# Patient Record
Sex: MALE | Race: WHITE | Employment: FULL TIME | URBAN - METROPOLITAN AREA
[De-identification: names, ages, dates, MRNs, and addresses within clinical notes are randomized per-mention and may not be internally consistent; named-entity substitution may affect disease eponyms.]

---

## 2017-01-10 DIAGNOSIS — N40.0 BPH (BENIGN PROSTATIC HYPERPLASIA): ICD-10-CM

## 2017-01-11 DIAGNOSIS — N52.01 ERECTILE DYSFUNCTION DUE TO ARTERIAL INSUFFICIENCY: ICD-10-CM

## 2017-01-11 DIAGNOSIS — N40.0 BPH (BENIGN PROSTATIC HYPERPLASIA): ICD-10-CM

## 2017-01-11 RX ORDER — TADALAFIL 5 MG/1
5 TABLET ORAL AS NEEDED
Qty: 30 TAB | Refills: 5 | Status: SHIPPED | OUTPATIENT
Start: 2017-01-11 | End: 2018-02-08 | Stop reason: SDUPTHER

## 2017-01-11 RX ORDER — TAMSULOSIN HYDROCHLORIDE 0.4 MG/1
0.4 CAPSULE ORAL DAILY
Qty: 30 CAP | Refills: 5 | Status: SHIPPED | OUTPATIENT
Start: 2017-01-11 | End: 2018-02-13 | Stop reason: SDUPTHER

## 2017-01-11 RX ORDER — TAMSULOSIN HYDROCHLORIDE 0.4 MG/1
CAPSULE ORAL
Qty: 30 CAP | Refills: 2 | Status: SHIPPED | OUTPATIENT
Start: 2017-01-11 | End: 2017-01-11 | Stop reason: SDUPTHER

## 2017-01-11 NOTE — TELEPHONE ENCOUNTER
From: Yifan Calero III  To: Thuy Anne DO  Sent: 1/11/2017 2:20 PM EST  Subject: Medication Renewal Request    Original authorizing provider: DO Yifan Carranza III would like a refill of the following medications:  tadalafil (CIALIS) 5 mg tablet [Venkatesh Khan DO]  tamsulosin (FLOMAX) 0.4 mg capsule Thuy Anne DO]    Preferred pharmacy: [place] [id number], [address]:

## 2017-01-17 ENCOUNTER — DOCUMENTATION ONLY (OUTPATIENT)
Dept: FAMILY MEDICINE CLINIC | Age: 62
End: 2017-01-17

## 2017-01-17 NOTE — PROGRESS NOTES
Pt is needing a pa for his rx cialis please. He brought in his new insurance card and it is in now. Any questions call him at [phone]. Thanks.

## 2017-01-18 NOTE — PROGRESS NOTES
[facility] form for Cialis completed and placed on Dr. Nikunj Vásquez desk for signature then fax.

## 2017-01-20 ENCOUNTER — TELEPHONE (OUTPATIENT)
Dept: FAMILY MEDICINE CLINIC | Age: 62
End: 2017-01-20

## 2017-02-08 ENCOUNTER — DOCUMENTATION ONLY (OUTPATIENT)
Dept: FAMILY MEDICINE CLINIC | Age: 62
End: 2017-02-08

## 2017-02-08 NOTE — PROGRESS NOTES
PA for Cialis approved from 1/20/17 thru 1/20/18, copy faxed to pharmacy & placed in scan folder to be scanned to chart.

## 2017-07-28 ENCOUNTER — OFFICE VISIT (OUTPATIENT)
Dept: FAMILY MEDICINE CLINIC | Age: 62
End: 2017-07-28

## 2017-07-28 VITALS
BODY MASS INDEX: 28.92 KG/M2 | WEIGHT: 202 LBS | OXYGEN SATURATION: 98 % | SYSTOLIC BLOOD PRESSURE: 137 MMHG | RESPIRATION RATE: 18 BRPM | TEMPERATURE: 98.2 F | HEIGHT: 70 IN | HEART RATE: 84 BPM | DIASTOLIC BLOOD PRESSURE: 85 MMHG

## 2017-07-28 DIAGNOSIS — H66.91 RIGHT ACUTE OTITIS MEDIA: Primary | ICD-10-CM

## 2017-07-28 DIAGNOSIS — H81.10 BPV (BENIGN POSITIONAL VERTIGO), UNSPECIFIED LATERALITY: ICD-10-CM

## 2017-07-28 DIAGNOSIS — F41.8 DEPRESSION WITH ANXIETY: ICD-10-CM

## 2017-07-28 RX ORDER — CEFDINIR 300 MG/1
300 CAPSULE ORAL 2 TIMES DAILY
Qty: 20 CAP | Refills: 0 | Status: SHIPPED | OUTPATIENT
Start: 2017-07-28 | End: 2017-08-07

## 2017-07-28 RX ORDER — ALPRAZOLAM 0.5 MG/1
TABLET ORAL
Qty: 30 TAB | Refills: 1 | Status: SHIPPED | OUTPATIENT
Start: 2017-07-28 | End: 2017-07-28 | Stop reason: SDUPTHER

## 2017-07-28 RX ORDER — MECLIZINE HYDROCHLORIDE 25 MG/1
25 TABLET ORAL
Qty: 30 TAB | Refills: 0 | Status: SHIPPED | OUTPATIENT
Start: 2017-07-28 | End: 2017-08-07

## 2017-07-28 RX ORDER — ALPRAZOLAM 0.5 MG/1
TABLET ORAL
Qty: 30 TAB | Refills: 2 | OUTPATIENT
Start: 2017-07-28 | End: 2018-02-08

## 2017-07-28 NOTE — PATIENT INSTRUCTIONS
Cawthorne Exercises for Vertigo: Care Instructions  Your Care Instructions  Simple exercises can help you regain your balance when you have vertigo. If you have Ménière's disease, benign paroxysmal positional vertigo (BPPV), or another inner ear problem, you may have vertigo off and on. Do these exercises first thing in the morning and before you go to bed. You might get dizzy when you first start them. If this happens, try to do them for at least 5 minutes. Do a group of exercises at a time, starting at the top of the list. It may take several weeks before you can do all the exercises without feeling dizzy. Follow-up care is a key part of your treatment and safety. Be sure to make and go to all appointments, and call your doctor if you are having problems. It's also a good idea to know your test results and keep a list of the medicines you take. How can you care for yourself at home? Exercise 1  While sitting on the side of the bed and holding your head still:  · Look up as far as you can. · Look down as far as you can. · Look from side to side as far as you can. · Stretch your arm straight out in front of you. Focus on your index finger. Continue to focus on your finger while you bring it to your nose. Exercise 2  While sitting on the side of the bed:  · Bring your head as far back as you can. · Bring your head forward to touch your chin to your chest.  · Turn your head from side to side. · Do these exercises first with your eyes open. Then try with your eyes closed. Exercise 3  While sitting on the side of the bed:  · Shrug your shoulders straight upward, then relax them. · Bend over and try to touch the ground with your fingers. Then go back to a sitting position. · Toss a small ball from one hand to the other. Throw the ball higher than your eyes so you have to look up.   Exercise 4  While standing (with someone close by if you feel uncomfortable):  · Repeat Exercise 1.  · Repeat Exercise 2.  · Pass a ball between your legs and above your head. · Sit down and then stand up. Repeat. Turn around in a [place] a different way each time you stand. · With someone close by to help you, try the above exercises with your eyes closed. Exercise 5  In a room that is cleared of obstacles:  · Walk to a corner of the room, turn to your right, and walk back to the starting point. Now, repeat and turn left. · Walk up and down a slope. Now try stairs. · While holding on to someone's arm, try these exercises with your eyes closed. When should you call for help? Watch closely for changes in your health, and be sure to contact your doctor if:  · You do not get better as expected. Where can you learn more? Go to http://james-dia.info/. Enter C351 in the search box to learn more about \"Cawthorne Exercises for Vertigo: Care Instructions. \"  Current as of: October 19, 2016  Content Version: 11.3  © 6834-9451 [employer]. Care instructions adapted under license by [employer] (which disclaims liability or warranty for this information). If you have questions about a medical condition or this instruction, always ask your healthcare professional. [place] [address] any warranty or liability for your use of this information.

## 2017-07-28 NOTE — PROGRESS NOTES
Chief Complaint   Patient presents with    Dizziness     Patient states dizziness began this morning; states dizziness is noted with any movement that is performed. Dizziness subsides while sitting still. 1. Have you been to the ER, urgent care clinic since your last visit? Hospitalized since your last visit? No    2. Have you seen or consulted any other health care providers outside of the [address] [place] [address] since your last visit? Include any pap smears or colon screening. No    Has previously had a history of this when he had a problem with his ear. Woke up and shortly after pt developed the sx. Had some congestion this morning and rhinorrhea last night after having a few small beers in a flight. Denies any heart palpitations. Denies any weakness on one side or slurred speech. Dizziness is worse with position changes. Feels like the room is spinning. Denies any other concerns at this time. Chief Complaint   Patient presents with    Dizziness     he is a 59y.o. year old male who presents for evalution. Reviewed PmHx, RxHx, FmHx, SocHx, AllgHx and updated and dated in the chart.     Review of Systems - negative except as listed above in the HPI    Objective:     Vitals:    07/28/17 1447   BP: 137/85   Pulse: 84   Resp: 18   Temp: 98.2 °F (36.8 °C)   TempSrc: Oral   SpO2: 98%   Weight: 202 lb (91.6 kg)   Height: 5' 10\" (1.778 m)     Physical Examination: General appearance - alert, well appearing, and in no distress  Mental status - normal mood, behavior, speech, dress, motor activity, and thought processes  Eyes - pupils equal and reactive, extraocular eye movements intact  Ears - left ear normal, right TM red, dull, bulging  Nose - mucosal congestion, mucosal pallor, clear rhinorrhea and sinuses normal and nontender  Mouth - mucous membranes moist, pharynx normal without lesions  Neck - supple, no significant adenopathy, carotids upstroke normal bilaterally, no bruits  Chest - clear to auscultation, no wheezes, rales or rhonchi, symmetric air entry  Heart - normal rate, regular rhythm, normal S1, S2, no murmurs    Assessment/ Plan:   Diagnoses and all orders for this visit:    1. Right acute otitis media  -     cefdinir (OMNICEF) 300 mg capsule; Take 1 Cap by mouth two (2) times a day for 10 days. Start and complete full course of omnicef. Dwp ADRs/SEs of medication. Push fluids. Rest. Saline nasal spray for nasal congestion. OTC motrin/apap for fevers. RTC if sx persist or worsen. 2. BPV (benign positional vertigo), unspecified laterality  -     meclizine (ANTIVERT) 25 mg tablet; Take 1 Tab by mouth three (3) times daily as needed for up to 10 days. Take as needed for dizziness. Reviewed SEs/ADRs of medication. Recommended trial of Cawthorne exercises and other supportive measures. Reviewed acute / worsening s/sx that warrant more immediate medical attention and pt verbalized understanding of this. Follow up if sx persist or worsen. 3. Depression with anxiety  -     ALPRAZolam (XANAX) 0.5 mg tablet; take 1 tablet by mouth up to twice a day as needed for anxiety. Must last 30 days. Refilled rx. Continue using sparingly as needed for acute anxiety sx only. Follow-up Disposition:  Return if symptoms worsen or fail to improve. I have discussed the diagnosis with the patient and the intended plan as seen in the above orders. The patient has received an after-visit summary and questions were answered concerning future plans.      Medication Side Effects and Warnings were discussed with patient: yes  Patient Labs were reviewed and or requested: yes  Patient Past Records were reviewed and or requested  yes  Patient / Caregiver Understanding of treatment plan was verbalized during office visit VALERIA Diaz    Patient Instructions        Cawthorne Exercises for Vertigo: Care Instructions  Your Care Instructions  Simple exercises can help you regain your balance when you have vertigo. If you have Ménière's disease, benign paroxysmal positional vertigo (BPPV), or another inner ear problem, you may have vertigo off and on. Do these exercises first thing in the morning and before you go to bed. You might get dizzy when you first start them. If this happens, try to do them for at least 5 minutes. Do a group of exercises at a time, starting at the top of the list. It may take several weeks before you can do all the exercises without feeling dizzy. Follow-up care is a key part of your treatment and safety. Be sure to make and go to all appointments, and call your doctor if you are having problems. It's also a good idea to know your test results and keep a list of the medicines you take. How can you care for yourself at home? Exercise 1  While sitting on the side of the bed and holding your head still:  · Look up as far as you can. · Look down as far as you can. · Look from side to side as far as you can. · Stretch your arm straight out in front of you. Focus on your index finger. Continue to focus on your finger while you bring it to your nose. Exercise 2  While sitting on the side of the bed:  · Bring your head as far back as you can. · Bring your head forward to touch your chin to your chest.  · Turn your head from side to side. · Do these exercises first with your eyes open. Then try with your eyes closed. Exercise 3  While sitting on the side of the bed:  · Shrug your shoulders straight upward, then relax them. · Bend over and try to touch the ground with your fingers. Then go back to a sitting position. · Toss a small ball from one hand to the other. Throw the ball higher than your eyes so you have to look up. Exercise 4  While standing (with someone close by if you feel uncomfortable):  · Repeat Exercise 1.  · Repeat Exercise 2.  · Pass a ball between your legs and above your head. · Sit down and then stand up. Repeat.  Turn around in a [place] a different way each time you stand. · With someone close by to help you, try the above exercises with your eyes closed. Exercise 5  In a room that is cleared of obstacles:  · Walk to a corner of the room, turn to your right, and walk back to the starting point. Now, repeat and turn left. · Walk up and down a slope. Now try stairs. · While holding on to someone's arm, try these exercises with your eyes closed. When should you call for help? Watch closely for changes in your health, and be sure to contact your doctor if:  · You do not get better as expected. Where can you learn more? Go to http://james-dia.info/. Enter C516 in the search box to learn more about \"Cawthorne Exercises for Vertigo: Care Instructions. \"  Current as of: October 19, 2016  Content Version: 11.3  © 8423-8783 [employer]. Care instructions adapted under license by [employer] (which disclaims liability or warranty for this information). If you have questions about a medical condition or this instruction, always ask your healthcare professional. [address] any warranty or liability for your use of this information.

## 2017-07-28 NOTE — PROGRESS NOTES
Chief Complaint   Patient presents with    Dizziness     Patient states dizziness began this morning; states dizziness is noted with any movement that is performed. Dizziness subside while sitting still. 1. Have you been to the ER, urgent care clinic since your last visit? Hospitalized since your last visit? No    2. Have you seen or consulted any other health care providers outside of the [address] [place] [address] since your last visit? Include any pap smears or colon screening.  No

## 2017-07-28 NOTE — MR AVS SNAPSHOT
Visit Information Date & Time Provider Department Dept. Phone Encounter #  
 7/28/2017  2:30 PM Yoko King  [facility] [phone] [id number] Follow-up Instructions Return if symptoms worsen or fail to improve. Upcoming Health Maintenance Date Due Hepatitis C Screening 1955 DTaP/Tdap/Td series (1 - Tdap) 11/10/1974 ZOSTER VACCINE AGE 60> 9/10/2013 INFLUENZA AGE 9 TO ADULT 8/1/2017 COLONOSCOPY 2/24/2018 Allergies as of 7/28/2017  Review Complete On: 7/28/2017 By: Yoko King NP No Known Allergies Current Immunizations  Never Reviewed No immunizations on file. Not reviewed this visit You Were Diagnosed With   
  
 Codes Comments Right acute otitis media    -  Primary ICD-10-CM: H66.91 
ICD-9-CM: 382.9 BPV (benign positional vertigo), unspecified laterality     ICD-10-CM: H81.10 ICD-9-CM: 386.11 Depression with anxiety     ICD-10-CM: F41.8 ICD-9-CM: 300.4 Vitals BP Pulse Temp Resp Height(growth percentile) Weight(growth percentile) 137/85 (BP 1 Location: Right arm, BP Patient Position: Sitting) 84 98.2 °F (36.8 °C) (Oral) 18 5' 10\" (1.778 m) 202 lb (91.6 kg) SpO2 BMI Smoking Status 98% 28.98 kg/m2 Former Smoker Vitals History BMI and BSA Data Body Mass Index Body Surface Area  
 28.98 kg/m 2 2.13 m 2 Preferred Pharmacy Pharmacy Name Phone [facility] [address] [phone] Your Updated Medication List  
  
   
This list is accurate as of: 7/28/17  3:04 PM.  Always use your most recent med list.  
  
  
  
  
 ALPRAZolam 0.5 mg tablet Commonly known as:  XANAX  
take 1 tablet by mouth up to twice a day as needed for anxiety. Must last 30 days. cefdinir 300 mg capsule Commonly known as:  OMNICEF Take 1 Cap by mouth two (2) times a day for 10 days. fluticasone 50 mcg/actuation nasal spray Commonly known as:  [place] 2 Sprays by Both Nostrils route daily. meclizine 25 mg tablet Commonly known as:  ANTIVERT Take 1 Tab by mouth three (3) times daily as needed for up to 10 days. tadalafil 5 mg tablet Commonly known as:  CIALIS Take 1 Tab by mouth as needed.  * tamsulosin 0.4 mg capsule Commonly known as:  FLOMAX  
take 1 capsule by mouth once daily * tamsulosin 0.4 mg capsule Commonly known as:  FLOMAX Take 1 Cap by mouth daily. * Notice: This list has 2 medication(s) that are the same as other medications prescribed for you. Read the directions carefully, and ask your doctor or other care provider to review them with you. Prescriptions Printed Refills ALPRAZolam (XANAX) 0.5 mg tablet 1 Sig: take 1 tablet by mouth up to twice a day as needed for anxiety. Must last 30 days. Class: Print Prescriptions Sent to Pharmacy Refills  
 cefdinir (OMNICEF) 300 mg capsule 0 Sig: Take 1 Cap by mouth two (2) times a day for 10 days. Class: Normal  
 Pharmacy: [facility] [place], [address] [facility] Ph #: [phone] Route: Oral  
 meclizine (ANTIVERT) 25 mg tablet 0 Sig: Take 1 Tab by mouth three (3) times daily as needed for up to 10 days. Class: Normal  
 Pharmacy: [facility] [place], [address] Ph #: [phone] Route: Oral  
  
Follow-up Instructions Return if symptoms worsen or fail to improve. Patient Instructions Cawthorne Exercises for Vertigo: Care Instructions Your Care Instructions Simple exercises can help you regain your balance when you have vertigo. If you have Ménière's disease, benign paroxysmal positional vertigo (BPPV), or another inner ear problem, you may have vertigo off and on. Do these exercises first thing in the morning and before you go to bed. You might get dizzy when you first start them. If this happens, try to do them for at least 5 minutes. Do a group of exercises at a time, starting at the top of the list. It may take several weeks before you can do all the exercises without feeling dizzy. Follow-up care is a key part of your treatment and safety. Be sure to make and go to all appointments, and call your doctor if you are having problems. It's also a good idea to know your test results and keep a list of the medicines you take. How can you care for yourself at home? Exercise 1 While sitting on the side of the bed and holding your head still: 
· Look up as far as you can. · Look down as far as you can. · Look from side to side as far as you can. · Stretch your arm straight out in front of you. Focus on your index finger. Continue to focus on your finger while you bring it to your nose. Exercise 2 While sitting on the side of the bed: · Bring your head as far back as you can. · Bring your head forward to touch your chin to your chest. 
· Turn your head from side to side. · Do these exercises first with your eyes open. Then try with your eyes closed. Exercise 3 While sitting on the side of the bed: · Shrug your shoulders straight upward, then relax them. · Bend over and try to touch the ground with your fingers. Then go back to a sitting position. · Toss a small ball from one hand to the other. Throw the ball higher than your eyes so you have to look up. Exercise 4 While standing (with someone close by if you feel uncomfortable): 
· Repeat Exercise 1. 
· Repeat Exercise 2. 
· Pass a ball between your legs and above your head. · Sit down and then stand up. Repeat. Turn around in a [place] a different way each time you stand. · With someone close by to help you, try the above exercises with your eyes closed. Exercise 5 In a room that is cleared of obstacles: 
· Walk to a corner of the room, turn to your right, and walk back to the starting point. Now, repeat and turn left. · Walk up and down a slope. Now try stairs. · While holding on to someone's arm, try these exercises with your eyes closed. When should you call for help? Watch closely for changes in your health, and be sure to contact your doctor if: 
· You do not get better as expected. Where can you learn more? Go to http://james-dia.info/. Enter C781 in the search box to learn more about \"Cawthorne Exercises for Vertigo: Care Instructions. \" Current as of: October 19, 2016 Content Version: 11.3 © 8050-7526 [employer]. Care instructions adapted under license by [employer] (which disclaims liability or warranty for this information). If you have questions about a medical condition or this instruction, always ask your healthcare professional. [address] any warranty or liability for your use of this information. Introducing [facility]! Dear Carline Rosen: 
Thank you for requesting a NearWoo account. Our records indicate that you already have an active NearWoo account. You can access your account anytime at [email or web address] Did you know that you can access your hospital and ER discharge instructions at any time in NearWoo? You can also review all of your test results from your hospital stay or ER visit. Additional Information If you have questions, please visit the Frequently Asked Questions section of the NearWoo website at [email or web address] Remember, NearWoo is NOT to be used for urgent needs. For medical emergencies, dial 911. Now available from your iPhone and Android! Please provide this summary of care documentation to your next provider. Your primary care clinician is listed as BRYNN MADSEN. If you have any questions after today's visit, please call [phone].

## 2017-08-09 ENCOUNTER — OFFICE VISIT (OUTPATIENT)
Dept: FAMILY MEDICINE CLINIC | Age: 62
End: 2017-08-09

## 2017-08-09 VITALS
DIASTOLIC BLOOD PRESSURE: 76 MMHG | TEMPERATURE: 98 F | RESPIRATION RATE: 18 BRPM | WEIGHT: 205 LBS | SYSTOLIC BLOOD PRESSURE: 125 MMHG | BODY MASS INDEX: 29.35 KG/M2 | OXYGEN SATURATION: 96 % | HEIGHT: 70 IN | HEART RATE: 92 BPM

## 2017-08-09 DIAGNOSIS — R42 VERTIGO: Primary | ICD-10-CM

## 2017-08-09 RX ORDER — CETIRIZINE HCL 10 MG
10 TABLET ORAL DAILY
Qty: 30 TAB | Refills: 5 | Status: SHIPPED | OUTPATIENT
Start: 2017-08-09 | End: 2018-11-25

## 2017-08-09 NOTE — PROGRESS NOTES
Chief Complaint   Patient presents with    Dizziness     vertigo     Pt presents to the office for dizziness - vertigo    1. Have you been to the ER, urgent care clinic since your last visit? Hospitalized since your last visit? No    2. Have you seen or consulted any other health care providers outside of the [address] [place] [address] since your last visit? Include any pap smears or colon screening. No      Pos changes make him dizzy for 2 weeks    Took antibx and takes meclizine prn due to making too sleepy        Chief Complaint   Patient presents with    Dizziness     vertigo     he is a 59y.o. year old male who presents for evalution. Reviewed PmHx, RxHx, FmHx, SocHx, AllgHx and updated and dated in the chart. Patient Active Problem List    Diagnosis    Memory loss    Lumbar radiculitis     Pt sees advanced ortho and receives epidural steroid injections.  Hypogonadism male    Hyperlipemia    Pre-diabetes    Sleep apnea    RAMIREZ (obstructive sleep apnea)     cpap      ED (erectile dysfunction)    Hypogonadism    Seasonal allergic reaction    Depression with anxiety       Review of Systems - negative except as listed above in the HPI    Objective:     Vitals:    08/09/17 0749   BP: 125/76   Pulse: 92   Resp: 18   Temp: 98 °F (36.7 °C)   TempSrc: Oral   SpO2: 96%   Weight: 205 lb (93 kg)   Height: 5' 10\" (1.778 m)     Physical Examination: General appearance - alert, well appearing, and in no distress  Ears - bilat tms with fluid, old scarring  Chest - clear to auscultation, no wheezes, rales or rhonchi, symmetric air entry  Heart - normal rate, regular rhythm, normal S1, S2, no murmurs, rubs, clicks or gallops    Assessment/ Plan:   Diagnoses and all orders for this visit:    1. Vertigo  -     cetirizine (ZYRTEC) 10 mg tablet; Take 1 Tab by mouth daily.  -take rx daily       Follow-up Disposition:  Return if symptoms worsen or fail to improve.     I have discussed the diagnosis with the patient and the intended plan as seen in the above orders. The patient understands and agrees with the plan. The patient has received an after-visit summary and questions were answered concerning future plans. Medication Side Effects and Warnings were discussed with patient  Patient Labs were reviewed and or requested:  Patient Past Records were reviewed and or requested    Xiang De La Cruz M.D. There are no Patient Instructions on file for this visit.

## 2017-08-09 NOTE — MR AVS SNAPSHOT
Visit Information Date & Time Provider Department Dept. Phone Encounter #  
 8/9/2017  7:45 AM Mary Beth Goddard MD [address] [phone] [id number] Follow-up Instructions Return if symptoms worsen or fail to improve. Upcoming Health Maintenance Date Due Hepatitis C Screening 1955 DTaP/Tdap/Td series (1 - Tdap) 11/10/1974 ZOSTER VACCINE AGE 60> 9/10/2013 INFLUENZA AGE 9 TO ADULT 8/1/2017 COLONOSCOPY 2/24/2018 Allergies as of 8/9/2017  Review Complete On: 8/9/2017 By: Mary Beth Goddard MD  
 No Known Allergies Current Immunizations  Never Reviewed No immunizations on file. Not reviewed this visit You Were Diagnosed With   
  
 Codes Comments Vertigo    -  Primary ICD-10-CM: P31 ICD-9-CM: 780.4 Vitals BP Pulse Temp Resp Height(growth percentile) Weight(growth percentile) 125/76 92 98 °F (36.7 °C) (Oral) 18 5' 10\" (1.778 m) 205 lb (93 kg) SpO2 BMI Smoking Status 96% 29.41 kg/m2 Former Smoker BMI and BSA Data Body Mass Index Body Surface Area  
 29.41 kg/m 2 2.14 m 2 Preferred Pharmacy Pharmacy Name Phone [id number] [address] [phone] Your Updated Medication List  
  
   
This list is accurate as of: 8/9/17  7:59 AM.  Always use your most recent med list.  
  
  
  
  
 ALPRAZolam 0.5 mg tablet Commonly known as:  XANAX  
take 1 tablet by mouth up to twice a day as needed for anxiety. Must last 30 days. cetirizine 10 mg tablet Commonly known as:  ZYRTEC Take 1 Tab by mouth daily. fluticasone 50 mcg/actuation nasal spray Commonly known as:  Aaliyah Sale 2 Sprays by Both Nostrils route daily. tadalafil 5 mg tablet Commonly known as:  CIALIS Take 1 Tab by mouth as needed.  * tamsulosin 0.4 mg capsule Commonly known as:  FLOMAX  
take 1 capsule by mouth once daily * tamsulosin 0.4 mg capsule Commonly known as:  FLOMAX Take 1 Cap by mouth daily. * Notice: This list has 2 medication(s) that are the same as other medications prescribed for you. Read the directions carefully, and ask your doctor or other care provider to review them with you. Prescriptions Sent to Pharmacy Refills  
 cetirizine (ZYRTEC) 10 mg tablet 5 Sig: Take 1 Tab by mouth daily. Class: Normal  
 Pharmacy: [address], [address] [facility] Ph #: [phone] Route: Oral  
  
Follow-up Instructions Return if symptoms worsen or fail to improve. Introducing [facility]! Dear Aimee Viera: 
Thank you for requesting a [employer] account. Our records indicate that you already have an active [employer] account. You can access your account anytime at [email or web address] [email or web address]/[employer] Did you know that you can access your hospital and ER discharge instructions at any time in [employer]? You can also review all of your test results from your hospital stay or ER visit. Additional Information If you have questions, please visit the Frequently Asked Questions section of the [employer] website at [email or web address] Remember, [employer] is NOT to be used for urgent needs. For medical emergencies, dial 911. Now available from your iPhone and Android! Please provide this summary of care documentation to your next provider. Your primary care clinician is listed as BRYNN MADSEN. If you have any questions after today's visit, please call [phone].

## 2017-08-18 ENCOUNTER — OFFICE VISIT (OUTPATIENT)
Dept: FAMILY MEDICINE CLINIC | Age: 62
End: 2017-08-18

## 2017-08-18 VITALS
DIASTOLIC BLOOD PRESSURE: 82 MMHG | WEIGHT: 203.8 LBS | TEMPERATURE: 98.3 F | HEART RATE: 77 BPM | OXYGEN SATURATION: 98 % | SYSTOLIC BLOOD PRESSURE: 112 MMHG | RESPIRATION RATE: 18 BRPM | BODY MASS INDEX: 29.18 KG/M2 | HEIGHT: 70 IN

## 2017-08-18 DIAGNOSIS — H81.10 BPPV (BENIGN PAROXYSMAL POSITIONAL VERTIGO), UNSPECIFIED LATERALITY: Primary | ICD-10-CM

## 2017-08-18 RX ORDER — MECLIZINE HCL 12.5 MG 12.5 MG/1
12.5 TABLET ORAL
Qty: 30 TAB | Refills: 2 | Status: SHIPPED | OUTPATIENT
Start: 2017-08-18 | End: 2018-11-25

## 2017-08-18 NOTE — MR AVS SNAPSHOT
Visit Information Date & Time Provider Department Dept. Phone Encounter #  
 8/18/2017 10:15 AM [facility], [address] [phone] [id number] Follow-up Instructions Return if symptoms worsen or fail to improve. Upcoming Health Maintenance Date Due Hepatitis C Screening 1955 DTaP/Tdap/Td series (1 - Tdap) 11/10/1974 ZOSTER VACCINE AGE 60> 9/10/2013 INFLUENZA AGE 9 TO ADULT 8/1/2017 COLONOSCOPY 2/24/2018 Allergies as of 8/18/2017  Review Complete On: 8/18/2017 By: [id number] [facility], DO No Known Allergies Current Immunizations  Never Reviewed No immunizations on file. Not reviewed this visit You Were Diagnosed With   
  
 Codes Comments BPPV (benign paroxysmal positional vertigo), unspecified laterality    -  Primary ICD-10-CM: H81.10 ICD-9-CM: 386.11 Vitals BP Pulse Temp Resp Height(growth percentile) Weight(growth percentile) 112/82 77 98.3 °F (36.8 °C) (Oral) 18 5' 10\" (1.778 m) 203 lb 12.8 oz (92.4 kg) SpO2 BMI Smoking Status 98% 29.24 kg/m2 Former Smoker Vitals History BMI and BSA Data Body Mass Index Body Surface Area  
 29.24 kg/m 2 2.14 m 2 Preferred Pharmacy Pharmacy Name Phone [facility] [address], [address] [phone] Your Updated Medication List  
  
   
This list is accurate as of: 8/18/17 11:04 AM.  Always use your most recent med list.  
  
  
  
  
 ALPRAZolam 0.5 mg tablet Commonly known as:  XANAX  
take 1 tablet by mouth up to twice a day as needed for anxiety. Must last 30 days. cetirizine 10 mg tablet Commonly known as:  ZYRTEC Take 1 Tab by mouth daily. fluticasone 50 mcg/actuation nasal spray Commonly known as:  Jaskaran Calk 2 Sprays by Both Nostrils route daily. meclizine 12.5 mg tablet Commonly known as:  ANTIVERT  
 Take 1 Tab by mouth three (3) times daily as needed. For vertigo  
  
 tadalafil 5 mg tablet Commonly known as:  CIALIS Take 1 Tab by mouth as needed.  * tamsulosin 0.4 mg capsule Commonly known as:  FLOMAX  
take 1 capsule by mouth once daily * tamsulosin 0.4 mg capsule Commonly known as:  FLOMAX Take 1 Cap by mouth daily. * Notice: This list has 2 medication(s) that are the same as other medications prescribed for you. Read the directions carefully, and ask your doctor or other care provider to review them with you. Prescriptions Sent to Pharmacy Refills  
 meclizine (ANTIVERT) 12.5 mg tablet 2 Sig: Take 1 Tab by mouth three (3) times daily as needed. For vertigo Class: Normal  
 [facility] [address] [phone] Route: Oral  
  
Follow-up Instructions Return if symptoms worsen or fail to improve. Patient Instructions Benign Paroxysmal Positional Vertigo (BPPV): Care Instructions Your Care Instructions Benign paroxysmal positional vertigo, also called BPPV, is an inner ear problem. It causes a spinning or whirling sensation when you move your head. This sensation is called vertigo. The vertigo usually lasts for less than a minute. People often have vertigo spells for a few days or weeks. Then the vertigo goes away. But it may come back again. The vertigo may be mild, or it may be bad enough to cause unsteadiness, nausea, and vomiting. When you move, your inner ear sends messages to the brain. This helps you keep your balance. Vertigo can happen when debris builds up in the inner ear. The buildup can cause the inner ear to send the wrong message to the brain. Your doctor may move you in different positions to help your vertigo get better faster. This is called the Epley maneuver. Your doctor may also prescribe medicines or exercises to help with your symptoms. Follow-up care is a key part of your treatment and safety. Be sure to make and go to all appointments, and call your doctor if you are having problems. It's also a good idea to know your test results and keep a list of the medicines you take. How can you care for yourself at home? · If your doctor suggests that you do Morgan-Daroff exercises: ¨ Sit on the edge of a bed or sofa. Quickly lie down on the side that causes the worst vertigo. Lie on your side with your ear down. ¨ Stay in this position for at least 30 seconds or until the vertigo goes away. ¨ Sit up. If this causes vertigo, wait for it to stop. ¨ Repeat the procedure on the other side. ¨ Repeat this 10 times. Do these exercises 2 times a day until the vertigo is gone. When should you call for help? Call 911 anytime you think you may need emergency care. For example, call if: 
· You have symptoms of a stroke. These may include: 
¨ Sudden numbness, tingling, weakness, or loss of movement in your face, arm, or leg, especially on only one side of your body. ¨ Sudden vision changes. ¨ Sudden trouble speaking. ¨ Sudden confusion or trouble understanding simple statements. ¨ Sudden problems with walking or balance. ¨ A sudden, severe headache that is different from past headaches. Call your doctor now or seek immediate medical care if: 
· You have new or worse nausea and vomiting. · You have new symptoms such as hearing loss or roaring in your ears. Watch closely for changes in your health, and be sure to contact your doctor if: 
· You are not getting better as expected. · Your vertigo gets worse. Where can you learn more? Go to http://james-dia.info/. Enter  in the search box to learn more about \"Benign Paroxysmal Positional Vertigo (BPPV): Care Instructions. \" Current as of: October 19, 2016 Content Version: 11.3 © 9994-2608 [employer].  Care instructions adapted under license by [address] (which disclaims liability or warranty for this information). If you have questions about a medical condition or this instruction, always ask your healthcare professional. [address] any warranty or liability for your use of this information. Epley Maneuver for Vertigo: Exercises Your Care Instructions The Epley Maneuver is a series of movements your doctor may use to treat your vertigo. Here are the steps for the exercises. Your doctor or physical therapist will guide you through the movements. A single 10- to 15-minute session often is all that's needed. Crystal debris (canaliths) cause the vertigo. When your head is moved into different positions, the debris moves freely. This may cause your symptoms to stop. How to do the exercises Step 1 
 
· You will sit on the doctor's exam table. Your legs will be out in front of you. The doctor or physical therapist will turn your head so that it is long term between looking straight ahead and looking to the side that causes the worst vertigo. · Without changing your head position, he or she will guide you back quickly. Your shoulders will be on the table. Your head will hang over the edge of the table. At this point, the side of your head that is causing the worst vertigo will face the floor. You'll stay in this position for 30 seconds or until your symptoms stop. Step 2 · Then, the doctor or physical therapist will turn your head to the other side. You don't need to lift your head. The other side of your head will face the floor. You will stay in this position for 30 seconds or until your symptoms stop. Step 3 · The doctor or physical therapist will help you roll your body in the same direction that your head is facing. You will lie on your side. (For example, if you are looking to your right, you will roll onto your right side.) The side that causes the worst symptoms should be facing up.  Yesika Singh stay in this position for another 30 seconds or until your symptoms stop. Step 4 · The doctor or physical therapist will then help you to sit back up. Your legs will hang off the table on the same side that you were facing. Follow-up care is a key part of your treatment and safety. Be sure to make and go to all appointments, and call your doctor if you are having problems. It's also a good idea to know your test results and keep a list of the medicines you take. Where can you learn more? Go to http://james-dia.info/. Enter G053 in the search box to learn more about \"Epley Maneuver for Vertigo: Exercises. \" Current as of: July 29, 2016 Content Version: 11.3 © 5970-8582 [employer]minutes. Care instructions adapted under license by [employer] (which disclaims liability or warranty for this information). If you have questions about a medical condition or this instruction, always ask your healthcare professional. [place] [address] any warranty or liability for your use of this information. Introducing [facility]! Dear Jason Arm: 
Thank you for requesting a [employer] account. Our records indicate that you already have an active [employer] account. You can access your account anytime at [email or web address] Did you know that you can access your hospital and ER discharge instructions at any time in [employer]? You can also review all of your test results from your hospital stay or ER visit. Additional Information If you have questions, please visit the Frequently Asked Questions section of the [employer] website at [email or web address] Remember, [employer] is NOT to be used for urgent needs. For medical emergencies, dial 911. Now available from your iPhone and Android! Please provide this summary of care documentation to your next provider. Your primary care clinician is listed as BRYNN MADSEN. If you have any questions after today's visit, please call [phone].

## 2017-08-18 NOTE — PROGRESS NOTES
Fili Zee is a 59 y.o. male   Chief Complaint   Patient presents with    Dizziness    pt here for vertigo and states that he feels like the room is spinning around him. Pt has been seen 2x for this and told ear infection which was treated and told fluid on ears but states this is chronic and is taking an antihistamine. Pt states the vertigo is provoked by changes in position. he is a 59y.o. year old male who presents for evalution. Reviewed PmHx, RxHx, FmHx, SocHx, AllgHx and updated and dated in the chart. Review of Systems - negative except as listed above in the HPI    Objective:     Vitals:    08/18/17 1029   BP: 112/82   Pulse: 77   Resp: 18   Temp: 98.3 °F (36.8 °C)   TempSrc: Oral   SpO2: 98%   Weight: 203 lb 12.8 oz (92.4 kg)   Height: 5' 10\" (1.778 m)       Current Outpatient Prescriptions   Medication Sig    meclizine (ANTIVERT) 12.5 mg tablet Take 1 Tab by mouth three (3) times daily as needed. For vertigo    cetirizine (ZYRTEC) 10 mg tablet Take 1 Tab by mouth daily.  tamsulosin (FLOMAX) 0.4 mg capsule take 1 capsule by mouth once daily    ALPRAZolam (XANAX) 0.5 mg tablet take 1 tablet by mouth up to twice a day as needed for anxiety. Must last 30 days.  tadalafil (CIALIS) 5 mg tablet Take 1 Tab by mouth as needed.     tamsulosin (FLOMAX) 0.4 mg capsule Take 1 Cap by mouth daily.  fluticasone (FLONASE) 50 mcg/actuation nasal spray 2 Sprays by Both Nostrils route daily. No current facility-administered medications for this visit.         Physical Examination: General appearance - alert, well appearing, and in no distress  Eyes - pupils equal and reactive, extraocular eye movements intact  Ears - bilateral TM's and external ear canals normal  Mouth - mucous membranes moist, pharynx normal without lesions  Neck - supple, no significant adenopathy  Chest - clear to auscultation, no wheezes, rales or rhonchi, symmetric air entry  Heart - normal rate, regular rhythm, normal S1, S2, no murmurs, rubs, clicks or gallops  Neurological - pos shad hallpike      Assessment/ Plan:   Diagnoses and all orders for this visit:    1. BPPV (benign paroxysmal positional vertigo), unspecified laterality  -     meclizine (ANTIVERT) 12.5 mg tablet; Take 1 Tab by mouth three (3) times daily as needed. For vertigo     given epley maneuver exercises and to do these at home with gf to help if not helping will send to PT  Follow-up Disposition:  Return if symptoms worsen or fail to improve, for CPE with fasting labs. I have discussed the diagnosis with the patient and the intended plan as seen in the above orders. The patient has received an after-visit summary and questions were answered concerning future plans. Pt conveyed understanding of plan.     Medication Side Effects and Warnings were discussed with patient      Alfredo Mon, DO

## 2017-08-18 NOTE — PATIENT INSTRUCTIONS
Benign Paroxysmal Positional Vertigo (BPPV): Care Instructions  Your Care Instructions    Benign paroxysmal positional vertigo, also called BPPV, is an inner ear problem. It causes a spinning or whirling sensation when you move your head. This sensation is called vertigo. The vertigo usually lasts for less than a minute. People often have vertigo spells for a few days or weeks. Then the vertigo goes away. But it may come back again. The vertigo may be mild, or it may be bad enough to cause unsteadiness, nausea, and vomiting. When you move, your inner ear sends messages to the brain. This helps you keep your balance. Vertigo can happen when debris builds up in the inner ear. The buildup can cause the inner ear to send the wrong message to the brain. Your doctor may move you in different positions to help your vertigo get better faster. This is called the Epley maneuver. Your doctor may also prescribe medicines or exercises to help with your symptoms. Follow-up care is a key part of your treatment and safety. Be sure to make and go to all appointments, and call your doctor if you are having problems. It's also a good idea to know your test results and keep a list of the medicines you take. How can you care for yourself at home? · If your doctor suggests that you do Morgan-Daroff exercises:  ¨ Sit on the edge of a bed or sofa. Quickly lie down on the side that causes the worst vertigo. Lie on your side with your ear down. ¨ Stay in this position for at least 30 seconds or until the vertigo goes away. ¨ Sit up. If this causes vertigo, wait for it to stop. ¨ Repeat the procedure on the other side. ¨ Repeat this 10 times. Do these exercises 2 times a day until the vertigo is gone. When should you call for help? Call 911 anytime you think you may need emergency care. For example, call if:  · You have symptoms of a stroke.  These may include:  ¨ Sudden numbness, tingling, weakness, or loss of movement in your face, arm, or leg, especially on only one side of your body. ¨ Sudden vision changes. ¨ Sudden trouble speaking. ¨ Sudden confusion or trouble understanding simple statements. ¨ Sudden problems with walking or balance. ¨ A sudden, severe headache that is different from past headaches. Call your doctor now or seek immediate medical care if:  · You have new or worse nausea and vomiting. · You have new symptoms such as hearing loss or roaring in your ears. Watch closely for changes in your health, and be sure to contact your doctor if:  · You are not getting better as expected. · Your vertigo gets worse. Where can you learn more? Go to http://jamesKaymbudia.info/. Enter  in the search box to learn more about \"Benign Paroxysmal Positional Vertigo (BPPV): Care Instructions. \"  Current as of: October 19, 2016  Content Version: 11.3  © 5250-8507 [employer]. Care instructions adapted under license by [employer] (which disclaims liability or warranty for this information). If you have questions about a medical condition or this instruction, always ask your healthcare professional. [place] [address] any warranty or liability for your use of this information. Epley Maneuver for Vertigo: Exercises  Your Care Instructions  The Epley Maneuver is a series of movements your doctor may use to treat your vertigo. Here are the steps for the exercises. Your doctor or physical therapist will guide you through the movements. A single 10- to 15-minute session often is all that's needed. Crystal debris (canaliths) cause the vertigo. When your head is moved into different positions, the debris moves freely. This may cause your symptoms to stop. How to do the exercises  Step 1    · You will sit on the doctor's exam table. Your legs will be out in front of you.  The doctor or physical therapist will turn your head so that it is FCI between looking straight ahead and looking to the side that causes the worst vertigo. · Without changing your head position, he or she will guide you back quickly. Your shoulders will be on the table. Your head will hang over the edge of the table. At this point, the side of your head that is causing the worst vertigo will face the floor. You'll stay in this position for 30 seconds or until your symptoms stop. Step 2    · Then, the doctor or physical therapist will turn your head to the other side. You don't need to lift your head. The other side of your head will face the floor. You will stay in this position for 30 seconds or until your symptoms stop. Step 3    · The doctor or physical therapist will help you roll your body in the same direction that your head is facing. You will lie on your side. (For example, if you are looking to your right, you will roll onto your right side.) The side that causes the worst symptoms should be facing up. You'll stay in this position for another 30 seconds or until your symptoms stop. Step 4    · The doctor or physical therapist will then help you to sit back up. Your legs will hang off the table on the same side that you were facing. Follow-up care is a key part of your treatment and safety. Be sure to make and go to all appointments, and call your doctor if you are having problems. It's also a good idea to know your test results and keep a list of the medicines you take. Where can you learn more? Go to http://james-dia.info/. Enter C417 in the search box to learn more about \"Epley Maneuver for Vertigo: Exercises. \"  Current as of: July 29, 2016  Content Version: 11.3  © 7161-0338 Healthwise, Incorporated. Care instructions adapted under license by [employer] (which disclaims liability or warranty for this information).  If you have questions about a medical condition or this instruction, always ask your healthcare professional. Vinny Loving disclaims any warranty or liability for your use of this information.

## 2018-02-08 ENCOUNTER — OFFICE VISIT (OUTPATIENT)
Dept: FAMILY MEDICINE CLINIC | Age: 63
End: 2018-02-08

## 2018-02-08 VITALS
TEMPERATURE: 98.5 F | OXYGEN SATURATION: 97 % | SYSTOLIC BLOOD PRESSURE: 128 MMHG | DIASTOLIC BLOOD PRESSURE: 84 MMHG | RESPIRATION RATE: 18 BRPM | WEIGHT: 205 LBS | HEART RATE: 79 BPM | HEIGHT: 70 IN | BODY MASS INDEX: 29.35 KG/M2

## 2018-02-08 DIAGNOSIS — Z12.11 COLON CANCER SCREENING: ICD-10-CM

## 2018-02-08 DIAGNOSIS — Z11.59 SCREENING FOR VIRAL DISEASE: ICD-10-CM

## 2018-02-08 DIAGNOSIS — R42 VERTIGO: ICD-10-CM

## 2018-02-08 DIAGNOSIS — Z00.00 PHYSICAL EXAM: Primary | ICD-10-CM

## 2018-02-08 DIAGNOSIS — J30.89 CHRONIC NON-SEASONAL ALLERGIC RHINITIS, UNSPECIFIED TRIGGER: ICD-10-CM

## 2018-02-08 DIAGNOSIS — Z12.5 PROSTATE CANCER SCREENING: ICD-10-CM

## 2018-02-08 DIAGNOSIS — N52.01 ERECTILE DYSFUNCTION DUE TO ARTERIAL INSUFFICIENCY: ICD-10-CM

## 2018-02-08 RX ORDER — TADALAFIL 5 MG/1
TABLET, FILM COATED ORAL
Qty: 30 TAB | Refills: 3 | Status: SHIPPED | OUTPATIENT
Start: 2018-02-08 | End: 2018-09-25 | Stop reason: SDUPTHER

## 2018-02-08 RX ORDER — MONTELUKAST SODIUM 10 MG/1
10 TABLET ORAL DAILY
Qty: 30 TAB | Refills: 5 | Status: SHIPPED | OUTPATIENT
Start: 2018-02-08 | End: 2018-11-25

## 2018-02-08 RX ORDER — CLONAZEPAM 0.5 MG/1
0.5 TABLET ORAL DAILY
Qty: 30 TAB | Refills: 0 | Status: SHIPPED | OUTPATIENT
Start: 2018-02-08 | End: 2018-08-08

## 2018-02-08 NOTE — PROGRESS NOTES
Benson Chapa is a 57 y.o. male   Chief Complaint   Patient presents with    Complete Physical    pt here for CPE. He had seen ENT for his vertigo and was told a weakness in his inner L ear. Pt is not taking the antivert unless it is very bad because it makes him sleepy. Pt also reports that he can not smell but does have chronic sinus congestion. Pt has used nasacort in past but stopped discussed singulair and pt agreeable. Chief Complaint   Patient presents with    Complete Physical     he is a 57y.o. year old male who presents for evalution. Reviewed PmHx, RxHx, FmHx, SocHx, AllgHx and updated and dated in the chart.     Review of Systems - negative except as listed above in the HPI    Objective:     Vitals:    02/08/18 0753   BP: 128/84   Pulse: 79   Resp: 18   Temp: 98.5 °F (36.9 °C)   TempSrc: Oral   SpO2: 97%   Weight: 205 lb (93 kg)   Height: 5' 10\" (1.778 m)     Physical Examination: General appearance - alert, well appearing, and in no distress  Mental status - alert, oriented to person, place, and time  Eyes - pupils equal and reactive, extraocular eye movements intact  Ears - bilateral TM's and external ear canals normal  Nose - normal and patent, no erythema, discharge or polyps  Mouth - mucous membranes moist, pharynx normal without lesions  Neck - supple, no significant adenopathy  Lymphatics - no palpable lymphadenopathy, no hepatosplenomegaly  Chest - clear to auscultation, no wheezes, rales or rhonchi, symmetric air entry  Heart - normal rate, regular rhythm, normal S1, S2, no murmurs, rubs, clicks or gallops  Abdomen - soft, nontender, nondistended, no masses or organomegaly  Back exam - full range of motion, no tenderness, palpable spasm or pain on motion  Neurological - alert, oriented, normal speech, no focal findings or movement disorder noted  Musculoskeletal - no joint tenderness, deformity or swelling  Extremities - peripheral pulses normal, no pedal edema, no clubbing or cyanosis  Skin - normal coloration and turgor, no rashes, no suspicious skin lesions noted    Assessment/ Plan:   Diagnoses and all orders for this visit:    1. Physical exam  -     CBC WITH AUTOMATED DIFF  -     METABOLIC PANEL, COMPREHENSIVE  -     TSH 3RD GENERATION  -     LIPID PANEL  -     PSA W/ REFLX FREE PSA  -     URINALYSIS W/ RFLX MICROSCOPIC    2. Prostate cancer screening  -     PSA W/ REFLX FREE PSA    3. Chronic non-seasonal allergic rhinitis, unspecified trigger  -     montelukast (SINGULAIR) 10 mg tablet; Take 1 Tab by mouth daily. 4. Vertigo  -     clonazePAM (KLONOPIN) 0.5 mg tablet; Take 1 Tab by mouth daily. Max Daily Amount: 0.5 mg.    5. Screening for viral disease  -     HEPATITIS C AB    6. Colon cancer screening  -     [facility]     if klonopin working for vertigo will refil land advised will need to be seen Q3 months for this. Antivert is too sedating  -Patient is in good health  -Discussed with patient cancer risk factors and screens needed  -Patient needs a colonoscopy yes and will give referral since due later this month  -Labs from previous visits were discussed with patient yes  -Discussed with patient diet and exercise=yes  -Discussed with patient testicular (male)/breast self exam (female)= yes  Follow-up Disposition:  Return in about 3 months (around 5/8/2018), or if symptoms worsen or fail to improve. I have discussed the diagnosis with the patient and the intended plan as seen in the above orders. The patient has received an after-visit summary and questions were answered concerning future plans. Pt conveyed understanding.     Medication Side Effects and Warnings were discussed with patient: yes  Patient Labs were reviewed and or requested: yes  Patient Past Records were reviewed and or requested  yes    Patient Instructions   A Healthy Lifestyle: Care Instructions  Your Care Instructions    A healthy lifestyle can help you feel good, stay at a healthy weight, and have plenty of energy for both work and play. A healthy lifestyle is something you can share with your whole family. A healthy lifestyle also can lower your risk for serious health problems, such as high blood pressure, heart disease, and diabetes. You can follow a few steps listed below to improve your health and the health of your family. Follow-up care is a key part of your treatment and safety. Be sure to make and go to all appointments, and call your doctor if you are having problems. It's also a good idea to know your test results and keep a list of the medicines you take. How can you care for yourself at home? · Do not eat too much sugar, fat, or fast foods. You can still have dessert and treats now and then. The goal is moderation. · Start small to improve your eating habits. Pay attention to portion sizes, drink less juice and soda pop, and eat more fruits and vegetables. ¨ Eat a healthy amount of food. A 3-ounce serving of meat, for example, is about the size of a deck of cards. Fill the rest of your plate with vegetables and whole grains. ¨ Limit the amount of soda and sports drinks you have every day. Drink more water when you are thirsty. ¨ Eat at least 5 servings of fruits and vegetables every day. It may seem like a lot, but it is not hard to reach this goal. A serving or helping is 1 piece of fruit, 1 cup of vegetables, or 2 cups of leafy, raw vegetables. Have an apple or some carrot sticks as an afternoon snack instead of a candy bar. Try to have fruits and/or vegetables at every meal.  · Make exercise part of your daily routine. You may want to start with simple activities, such as walking, bicycling, or slow swimming. Try to be active 30 to 60 minutes every day. You do not need to do all 30 to 60 minutes all at once. For example, you can exercise 3 times a day for 10 or 20 minutes.  Moderate exercise is safe for most people, but it is always a good idea to talk to your doctor before starting an exercise program.  · Keep moving. Tayo Calender the lawn, work in the garden, or [employer]. Take the stairs instead of the elevator at work. · If you smoke, quit. People who smoke have an increased risk for heart attack, stroke, cancer, and other lung illnesses. Quitting is hard, but there are ways to boost your chance of quitting tobacco for good. ¨ Use nicotine gum, patches, or lozenges. ¨ Ask your doctor about stop-smoking programs and medicines. ¨ Keep trying. In addition to reducing your risk of diseases in the future, you will notice some benefits soon after you stop using tobacco. If you have shortness of breath or asthma symptoms, they will likely get better within a few weeks after you quit. · Limit how much alcohol you drink. Moderate amounts of alcohol (up to 2 drinks a day for men, 1 drink a day for women) are okay. But drinking too much can lead to liver problems, high blood pressure, and other health problems. Family health  If you have a family, there are many things you can do together to improve your health. · Eat meals together as a family as often as possible. · Eat healthy foods. This includes fruits, vegetables, lean meats and dairy, and whole grains. · Include your family in your fitness plan. Most people think of activities such as jogging or tennis as the way to fitness, but there are many ways you and your family can be more active. Anything that makes you breathe hard and gets your heart pumping is exercise. Here are some tips:  ¨ Walk to do errands or to take your child to school or the bus. ¨ Go for a family bike ride after dinner instead of watching TV. Where can you learn more? Go to http://james-dia.info/. Enter P950 in the search box to learn more about \"A Healthy Lifestyle: Care Instructions. \"  Current as of: May 12, 2017  Content Version: 11.4  © 3193-1990 Healthwise, Incorporated.  Care instructions adapted under license by [employer] (which disclaims liability or warranty for this information). If you have questions about a medical condition or this instruction, always ask your healthcare professional. [address] any warranty or liability for your use of this information.             Dr. Slime Carlin

## 2018-02-08 NOTE — PATIENT INSTRUCTIONS

## 2018-02-09 LAB
ALBUMIN SERPL-MCNC: 4.3 G/DL (ref 3.6–4.8)
ALBUMIN/GLOB SERPL: 1.7 {RATIO} (ref 1.2–2.2)
ALP SERPL-CCNC: 47 IU/L (ref 39–117)
ALT SERPL-CCNC: 17 IU/L (ref 0–44)
APPEARANCE UR: CLEAR
AST SERPL-CCNC: 19 IU/L (ref 0–40)
BASOPHILS # BLD AUTO: 0 X10E3/UL (ref 0–0.2)
BASOPHILS NFR BLD AUTO: 0 %
BILIRUB SERPL-MCNC: 0.6 MG/DL (ref 0–1.2)
BILIRUB UR QL STRIP: NEGATIVE
BUN SERPL-MCNC: 12 MG/DL (ref 8–27)
BUN/CREAT SERPL: 12 (ref 10–24)
CALCIUM SERPL-MCNC: 9.1 MG/DL (ref 8.6–10.2)
CHLORIDE SERPL-SCNC: 101 MMOL/L (ref 96–106)
CHOLEST SERPL-MCNC: 176 MG/DL (ref 100–199)
CO2 SERPL-SCNC: 21 MMOL/L (ref 18–29)
COLOR UR: YELLOW
CREAT SERPL-MCNC: 1.04 MG/DL (ref 0.76–1.27)
EOSINOPHIL # BLD AUTO: 0.1 X10E3/UL (ref 0–0.4)
EOSINOPHIL NFR BLD AUTO: 3 %
ERYTHROCYTE [DISTWIDTH] IN BLOOD BY AUTOMATED COUNT: 14.2 % (ref 12.3–15.4)
GFR SERPLBLD CREATININE-BSD FMLA CKD-EPI: 76 ML/MIN/1.73
GFR SERPLBLD CREATININE-BSD FMLA CKD-EPI: 87 ML/MIN/1.73
GLOBULIN SER CALC-MCNC: 2.5 G/DL (ref 1.5–4.5)
GLUCOSE SERPL-MCNC: 91 MG/DL (ref 65–99)
GLUCOSE UR QL: NEGATIVE
HCT VFR BLD AUTO: 43.5 % (ref 37.5–51)
HCV AB S/CO SERPL IA: <0.1 S/CO RATIO (ref 0–0.9)
HDLC SERPL-MCNC: 25 MG/DL
HGB BLD-MCNC: 14.3 G/DL (ref 13–17.7)
HGB UR QL STRIP: NEGATIVE
IMM GRANULOCYTES # BLD: 0 X10E3/UL (ref 0–0.1)
IMM GRANULOCYTES NFR BLD: 0 %
INTERPRETATION, 910389: NORMAL
KETONES UR QL STRIP: NEGATIVE
LDLC SERPL CALC-MCNC: 113 MG/DL (ref 0–99)
LEUKOCYTE ESTERASE UR QL STRIP: NEGATIVE
LYMPHOCYTES # BLD AUTO: 1.6 X10E3/UL (ref 0.7–3.1)
LYMPHOCYTES NFR BLD AUTO: 33 %
MCH RBC QN AUTO: 26.7 PG (ref 26.6–33)
MCHC RBC AUTO-ENTMCNC: 32.9 G/DL (ref 31.5–35.7)
MCV RBC AUTO: 81 FL (ref 79–97)
MICRO URNS: NORMAL
MONOCYTES # BLD AUTO: 0.5 X10E3/UL (ref 0.1–0.9)
MONOCYTES NFR BLD AUTO: 10 %
NEUTROPHILS # BLD AUTO: 2.6 X10E3/UL (ref 1.4–7)
NEUTROPHILS NFR BLD AUTO: 54 %
NITRITE UR QL STRIP: NEGATIVE
PH UR STRIP: 5 [PH] (ref 5–7.5)
PLATELET # BLD AUTO: 222 X10E3/UL (ref 150–379)
POTASSIUM SERPL-SCNC: 4.3 MMOL/L (ref 3.5–5.2)
PROT SERPL-MCNC: 6.8 G/DL (ref 6–8.5)
PROT UR QL STRIP: NEGATIVE
PSA SERPL-MCNC: 1.6 NG/ML (ref 0–4)
RBC # BLD AUTO: 5.36 X10E6/UL (ref 4.14–5.8)
REFLEX CRITERIA: NORMAL
SODIUM SERPL-SCNC: 141 MMOL/L (ref 134–144)
SP GR UR: 1.01 (ref 1–1.03)
TRIGL SERPL-MCNC: 192 MG/DL (ref 0–149)
TSH SERPL DL<=0.005 MIU/L-ACNC: 2.58 UIU/ML (ref 0.45–4.5)
UROBILINOGEN UR STRIP-MCNC: 0.2 MG/DL (ref 0.2–1)
VLDLC SERPL CALC-MCNC: 38 MG/DL (ref 5–40)
WBC # BLD AUTO: 4.9 X10E3/UL (ref 3.4–10.8)

## 2018-02-14 RX ORDER — TAMSULOSIN HYDROCHLORIDE 0.4 MG/1
0.4 CAPSULE ORAL DAILY
Qty: 30 CAP | Refills: 5 | Status: SHIPPED | OUTPATIENT
Start: 2018-02-14 | End: 2018-08-08 | Stop reason: SDUPTHER

## 2018-08-08 ENCOUNTER — OFFICE VISIT (OUTPATIENT)
Dept: FAMILY MEDICINE CLINIC | Age: 63
End: 2018-08-08

## 2018-08-08 VITALS
HEART RATE: 73 BPM | RESPIRATION RATE: 18 BRPM | BODY MASS INDEX: 28.77 KG/M2 | HEIGHT: 70 IN | OXYGEN SATURATION: 97 % | SYSTOLIC BLOOD PRESSURE: 87 MMHG | DIASTOLIC BLOOD PRESSURE: 56 MMHG | WEIGHT: 201 LBS | TEMPERATURE: 98.3 F

## 2018-08-08 DIAGNOSIS — F51.01 PRIMARY INSOMNIA: Primary | ICD-10-CM

## 2018-08-08 DIAGNOSIS — Z51.81 MEDICATION MONITORING ENCOUNTER: ICD-10-CM

## 2018-08-08 LAB
BARBITURATES UR POC: NEGATIVE
BENZODIAZEPINES UR POC: NEGATIVE
COCAINE QL URINE POC: NEGATIVE
LOT EXP DATE POC: NORMAL
LOT NUMBER POC: NORMAL
MARIJUANA (THC) QL URINE POC: NEGATIVE
MDMA/ECSTASY UR POC: NEGATIVE
METHADONE QL URINE POC: NEGATIVE
METHAMPHETAMINE QL URINE POC: NEGATIVE
NTI OTHER MICRO TRANSPORT 977598: NEGATIVE
OPIATES QL URINE POC: NEGATIVE
OXYCODONE UR POC: NEGATIVE
VALID INTERNAL CONTROL?: YES

## 2018-08-08 RX ORDER — TEMAZEPAM 15 MG/1
15 CAPSULE ORAL
Qty: 30 CAP | Refills: 1 | Status: SHIPPED | OUTPATIENT
Start: 2018-08-08 | End: 2018-11-25

## 2018-08-08 NOTE — PROGRESS NOTES
Ki Lala is a 57 y.o. male   Chief Complaint   Patient presents with    Medication Refill    pt with known episodic vertigo and was taking xanax once in a while for this and was switched to klonopin for better effect. Pt states he would like something for sleep and discussed using something on label such as Restoril for sleep and pt is agreeable to this./  Pt last fill of klonopin was February and states last klonopin was taken 1 month ago. Utox is neg for all controlled substances tested for. he is a 57y.o. year old male who presents for evalution. Reviewed PmHx, RxHx, FmHx, SocHx, AllgHx and updated and dated in the chart. Review of Systems - negative except as listed above in the HPI    Objective:     Vitals:    08/08/18 0733 08/08/18 0811   BP: (!) 80/52 (!) 87/56   Pulse: 73    Resp: 18    Temp: 98.3 °F (36.8 °C)    TempSrc: Oral    SpO2: 97%    Weight: 201 lb (91.2 kg)    Height: 5' 10\" (1.778 m)        Current Outpatient Prescriptions   Medication Sig    temazepam (RESTORIL) 15 mg capsule Take 1 Cap by mouth nightly as needed for Sleep. Max Daily Amount: 15 mg. D/c klonopin    montelukast (SINGULAIR) 10 mg tablet Take 1 Tab by mouth daily.  CIALIS 5 mg tablet TAKE ONE TABLET BY MOUTH DAILY AS NEEDED    tamsulosin (FLOMAX) 0.4 mg capsule take 1 capsule by mouth once daily    meclizine (ANTIVERT) 12.5 mg tablet Take 1 Tab by mouth three (3) times daily as needed. For vertigo    cetirizine (ZYRTEC) 10 mg tablet Take 1 Tab by mouth daily.  fluticasone (FLONASE) 50 mcg/actuation nasal spray 2 Sprays by Both Nostrils route daily. No current facility-administered medications for this visit.         Physical Examination: General appearance - alert, well appearing, and in no distress  Mental status - alert, oriented to person, place, and time  Chest - clear to auscultation, no wheezes, rales or rhonchi, symmetric air entry  Heart - normal rate, regular rhythm, normal S1, S2, no murmurs, rubs, clicks or gallops      Assessment/ Plan:   Diagnoses and all orders for this visit:    1. Primary insomnia  -     temazepam (RESTORIL) 15 mg capsule; Take 1 Cap by mouth nightly as needed for Sleep. Max Daily Amount: 15 mg. D/c klonopin    2. Medication monitoring encounter  -     [facility]OSH HERNANDEZ ICUP DX DRUG SCREEN 10       Follow-up Disposition:  Return if symptoms worsen or fail to improve. I have discussed the diagnosis with the patient and the intended plan as seen in the above orders. The patient has received an after-visit summary and questions were answered concerning future plans. Pt conveyed understanding of plan. Medication Side Effects and Warnings were discussed with patient      Rakesh Vigil DO       Discussed the patient's BMI with him. The BMI follow up plan is as follows:     dietary management education, guidance, and counseling  encourage exercise  monitor weight  prescribed dietary intake    An After Visit Summary was printed and given to the patient.

## 2018-08-08 NOTE — PROGRESS NOTES
Chief Complaint   Patient presents with    Medication Refill     1. Have you been to the ER, urgent care clinic since your last visit? Hospitalized since your last visit? No    2. Have you seen or consulted any other health care providers outside of the [address] [place] [address] since your last visit? Include any pap smears or colon screening.  No  Visit Vitals    BP (!) 80/52 (BP 1 Location: Left arm, BP Patient Position: Sitting)    Pulse 73    Temp 98.3 °F (36.8 °C) (Oral)    Resp 18    Ht 5' 10\" (1.778 m)    Wt 201 lb (91.2 kg)    SpO2 97%    BMI 28.84 kg/m2

## 2018-08-08 NOTE — PATIENT INSTRUCTIONS
Body Mass Index: Care Instructions  Your Care Instructions    Body mass index (BMI) can help you see if your weight is raising your risk for health problems. It uses a formula to compare how much you weigh with how tall you are. · A BMI lower than 18.5 is considered underweight. · A BMI between 18.5 and 24.9 is considered healthy. · A BMI between 25 and 29.9 is considered overweight. A BMI of 30 or higher is considered obese. If your BMI is in the normal range, it means that you have a lower risk for weight-related health problems. If your BMI is in the overweight or obese range, you may be at increased risk for weight-related health problems, such as high blood pressure, heart disease, stroke, arthritis or joint pain, and diabetes. If your BMI is in the underweight range, you may be at increased risk for health problems such as fatigue, lower protection (immunity) against illness, muscle loss, bone loss, hair loss, and hormone problems. BMI is just one measure of your risk for weight-related health problems. You may be at higher risk for health problems if you are not active, you eat an unhealthy diet, or you drink too much alcohol or use tobacco products. Follow-up care is a key part of your treatment and safety. Be sure to make and go to all appointments, and call your doctor if you are having problems. It's also a good idea to know your test results and keep a list of the medicines you take. How can you care for yourself at home? · Practice healthy eating habits. This includes eating plenty of fruits, vegetables, whole grains, lean protein, and low-fat dairy. · If your doctor recommends it, get more exercise. Walking is a good choice. Bit by bit, increase the amount you walk every day. Try for at least 30 minutes on most days of the week. · Do not smoke. Smoking can increase your risk for health problems. If you need help quitting, talk to your doctor about stop-smoking programs and medicines. These can increase your chances of quitting for good. · Limit alcohol to 2 drinks a day for men and 1 drink a day for women. Too much alcohol can cause health problems. If you have a BMI higher than 25  · Your doctor may do other tests to check your risk for weight-related health problems. This may include measuring the distance around your waist. A waist measurement of more than 40 inches in men or 35 inches in women can increase the risk of weight-related health problems. · Talk with your doctor about steps you can take to stay healthy or improve your health. You may need to make lifestyle changes to lose weight and stay healthy, such as changing your diet and getting regular exercise. If you have a BMI lower than 18.5  · Your doctor may do other tests to check your risk for health problems. · Talk with your doctor about steps you can take to stay healthy or improve your health. You may need to make lifestyle changes to gain or maintain weight and stay healthy, such as getting more healthy foods in your diet and doing exercises to build muscle. Where can you learn more? Go to http://james-dia.info/. Enter S176 in the search box to learn more about \"Body Mass Index: Care Instructions. \"  Current as of: October 13, 2016  Content Version: 11.4  © 9737-4784 Healthwise, Incorporated. Care instructions adapted under license by [employer] (which disclaims liability or warranty for this information). If you have questions about a medical condition or this instruction, always ask your healthcare professional. [address] any warranty or liability for your use of this information.

## 2018-09-25 DIAGNOSIS — N52.01 ERECTILE DYSFUNCTION DUE TO ARTERIAL INSUFFICIENCY: ICD-10-CM

## 2018-09-26 RX ORDER — TADALAFIL 5 MG/1
TABLET, FILM COATED ORAL
Qty: 30 TAB | Refills: 2 | Status: SHIPPED | OUTPATIENT
Start: 2018-09-26 | End: 2018-11-25

## 2018-11-25 ENCOUNTER — APPOINTMENT (OUTPATIENT)
Dept: GENERAL RADIOLOGY | Age: 63
DRG: 286 | End: 2018-11-25
Attending: EMERGENCY MEDICINE
Payer: COMMERCIAL

## 2018-11-25 ENCOUNTER — APPOINTMENT (OUTPATIENT)
Dept: CT IMAGING | Age: 63
DRG: 286 | End: 2018-11-25
Attending: EMERGENCY MEDICINE
Payer: COMMERCIAL

## 2018-11-25 ENCOUNTER — HOSPITAL ENCOUNTER (INPATIENT)
Age: 63
LOS: 8 days | Discharge: ANOTHER HOSPITAL | DRG: 286 | End: 2018-12-03
Attending: EMERGENCY MEDICINE | Admitting: FAMILY MEDICINE
Payer: COMMERCIAL

## 2018-11-25 DIAGNOSIS — I48.91 ATRIAL FIBRILLATION WITH RAPID VENTRICULAR RESPONSE (HCC): Primary | ICD-10-CM

## 2018-11-25 DIAGNOSIS — R07.9 ACUTE CHEST PAIN: ICD-10-CM

## 2018-11-25 LAB
ALBUMIN SERPL-MCNC: 3.7 G/DL (ref 3.5–5)
ALBUMIN/GLOB SERPL: 1.1 {RATIO} (ref 1.1–2.2)
ALP SERPL-CCNC: 54 U/L (ref 45–117)
ALT SERPL-CCNC: 67 U/L (ref 12–78)
AMPHET UR QL SCN: NEGATIVE
ANION GAP SERPL CALC-SCNC: 9 MMOL/L (ref 5–15)
APPEARANCE UR: CLEAR
AST SERPL-CCNC: 26 U/L (ref 15–37)
BACTERIA URNS QL MICRO: NEGATIVE /HPF
BARBITURATES UR QL SCN: NEGATIVE
BASOPHILS # BLD: 0 K/UL (ref 0–0.1)
BASOPHILS NFR BLD: 0 % (ref 0–1)
BENZODIAZ UR QL: NEGATIVE
BILIRUB SERPL-MCNC: 1.1 MG/DL (ref 0.2–1)
BILIRUB UR QL: NEGATIVE
BNP SERPL-MCNC: 2639 PG/ML (ref 0–125)
BUN SERPL-MCNC: 14 MG/DL (ref 6–20)
BUN/CREAT SERPL: 13 (ref 12–20)
CALCIUM SERPL-MCNC: 9 MG/DL (ref 8.5–10.1)
CANNABINOIDS UR QL SCN: NEGATIVE
CHLORIDE SERPL-SCNC: 104 MMOL/L (ref 97–108)
CK MB CFR SERPL CALC: NORMAL % (ref 0–2.5)
CK MB SERPL-MCNC: <1 NG/ML (ref 5–25)
CK SERPL-CCNC: 39 U/L (ref 39–308)
CO2 SERPL-SCNC: 26 MMOL/L (ref 21–32)
COCAINE UR QL SCN: NEGATIVE
COLOR UR: ABNORMAL
COMMENT, HOLDF: NORMAL
CREAT SERPL-MCNC: 1.1 MG/DL (ref 0.7–1.3)
DIFFERENTIAL METHOD BLD: ABNORMAL
DRUG SCRN COMMENT,DRGCM: NORMAL
EOSINOPHIL # BLD: 0 K/UL (ref 0–0.4)
EOSINOPHIL NFR BLD: 0 % (ref 0–7)
EPITH CASTS URNS QL MICRO: ABNORMAL /LPF
ERYTHROCYTE [DISTWIDTH] IN BLOOD BY AUTOMATED COUNT: 13.9 % (ref 11.5–14.5)
GLOBULIN SER CALC-MCNC: 3.5 G/DL (ref 2–4)
GLUCOSE SERPL-MCNC: 120 MG/DL (ref 65–100)
GLUCOSE UR STRIP.AUTO-MCNC: NEGATIVE MG/DL
HCT VFR BLD AUTO: 45.4 % (ref 36.6–50.3)
HGB BLD-MCNC: 14.5 G/DL (ref 12.1–17)
HGB UR QL STRIP: NEGATIVE
HYALINE CASTS URNS QL MICRO: ABNORMAL /LPF (ref 0–5)
IMM GRANULOCYTES # BLD: 0 K/UL (ref 0–0.04)
IMM GRANULOCYTES NFR BLD AUTO: 0 % (ref 0–0.5)
KETONES UR QL STRIP.AUTO: 15 MG/DL
LEUKOCYTE ESTERASE UR QL STRIP.AUTO: NEGATIVE
LYMPHOCYTES # BLD: 1.3 K/UL (ref 0.8–3.5)
LYMPHOCYTES NFR BLD: 12 % (ref 12–49)
MAGNESIUM SERPL-MCNC: 2.1 MG/DL (ref 1.6–2.4)
MCH RBC QN AUTO: 27.2 PG (ref 26–34)
MCHC RBC AUTO-ENTMCNC: 31.9 G/DL (ref 30–36.5)
MCV RBC AUTO: 85.2 FL (ref 80–99)
METHADONE UR QL: NEGATIVE
MONOCYTES # BLD: 1.3 K/UL (ref 0–1)
MONOCYTES NFR BLD: 12 % (ref 5–13)
NEUTS SEG # BLD: 8.2 K/UL (ref 1.8–8)
NEUTS SEG NFR BLD: 75 % (ref 32–75)
NITRITE UR QL STRIP.AUTO: NEGATIVE
NRBC # BLD: 0 K/UL (ref 0–0.01)
NRBC BLD-RTO: 0 PER 100 WBC
OPIATES UR QL: NEGATIVE
PCP UR QL: NEGATIVE
PH UR STRIP: 5.5 [PH] (ref 5–8)
PLATELET # BLD AUTO: 189 K/UL (ref 150–400)
PMV BLD AUTO: 10.3 FL (ref 8.9–12.9)
POTASSIUM SERPL-SCNC: 4 MMOL/L (ref 3.5–5.1)
PROT SERPL-MCNC: 7.2 G/DL (ref 6.4–8.2)
PROT UR STRIP-MCNC: NEGATIVE MG/DL
RBC # BLD AUTO: 5.33 M/UL (ref 4.1–5.7)
RBC #/AREA URNS HPF: ABNORMAL /HPF (ref 0–5)
SAMPLES BEING HELD,HOLD: NORMAL
SODIUM SERPL-SCNC: 139 MMOL/L (ref 136–145)
SP GR UR REFRACTOMETRY: <1.005 (ref 1–1.03)
TROPONIN I BLD-MCNC: <0.04 NG/ML (ref 0–0.08)
TROPONIN I SERPL-MCNC: <0.05 NG/ML
TSH SERPL DL<=0.05 MIU/L-ACNC: 1.74 UIU/ML (ref 0.36–3.74)
UROBILINOGEN UR QL STRIP.AUTO: 0.2 EU/DL (ref 0.2–1)
WBC # BLD AUTO: 10.9 K/UL (ref 4.1–11.1)
WBC URNS QL MICRO: ABNORMAL /HPF (ref 0–4)

## 2018-11-25 PROCEDURE — 96376 TX/PRO/DX INJ SAME DRUG ADON: CPT

## 2018-11-25 PROCEDURE — 74011250636 HC RX REV CODE- 250/636: Performed by: FAMILY MEDICINE

## 2018-11-25 PROCEDURE — 74011250636 HC RX REV CODE- 250/636: Performed by: EMERGENCY MEDICINE

## 2018-11-25 PROCEDURE — 96365 THER/PROPH/DIAG IV INF INIT: CPT

## 2018-11-25 PROCEDURE — 96375 TX/PRO/DX INJ NEW DRUG ADDON: CPT

## 2018-11-25 PROCEDURE — 96368 THER/DIAG CONCURRENT INF: CPT

## 2018-11-25 PROCEDURE — 74011000250 HC RX REV CODE- 250

## 2018-11-25 PROCEDURE — 36415 COLL VENOUS BLD VENIPUNCTURE: CPT

## 2018-11-25 PROCEDURE — 74011000258 HC RX REV CODE- 258: Performed by: EMERGENCY MEDICINE

## 2018-11-25 PROCEDURE — 93005 ELECTROCARDIOGRAM TRACING: CPT

## 2018-11-25 PROCEDURE — 84484 ASSAY OF TROPONIN QUANT: CPT

## 2018-11-25 PROCEDURE — 71275 CT ANGIOGRAPHY CHEST: CPT

## 2018-11-25 PROCEDURE — 80307 DRUG TEST PRSMV CHEM ANLYZR: CPT

## 2018-11-25 PROCEDURE — 74011250637 HC RX REV CODE- 250/637: Performed by: FAMILY MEDICINE

## 2018-11-25 PROCEDURE — 84443 ASSAY THYROID STIM HORMONE: CPT

## 2018-11-25 PROCEDURE — 74011000250 HC RX REV CODE- 250: Performed by: FAMILY MEDICINE

## 2018-11-25 PROCEDURE — 65610000006 HC RM INTENSIVE CARE

## 2018-11-25 PROCEDURE — 74011636320 HC RX REV CODE- 636/320: Performed by: RADIOLOGY

## 2018-11-25 PROCEDURE — 92960 CARDIOVERSION ELECTRIC EXT: CPT

## 2018-11-25 PROCEDURE — 81001 URINALYSIS AUTO W/SCOPE: CPT

## 2018-11-25 PROCEDURE — 85025 COMPLETE CBC W/AUTO DIFF WBC: CPT

## 2018-11-25 PROCEDURE — 83735 ASSAY OF MAGNESIUM: CPT

## 2018-11-25 PROCEDURE — 71045 X-RAY EXAM CHEST 1 VIEW: CPT

## 2018-11-25 PROCEDURE — 74011250636 HC RX REV CODE- 250/636

## 2018-11-25 PROCEDURE — 99285 EMERGENCY DEPT VISIT HI MDM: CPT

## 2018-11-25 PROCEDURE — 74011250636 HC RX REV CODE- 250/636: Performed by: STUDENT IN AN ORGANIZED HEALTH CARE EDUCATION/TRAINING PROGRAM

## 2018-11-25 PROCEDURE — 83880 ASSAY OF NATRIURETIC PEPTIDE: CPT

## 2018-11-25 PROCEDURE — 80053 COMPREHEN METABOLIC PANEL: CPT

## 2018-11-25 PROCEDURE — 82550 ASSAY OF CK (CPK): CPT

## 2018-11-25 PROCEDURE — 99291 CRITICAL CARE FIRST HOUR: CPT

## 2018-11-25 RX ORDER — FENTANYL CITRATE 50 UG/ML
50 INJECTION, SOLUTION INTRAMUSCULAR; INTRAVENOUS ONCE
Status: DISCONTINUED | OUTPATIENT
Start: 2018-11-25 | End: 2018-11-25

## 2018-11-25 RX ORDER — SODIUM CHLORIDE 0.9 % (FLUSH) 0.9 %
5-10 SYRINGE (ML) INJECTION EVERY 8 HOURS
Status: DISCONTINUED | OUTPATIENT
Start: 2018-11-25 | End: 2018-12-03 | Stop reason: HOSPADM

## 2018-11-25 RX ORDER — PROPOFOL 10 MG/ML
40 INJECTION, EMULSION INTRAVENOUS
Status: DISPENSED | OUTPATIENT
Start: 2018-11-25 | End: 2018-11-26

## 2018-11-25 RX ORDER — SODIUM CHLORIDE 9 MG/ML
75 INJECTION, SOLUTION INTRAVENOUS CONTINUOUS
Status: DISCONTINUED | OUTPATIENT
Start: 2018-11-25 | End: 2018-11-26

## 2018-11-25 RX ORDER — FENTANYL CITRATE 50 UG/ML
50 INJECTION, SOLUTION INTRAMUSCULAR; INTRAVENOUS
Status: COMPLETED | OUTPATIENT
Start: 2018-11-25 | End: 2018-11-25

## 2018-11-25 RX ORDER — FENTANYL CITRATE 50 UG/ML
50 INJECTION, SOLUTION INTRAMUSCULAR; INTRAVENOUS ONCE
Status: COMPLETED | OUTPATIENT
Start: 2018-11-25 | End: 2018-11-25

## 2018-11-25 RX ORDER — FENTANYL CITRATE 50 UG/ML
INJECTION, SOLUTION INTRAMUSCULAR; INTRAVENOUS
Status: COMPLETED
Start: 2018-11-25 | End: 2018-11-25

## 2018-11-25 RX ORDER — MAGNESIUM SULFATE HEPTAHYDRATE 40 MG/ML
2 INJECTION, SOLUTION INTRAVENOUS ONCE
Status: COMPLETED | OUTPATIENT
Start: 2018-11-25 | End: 2018-11-25

## 2018-11-25 RX ORDER — TAMSULOSIN HYDROCHLORIDE 0.4 MG/1
0.4 CAPSULE ORAL
Status: CANCELLED | OUTPATIENT
Start: 2018-11-25

## 2018-11-25 RX ORDER — DILTIAZEM HYDROCHLORIDE 5 MG/ML
10 INJECTION INTRAVENOUS
Status: COMPLETED | OUTPATIENT
Start: 2018-11-25 | End: 2018-11-25

## 2018-11-25 RX ORDER — ENOXAPARIN SODIUM 100 MG/ML
40 INJECTION SUBCUTANEOUS EVERY 24 HOURS
Status: DISCONTINUED | OUTPATIENT
Start: 2018-11-25 | End: 2018-11-26

## 2018-11-25 RX ORDER — SODIUM CHLORIDE 0.9 % (FLUSH) 0.9 %
5-10 SYRINGE (ML) INJECTION AS NEEDED
Status: DISCONTINUED | OUTPATIENT
Start: 2018-11-25 | End: 2018-11-27

## 2018-11-25 RX ORDER — KETOROLAC TROMETHAMINE 30 MG/ML
30 INJECTION, SOLUTION INTRAMUSCULAR; INTRAVENOUS
Status: COMPLETED | OUTPATIENT
Start: 2018-11-25 | End: 2018-11-25

## 2018-11-25 RX ORDER — TAMSULOSIN HYDROCHLORIDE 0.4 MG/1
0.4 CAPSULE ORAL
COMMUNITY
End: 2019-02-27 | Stop reason: SDUPTHER

## 2018-11-25 RX ORDER — SODIUM CHLORIDE 0.9 % (FLUSH) 0.9 %
5-10 SYRINGE (ML) INJECTION EVERY 8 HOURS
Status: DISCONTINUED | OUTPATIENT
Start: 2018-11-25 | End: 2018-11-27

## 2018-11-25 RX ORDER — DILTIAZEM HYDROCHLORIDE 5 MG/ML
INJECTION INTRAVENOUS
Status: COMPLETED
Start: 2018-11-25 | End: 2018-11-25

## 2018-11-25 RX ORDER — SODIUM CHLORIDE 0.9 % (FLUSH) 0.9 %
5-10 SYRINGE (ML) INJECTION AS NEEDED
Status: DISCONTINUED | OUTPATIENT
Start: 2018-11-25 | End: 2018-12-03 | Stop reason: HOSPADM

## 2018-11-25 RX ORDER — IBUPROFEN 200 MG
200 TABLET ORAL
COMMUNITY
End: 2018-12-05

## 2018-11-25 RX ADMIN — FENTANYL CITRATE 50 MCG: 50 INJECTION, SOLUTION INTRAMUSCULAR; INTRAVENOUS at 17:09

## 2018-11-25 RX ADMIN — SODIUM CHLORIDE 130 ML/HR: 900 INJECTION, SOLUTION INTRAVENOUS at 21:36

## 2018-11-25 RX ADMIN — ENOXAPARIN SODIUM 40 MG: 40 INJECTION SUBCUTANEOUS at 21:37

## 2018-11-25 RX ADMIN — DILTIAZEM HYDROCHLORIDE 10 MG: 5 INJECTION INTRAVENOUS at 16:59

## 2018-11-25 RX ADMIN — FENTANYL CITRATE 50 MCG: 50 INJECTION, SOLUTION INTRAMUSCULAR; INTRAVENOUS at 18:10

## 2018-11-25 RX ADMIN — MAGNESIUM SULFATE HEPTAHYDRATE 2 G: 40 INJECTION, SOLUTION INTRAVENOUS at 18:48

## 2018-11-25 RX ADMIN — SODIUM CHLORIDE 1 MG: 900 INJECTION, SOLUTION INTRAVENOUS at 18:19

## 2018-11-25 RX ADMIN — Medication 10 ML: at 21:38

## 2018-11-25 RX ADMIN — KETOROLAC TROMETHAMINE 30 MG: 30 INJECTION, SOLUTION INTRAMUSCULAR at 20:41

## 2018-11-25 RX ADMIN — LIDOCAINE HYDROCHLORIDE 40 ML: 20 SOLUTION ORAL; TOPICAL at 21:37

## 2018-11-25 RX ADMIN — IOPAMIDOL 100 ML: 755 INJECTION, SOLUTION INTRAVENOUS at 18:10

## 2018-11-25 NOTE — CONSULTS
Reason for Consult: Chest pain. HPI: Severo Antu is a 70 y.o. male with no significant past medical history other than sleep apnea is here with symptoms of chest pain. Symptoms started last night. Symptoms are described as having chest pain retrosternal anterior chest wall and left-sided chest wall. Symptoms were initially moderate and progressively has worsened since yesterday night. He initially thought that the symptoms were related to his reflux. Today his symptoms have been so bad that he is unable to even take a deep breath in which causes significant pain in the chest.  He presented to the ER at which time he was noted to have heart rate of 170 bpm.  He was in atrial flutter. Because he has significant chest pain it was decided that we should go ahead and proceed with emergent cardioversion. He was given fentanyl for chest pain which dropped his blood pressure and therefore he could not be given much sedation and underwent cardioversion with 100 J while he was awake but that did not convert him to sinus rhythm. His heart rate however has slowed down to 130s-140s. At the time when he I was evaluating him his pain was returning back. He denies any symptoms or shortness of breath. He has been having symptoms of dizziness and vertigo from last 1 year and has undergone ENT evaluation and was noted to have left middle ear problem leading to vertigo and has been taking antivertigo medication. In the last many years she has lost 50-60 pounds by exercising. He is otherwise in good health with regular exercises. EKG at the presentation was personally reviewed which demonstrated atrial flutter with heart rate of 164 bpm with nonspecific ST changes. POC troponin is negative. Plan:    1. Atrial flutter with rapid ventricular response: Unsuccessful cardioversion with low dose of 100 J. Could not repeat cardioversion because unable to sedate him due to marginal blood pressure. Therefore we will use Corvert to see if he will convert with Corvert. His potassium and magnesium are normal.  He can get the dose of 2 g of magnesium after the Corvert. Echocardiogram in the morning. Check TSH. His chads score is 0 therefore we can just give him full dose aspirin 325 mg p.o.    2.  Chest pain: The pleuritic chest pain is of unclear etiology. Further rule out of pulmonary embolism as well as aortic dissection is required given the severity of chest pain and the nature of chest pain. I do not think that this is ischemic chest pain. Nevertheless we will check his troponin and trend his troponin. 3.  Sleep apnea: Currently does not use CPAP for last many years. Need to encourage him to start using his CPAP again. Past Medical History:   Diagnosis Date    Anxiety     BPH     Depression     ED (erectile dysfunction)     Hearing loss     Herniated disc     Hypogonadism     Sleep apnea             Past Surgical History:   Procedure Laterality Date    ECHO STRESS      HX HERNIA REPAIR      HX ORTHOPAEDIC      knee surgery             Family History   Problem Relation Age of Onset    Cancer Mother     Diabetes Father     Hypertension Father     Dementia Father     Cancer Sister            Social History     Socioeconomic History    Marital status: SINGLE     Spouse name: Not on file    Number of children: Not on file    Years of education: Not on file    Highest education level: Not on file   Social Needs    Financial resource strain: Not on file    Food insecurity - worry: Not on file    Food insecurity - inability: Not on file   [employer] needs - medical: Not on file   [employer] needs - non-medical: Not on file   Occupational History    Not on file   Tobacco Use    Smoking status: Former Smoker    Smokeless tobacco: Never Used   Substance and Sexual Activity    Alcohol use:  Yes     Alcohol/week: 3.0 oz     Types: 6 Cans of beer per week Comment: social    Drug use: No    Sexual activity: Yes   Other Topics Concern    Not on file   Social History Narrative    Not on file         No Known Allergies         Current Facility-Administered Medications   Medication Dose Route Frequency Provider Last Rate Last Dose    sodium chloride (NS) flush 5-10 mL  5-10 mL IntraVENous Q8H Graciela CUADRA MD        sodium chloride (NS) flush 5-10 mL  5-10 mL IntraVENous PRN Cherylene Sawyer, MD        dilTIAZem (CARDIZEM) 125 mg in dextrose 5% 125 mL infusion  0-15 mg/hr IntraVENous TITRATE Cherylene Sawyer, MD   Stopped at 11/25/18 1653    iopamidol (ISOVUE-370) 76 % injection 100 mL  100 mL IntraVENous RAD ONCE Pacious, Gallo Johnson MD        propofol (DIPRIVAN) 10 mg/mL injection 40 mg  40 mg IntraVENous NOW Cherylene Sawyer, MD   Stopped at 11/25/18 1712    ibutilide (CORVERT) 1 mg in 0.9% sodium chloride infusion  1 mg IntraVENous ONCE Cherylene Sawyer, MD        fentaNYL citrate (PF) injection 50 mcg  50 mcg IntraVENous ONCE Cherylene Sawyer, MD         Current Outpatient Medications   Medication Sig Dispense Refill    CIALIS 5 mg tablet TAKE ONE TABLET BY MOUTH DAILY AS NEEDED 30 Tab 2    temazepam (RESTORIL) 15 mg capsule Take 1 Cap by mouth nightly as needed for Sleep. Max Daily Amount: 15 mg. D/c klonopin 30 Cap 1    montelukast (SINGULAIR) 10 mg tablet Take 1 Tab by mouth daily. 30 Tab 5    meclizine (ANTIVERT) 12.5 mg tablet Take 1 Tab by mouth three (3) times daily as needed. For vertigo 30 Tab 2    cetirizine (ZYRTEC) 10 mg tablet Take 1 Tab by mouth daily. 30 Tab 5    fluticasone (FLONASE) 50 mcg/actuation nasal spray 2 Sprays by Both Nostrils route daily. 1 Bottle 5    tamsulosin (FLOMAX) 0.4 mg capsule take 1 capsule by mouth once daily 30 capsule 5        ROS:  12 point review of systems was performed.  All negative except for HPI     Physical Exam:  Visit Vitals  /88 (BP 1 Location: Right arm, BP Patient Position: Sitting)   Pulse (!) 142   Temp 99.5 °F (37.5 °C)   Resp 24   Ht 5' 11\" (1.803 m)   Wt 200 lb (90.7 kg)   SpO2 93%   BMI 27.89 kg/m²       Gen:  Well-developed, well-nourished, in visible distress with significant pain. HEENT:  Pink conjunctivae, PERRL, hearing intact to voice, moist mucous membranes  Neck:  Supple, without masses, thyroid non-tender  Resp:  No accessory muscle use, clear breath sounds without wheezes rales or rhonchi  Card:  No murmurs, normal S1, S2 without thrills, bruits or peripheral edema  Abd:  Soft, non-tender, non-distended, normoactive bowel sounds are present, no palpable organomegaly and no detectable hernias  Lymph:  No cervical or inguinal adenopathy  Musc:  No cyanosis or clubbing  Skin:  No rashes or ulcers, skin turgor is good  Neuro:  Cranial nerves are grossly intact, no focal motor weakness, follows commands appropriately  Psych:  Good insight, oriented to person, place and time, alert     Labs:     Lab Results   Component Value Date/Time    WBC 10.9 11/25/2018 04:58 PM    HGB 14.5 11/25/2018 04:58 PM    HCT 45.4 11/25/2018 04:58 PM    PLATELET 472 18/89/2566 04:58 PM    MCV 85.2 11/25/2018 04:58 PM     Lab Results   Component Value Date/Time    Hemoglobin A1c 6.0 (H) 08/30/2011 08:15 AM    Hemoglobin A1c 5.9 (H) 04/14/2011 08:59 AM    Hemoglobin A1c 5.8 (H) 01/04/2011 09:03 AM    Glucose 120 (H) 11/25/2018 04:58 PM    LDL, calculated 113 (H) 02/08/2018 08:20 AM    Creatinine 1.10 11/25/2018 04:58 PM      Lab Results   Component Value Date/Time    Cholesterol, total 176 02/08/2018 08:20 AM    HDL Cholesterol 25 (L) 02/08/2018 08:20 AM    LDL, calculated 113 (H) 02/08/2018 08:20 AM    Triglyceride 192 (H) 02/08/2018 08:20 AM     Lab Results   Component Value Date/Time    ALT (SGPT) 67 11/25/2018 04:58 PM    AST (SGOT) 26 11/25/2018 04:58 PM    Alk.  phosphatase 54 11/25/2018 04:58 PM    Bilirubin, total 1.1 (H) 11/25/2018 04:58 PM    Albumin 3.7 11/25/2018 04:58 PM    Protein, total 7.2 11/25/2018 04:58 PM    PLATELET 852 91/08/0928 04:58 PM     No results found for: INR, PTMR, PTP, PT1, PT2   Lab Results   Component Value Date/Time    GFR est non-AA >60 11/25/2018 04:58 PM    GFR est AA >60 11/25/2018 04:58 PM    Creatinine 1.10 11/25/2018 04:58 PM    BUN 14 11/25/2018 04:58 PM    Sodium 139 11/25/2018 04:58 PM    Potassium 4.0 11/25/2018 04:58 PM    Chloride 104 11/25/2018 04:58 PM    CO2 26 11/25/2018 04:58 PM    Magnesium 2.1 11/25/2018 04:58 PM     Lab Results   Component Value Date/Time    Prostate Specific Ag 1.6 02/08/2018 08:20 AM    Prostate Specific Ag 1.4 08/30/2011 08:15 AM    Prostate Specific Ag 1.4 04/14/2011 08:59 AM     Lab Results   Component Value Date/Time    TSH 2.580 02/08/2018 08:20 AM      Lab Results   Component Value Date/Time    Glucose 120 (H) 11/25/2018 04:58 PM      No results found for: CPK, RCK1, RCK2, RCK3, RCK4, CKMB, CKNDX, CKND1, TROPT, TROIQ, BNPP, BNP   No results found for: BNP, BNPP, BNPPPOC, XBNPT, BNPNT   Lab Results   Component Value Date/Time    Sodium 139 11/25/2018 04:58 PM    Potassium 4.0 11/25/2018 04:58 PM    Chloride 104 11/25/2018 04:58 PM    CO2 26 11/25/2018 04:58 PM    Anion gap 9 11/25/2018 04:58 PM    Glucose 120 (H) 11/25/2018 04:58 PM    BUN 14 11/25/2018 04:58 PM    Creatinine 1.10 11/25/2018 04:58 PM    BUN/Creatinine ratio 13 11/25/2018 04:58 PM    GFR est AA >60 11/25/2018 04:58 PM    GFR est non-AA >60 11/25/2018 04:58 PM    Calcium 9.0 11/25/2018 04:58 PM      Lab Results   Component Value Date/Time    Sodium 139 11/25/2018 04:58 PM    Potassium 4.0 11/25/2018 04:58 PM    Chloride 104 11/25/2018 04:58 PM    CO2 26 11/25/2018 04:58 PM    Anion gap 9 11/25/2018 04:58 PM    Glucose 120 (H) 11/25/2018 04:58 PM    BUN 14 11/25/2018 04:58 PM    Creatinine 1.10 11/25/2018 04:58 PM    BUN/Creatinine ratio 13 11/25/2018 04:58 PM    GFR est AA >60 11/25/2018 04:58 PM    GFR est non-AA >60 11/25/2018 04:58 PM    Calcium 9.0 11/25/2018 04:58 PM Bilirubin, total 1.1 (H) 11/25/2018 04:58 PM    ALT (SGPT) 67 11/25/2018 04:58 PM    AST (SGOT) 26 11/25/2018 04:58 PM    Alk. phosphatase 54 11/25/2018 04:58 PM    Protein, total 7.2 11/25/2018 04:58 PM    Albumin 3.7 11/25/2018 04:58 PM    Globulin 3.5 11/25/2018 04:58 PM    A-G Ratio 1.1 11/25/2018 04:58 PM      Lab Results   Component Value Date/Time    Hemoglobin A1c 6.0 (H) 08/30/2011 08:15 AM         No results for input(s): CPK, CKMB, TROIQ in the last 72 hours. No lab exists for component: CKQMB, CPKMB        Problem List:     Problem List  Date Reviewed: 8/8/2018          Codes Class Noted    Memory loss ICD-10-CM: R41.3  ICD-9-CM: 780.93  9/4/2014        Lumbar radiculitis ICD-10-CM: M54.16  ICD-9-CM: 724.4  7/18/2013    Overview Signed 7/18/2013  3:40 PM by Paula Mccoy NP     Pt sees advanced ortho and receives epidural steroid injections. Hypogonadism male ICD-10-CM: E29.1  ICD-9-CM: 257.2  4/14/2011        Hyperlipemia ICD-10-CM: E78.5  ICD-9-CM: 272.4  2/4/2011        Pre-diabetes ICD-10-CM: R73.03  ICD-9-CM: 790.29  1/6/2011        Sleep apnea ICD-10-CM: G47.30  ICD-9-CM: 780.57  12/21/2010        RAMIREZ (obstructive sleep apnea) ICD-10-CM: G47.33  ICD-9-CM: 327.23  5/17/2010    Overview Signed 5/17/2010 10:47 AM by Adrianna ZUNIGA     cpap             ED (erectile dysfunction) ICD-10-CM: N52.9  ICD-9-CM: 607.84  5/17/2010        Hypogonadism ICD-9-CM: Michael Graves  5/17/2010        Seasonal allergic reaction ICD-10-CM: J30.2  ICD-9-CM: 477.9  5/17/2010        Depression with anxiety ICD-10-CM: F41.8  ICD-9-CM: 300.4  5/17/2010                Fred Philippe MD, [facility]

## 2018-11-25 NOTE — ED TRIAGE NOTES
Pt seen at [facility] for chest pain today, thinking it was reflux. Pain began last night. PT sent here for A. Fibb w/ RVR. -180 bpm. Pt vomited x 1 this afternoon. Griselda Guillory RN.

## 2018-11-25 NOTE — ED PROVIDER NOTES
Bernardino Solo is a 70 y.o. male who presents to the ED via EMS with a c/o sharp chest pain, shortness of breath onset yesterday. Pt currently rates his pain at 8/10 in severity. Pt states that his sx began yesterday and he reports that he \"felt like he was having indigestion or acid reflux\". Then, at 4:00 this morning he began having chest pain which progressively worsened since this AM.  Pt originally presented to [facility] around 1500 this afternoon but was sent to ED for further evaluation after he was found to be in A-fib with RVR. EMS report that pt's heart rate fluctuated between 160-180 bpm. Pt reports one episode of n/v today. Pt specifically denies diarrhea, fever, chills, numbness, tingling, abdominal pain, back pain, cough, leg swelling, dizziness or any other acute sx. Pt denies any cardiac hx. Pt denies any recent travel, known sick contacts, or recent illness. PCP: Crispin Cash, DO 
PMHx significant for: Sleep apnea, BPH, Anxiety, Depression,Hypogonadism PSHx significant for: Knee Surgery, Hernia Repair, Echo Stress Test 
Social Hx: Tobacco: Former Smoker EtOH: occaisional Illicit drug use: none There are no further complaints or symptoms at this time. Signed by: Renetta Omalley, scribing for Ellis De Los Santos on November 25th, 2018 at 17:02pm. 
 
 
 
 
  
 
Past Medical History:  
Diagnosis Date  Anxiety  BPH  Depression  ED (erectile dysfunction)  Hearing loss  Herniated disc  Hypogonadism  Sleep apnea Past Surgical History:  
Procedure Laterality Date  ECHO STRESS    
 HX HERNIA REPAIR    
 HX ORTHOPAEDIC    
 knee surgery Family History:  
Problem Relation Age of Onset  Cancer Mother  Diabetes Father  Hypertension Father  Dementia Father  Cancer Sister Social History Socioeconomic History  Marital status: SINGLE Spouse name: Not on file  Number of children: Not on file  Years of education: Not on file  Highest education level: Not on file Social Needs  Financial resource strain: Not on file  Food insecurity - worry: Not on file  Food insecurity - inability: Not on file  Transportation needs - medical: Not on file  Transportation needs - non-medical: Not on file Occupational History  Not on file Tobacco Use  Smoking status: Former Smoker  Smokeless tobacco: Never Used Substance and Sexual Activity  Alcohol use: Yes Alcohol/week: 3.0 oz Types: 6 Cans of beer per week Comment: social  
 Drug use: No  
 Sexual activity: Yes Other Topics Concern  Not on file Social History Narrative  Not on file ALLERGIES: Patient has no known allergies. Review of Systems Constitutional: Negative for chills and fever. Respiratory: Positive for shortness of breath. Negative for cough. Cardiovascular: Positive for chest pain. Negative for leg swelling. Gastrointestinal: Positive for nausea and vomiting. Musculoskeletal: Negative for back pain and neck pain. Neurological: Negative for headaches. All other systems reviewed and are negative. Vitals:  
 11/25/18 1645 BP: 105/76 Pulse: (!) 178 Resp: 23 Temp: 98 °F (36.7 °C) SpO2: 94% Weight: 90.7 kg (200 lb) Height: 5' 11\" (1.803 m) Physical Exam  
Constitutional: He is oriented to person, place, and time. He appears well-developed and well-nourished. He appears distressed. Uncomfortable;writhing in bed HENT:  
Head: Normocephalic and atraumatic. Eyes: Conjunctivae and EOM are normal. Pupils are equal, round, and reactive to light. No scleral icterus. Neck: Neck supple. Cardiovascular: Normal heart sounds, intact distal pulses and normal pulses. An irregularly irregular rhythm present. Tachycardia present. Pulmonary/Chest: Effort normal and breath sounds normal. No respiratory distress. Abdominal: Soft. He exhibits no distension and no mass. There is no tenderness. There is no rebound and no guarding. Musculoskeletal: Normal range of motion. He exhibits no edema or tenderness. Neurological: He is alert and oriented to person, place, and time. Skin: Skin is warm and dry. No rash noted. No erythema. Psychiatric: He has a normal mood and affect. His behavior is normal.  
Nursing note and vitals reviewed. Signed by: Shiva Culp scribmaria g for Reid Alatorre on November 25th, 2018 at 17:02pm. 
 
 
MDM Cardioversion, electrical 
Date/Time: 11/25/2018 5:43 PM 
Performed by: Tayla Harvey MD 
Authorized by: Tayla Harvey MD  
 
Consent:  
  Consent obtained:  Written Consent given by:  Patient Risks discussed:  Pain Pre-procedure details:  
  Cardioversion basis:  Emergent Rhythm:  Atrial fibrillation Electrode placement:  Anterior-posterior Attempt one:  
  Cardioversion mode:  Synchronous Waveform:  Biphasic Shock (Joules):  100 Shock outcome:  No change in rhythm Post-procedure details:  
  Patient status:  Awake Patient tolerance of procedure: Tolerated well, no immediate complications ED EKG interpretation: 
Rhythm: atrial fib; and irregular. Rate (approx.): 164; Axis: normal; ST/T wave: non-specific changes; This EKG was interpreted by Reid Alatorre MD,ED Provider. 5:46 PM 
Darya Cowartcatracho is in ED evaluating patient. Total critical care time spent exclusive of procedures:  35 min. Reid Alatorre MD 
5:51 PM 
 
Consult note: Dr Pete Washburn informed of 9 beat episode of V-tach; recommends 2g Mg and then 2nd dose Coreg. Reid Alatorre MD 
6:39 PM 
  
Consult note: SF - will admit. Reid Alatorre MD 
6:53 PM 
 
A/P: A-fib (new onset) with RVR - experiencing severe CP as well; attempted electrical cardioversion without success; trop normal; CTA chest/abd ok; currently getting Corvert/Mg; having some beats of V-tach. Admit to [facility]; cards involved.   Hellen Dailey MD 
6:56 PM

## 2018-11-25 NOTE — ED NOTES
Per dr leiva cardioversion without sedation related to low blood pressure. 1731 shock given 100 rachael synched biphasic

## 2018-11-26 ENCOUNTER — APPOINTMENT (OUTPATIENT)
Dept: GENERAL RADIOLOGY | Age: 63
DRG: 286 | End: 2018-11-26
Attending: FAMILY MEDICINE
Payer: COMMERCIAL

## 2018-11-26 ENCOUNTER — HOME HEALTH ADMISSION (OUTPATIENT)
Dept: HOME HEALTH SERVICES | Facility: HOME HEALTH | Age: 63
End: 2018-11-26

## 2018-11-26 LAB
ANION GAP SERPL CALC-SCNC: 9 MMOL/L (ref 5–15)
APTT PPP: 29 SEC (ref 22.1–32)
APTT PPP: 32.3 SEC (ref 22.1–32)
APTT PPP: 41.9 SEC (ref 22.1–32)
ARTERIAL PATENCY WRIST A: YES
ATRIAL RATE: 138 BPM
ATRIAL RATE: 256 BPM
ATRIAL RATE: 394 BPM
BASE DEFICIT BLDA-SCNC: 2 MMOL/L
BDY SITE: ABNORMAL
BUN SERPL-MCNC: 11 MG/DL (ref 6–20)
BUN/CREAT SERPL: 11 (ref 12–20)
CALCIUM SERPL-MCNC: 7.7 MG/DL (ref 8.5–10.1)
CALCULATED R AXIS, ECG10: 3 DEGREES
CALCULATED R AXIS, ECG10: 38 DEGREES
CALCULATED R AXIS, ECG10: 38 DEGREES
CALCULATED T AXIS, ECG11: -34 DEGREES
CALCULATED T AXIS, ECG11: 53 DEGREES
CALCULATED T AXIS, ECG11: 62 DEGREES
CHLORIDE SERPL-SCNC: 106 MMOL/L (ref 97–108)
CO2 SERPL-SCNC: 23 MMOL/L (ref 21–32)
CREAT SERPL-MCNC: 1.04 MG/DL (ref 0.7–1.3)
D DIMER PPP FEU-MCNC: 1.4 MG/L FEU (ref 0–0.65)
DIAGNOSIS, 93000: NORMAL
ERYTHROCYTE [DISTWIDTH] IN BLOOD BY AUTOMATED COUNT: 13.8 % (ref 11.5–14.5)
FIO2 ON VENT: 100 %
GLUCOSE BLD STRIP.AUTO-MCNC: 122 MG/DL (ref 65–100)
GLUCOSE SERPL-MCNC: 102 MG/DL (ref 65–100)
HCO3 BLDA-SCNC: 21 MMOL/L (ref 22–26)
HCT VFR BLD AUTO: 37.6 % (ref 36.6–50.3)
HGB BLD-MCNC: 11.8 G/DL (ref 12.1–17)
MAGNESIUM SERPL-MCNC: 2.4 MG/DL (ref 1.6–2.4)
MCH RBC QN AUTO: 26.5 PG (ref 26–34)
MCHC RBC AUTO-ENTMCNC: 31.4 G/DL (ref 30–36.5)
MCV RBC AUTO: 84.3 FL (ref 80–99)
NRBC # BLD: 0 K/UL (ref 0–0.01)
NRBC BLD-RTO: 0 PER 100 WBC
PCO2 BLDA: 32 MMHG (ref 35–45)
PH BLDA: 7.44 [PH] (ref 7.35–7.45)
PLATELET # BLD AUTO: 149 K/UL (ref 150–400)
PMV BLD AUTO: 10.6 FL (ref 8.9–12.9)
PO2 BLDA: 84 MMHG (ref 80–100)
POTASSIUM SERPL-SCNC: 3.8 MMOL/L (ref 3.5–5.1)
Q-T INTERVAL, ECG07: 256 MS
Q-T INTERVAL, ECG07: 304 MS
Q-T INTERVAL, ECG07: 322 MS
QRS DURATION, ECG06: 72 MS
QRS DURATION, ECG06: 72 MS
QRS DURATION, ECG06: 74 MS
QTC CALCULATION (BEZET), ECG08: 390 MS
QTC CALCULATION (BEZET), ECG08: 493 MS
QTC CALCULATION (BEZET), ECG08: 502 MS
RBC # BLD AUTO: 4.46 M/UL (ref 4.1–5.7)
SAO2 % BLD: 97 % (ref 92–97)
SAO2% DEVICE SAO2% SENSOR NAME: ABNORMAL
SERVICE CMNT-IMP: ABNORMAL
SODIUM SERPL-SCNC: 138 MMOL/L (ref 136–145)
SPECIMEN SITE: ABNORMAL
THERAPEUTIC RANGE,PTTT: ABNORMAL SECS (ref 58–77)
THERAPEUTIC RANGE,PTTT: ABNORMAL SECS (ref 58–77)
THERAPEUTIC RANGE,PTTT: NORMAL SECS (ref 58–77)
TROPONIN I SERPL-MCNC: <0.05 NG/ML
VENTILATION MODE VENT: ABNORMAL
VENTRICULAR RATE, ECG03: 140 BPM
VENTRICULAR RATE, ECG03: 141 BPM
VENTRICULAR RATE, ECG03: 164 BPM
WBC # BLD AUTO: 6.1 K/UL (ref 4.1–11.1)

## 2018-11-26 PROCEDURE — 74011250636 HC RX REV CODE- 250/636: Performed by: NURSE PRACTITIONER

## 2018-11-26 PROCEDURE — 85730 THROMBOPLASTIN TIME PARTIAL: CPT

## 2018-11-26 PROCEDURE — 74011000258 HC RX REV CODE- 258: Performed by: FAMILY MEDICINE

## 2018-11-26 PROCEDURE — 83735 ASSAY OF MAGNESIUM: CPT

## 2018-11-26 PROCEDURE — 65660000000 HC RM CCU STEPDOWN

## 2018-11-26 PROCEDURE — 82803 BLOOD GASES ANY COMBINATION: CPT

## 2018-11-26 PROCEDURE — 85027 COMPLETE CBC AUTOMATED: CPT

## 2018-11-26 PROCEDURE — 36415 COLL VENOUS BLD VENIPUNCTURE: CPT

## 2018-11-26 PROCEDURE — 74011250637 HC RX REV CODE- 250/637: Performed by: FAMILY MEDICINE

## 2018-11-26 PROCEDURE — 93325 DOPPLER ECHO COLOR FLOW MAPG: CPT

## 2018-11-26 PROCEDURE — 74011250637 HC RX REV CODE- 250/637: Performed by: NURSE PRACTITIONER

## 2018-11-26 PROCEDURE — 82962 GLUCOSE BLOOD TEST: CPT

## 2018-11-26 PROCEDURE — 84484 ASSAY OF TROPONIN QUANT: CPT

## 2018-11-26 PROCEDURE — 71045 X-RAY EXAM CHEST 1 VIEW: CPT

## 2018-11-26 PROCEDURE — 36600 WITHDRAWAL OF ARTERIAL BLOOD: CPT

## 2018-11-26 PROCEDURE — 74011000250 HC RX REV CODE- 250: Performed by: FAMILY MEDICINE

## 2018-11-26 PROCEDURE — 74011000250 HC RX REV CODE- 250: Performed by: INTERNAL MEDICINE

## 2018-11-26 PROCEDURE — 80048 BASIC METABOLIC PNL TOTAL CA: CPT

## 2018-11-26 PROCEDURE — 93306 TTE W/DOPPLER COMPLETE: CPT

## 2018-11-26 PROCEDURE — 74011250636 HC RX REV CODE- 250/636: Performed by: STUDENT IN AN ORGANIZED HEALTH CARE EDUCATION/TRAINING PROGRAM

## 2018-11-26 PROCEDURE — 94660 CPAP INITIATION&MGMT: CPT

## 2018-11-26 PROCEDURE — 77030018729 HC ELECTRD DEFIB PAD CARD -B

## 2018-11-26 PROCEDURE — 94640 AIRWAY INHALATION TREATMENT: CPT

## 2018-11-26 PROCEDURE — 99152 MOD SED SAME PHYS/QHP 5/>YRS: CPT | Performed by: INTERNAL MEDICINE

## 2018-11-26 PROCEDURE — 85379 FIBRIN DEGRADATION QUANT: CPT

## 2018-11-26 PROCEDURE — 93005 ELECTROCARDIOGRAM TRACING: CPT

## 2018-11-26 PROCEDURE — 74011250636 HC RX REV CODE- 250/636: Performed by: FAMILY MEDICINE

## 2018-11-26 PROCEDURE — 74011250637 HC RX REV CODE- 250/637: Performed by: INTERNAL MEDICINE

## 2018-11-26 PROCEDURE — 93970 EXTREMITY STUDY: CPT

## 2018-11-26 PROCEDURE — 74011250636 HC RX REV CODE- 250/636: Performed by: INTERNAL MEDICINE

## 2018-11-26 PROCEDURE — 74011000258 HC RX REV CODE- 258: Performed by: STUDENT IN AN ORGANIZED HEALTH CARE EDUCATION/TRAINING PROGRAM

## 2018-11-26 PROCEDURE — 65270000029 HC RM PRIVATE

## 2018-11-26 PROCEDURE — 74011250637 HC RX REV CODE- 250/637: Performed by: STUDENT IN AN ORGANIZED HEALTH CARE EDUCATION/TRAINING PROGRAM

## 2018-11-26 RX ORDER — FENTANYL CITRATE 50 UG/ML
25-200 INJECTION, SOLUTION INTRAMUSCULAR; INTRAVENOUS
Status: DISCONTINUED | OUTPATIENT
Start: 2018-11-26 | End: 2018-11-27

## 2018-11-26 RX ORDER — LIDOCAINE HYDROCHLORIDE 20 MG/ML
JELLY TOPICAL ONCE
Status: COMPLETED | OUTPATIENT
Start: 2018-11-26 | End: 2018-11-26

## 2018-11-26 RX ORDER — MIDAZOLAM HYDROCHLORIDE 1 MG/ML
.5-1 INJECTION, SOLUTION INTRAMUSCULAR; INTRAVENOUS ONCE
Status: COMPLETED | OUTPATIENT
Start: 2018-11-26 | End: 2018-11-26

## 2018-11-26 RX ORDER — POTASSIUM CHLORIDE 7.45 MG/ML
10 INJECTION INTRAVENOUS
Status: COMPLETED | OUTPATIENT
Start: 2018-11-26 | End: 2018-11-26

## 2018-11-26 RX ORDER — AMIODARONE HYDROCHLORIDE 200 MG/1
200 TABLET ORAL
Status: DISCONTINUED | OUTPATIENT
Start: 2018-11-26 | End: 2018-11-26

## 2018-11-26 RX ORDER — FUROSEMIDE 10 MG/ML
40 INJECTION INTRAMUSCULAR; INTRAVENOUS ONCE
Status: COMPLETED | OUTPATIENT
Start: 2018-11-26 | End: 2018-11-26

## 2018-11-26 RX ORDER — KETOROLAC TROMETHAMINE 30 MG/ML
INJECTION, SOLUTION INTRAMUSCULAR; INTRAVENOUS
Status: DISPENSED
Start: 2018-11-26 | End: 2018-11-26

## 2018-11-26 RX ORDER — CARVEDILOL 3.12 MG/1
3.12 TABLET ORAL 2 TIMES DAILY
Status: DISCONTINUED | OUTPATIENT
Start: 2018-11-26 | End: 2018-11-27

## 2018-11-26 RX ORDER — PROCHLORPERAZINE MALEATE 5 MG
5 TABLET ORAL
Status: DISCONTINUED | OUTPATIENT
Start: 2018-11-26 | End: 2018-11-26

## 2018-11-26 RX ORDER — MIDAZOLAM HYDROCHLORIDE 1 MG/ML
.5-1 INJECTION, SOLUTION INTRAMUSCULAR; INTRAVENOUS
Status: DISCONTINUED | OUTPATIENT
Start: 2018-11-26 | End: 2018-11-26 | Stop reason: HOSPADM

## 2018-11-26 RX ORDER — HEPARIN SODIUM 10000 [USP'U]/100ML
11-25 INJECTION, SOLUTION INTRAVENOUS
Status: DISCONTINUED | OUTPATIENT
Start: 2018-11-26 | End: 2018-11-29

## 2018-11-26 RX ORDER — ACETAMINOPHEN 500 MG
500 TABLET ORAL
Status: DISCONTINUED | OUTPATIENT
Start: 2018-11-26 | End: 2018-12-03 | Stop reason: HOSPADM

## 2018-11-26 RX ORDER — HEPARIN SODIUM 5000 [USP'U]/ML
2000 INJECTION, SOLUTION INTRAVENOUS; SUBCUTANEOUS ONCE
Status: COMPLETED | OUTPATIENT
Start: 2018-11-26 | End: 2018-11-26

## 2018-11-26 RX ORDER — IPRATROPIUM BROMIDE AND ALBUTEROL SULFATE 2.5; .5 MG/3ML; MG/3ML
3 SOLUTION RESPIRATORY (INHALATION)
Status: COMPLETED | OUTPATIENT
Start: 2018-11-26 | End: 2018-11-26

## 2018-11-26 RX ORDER — HEPARIN SODIUM 5000 [USP'U]/ML
4000 INJECTION, SOLUTION INTRAVENOUS; SUBCUTANEOUS ONCE
Status: COMPLETED | OUTPATIENT
Start: 2018-11-26 | End: 2018-11-26

## 2018-11-26 RX ORDER — ACETAMINOPHEN 325 MG/1
650 TABLET ORAL
Status: DISCONTINUED | OUTPATIENT
Start: 2018-11-26 | End: 2018-11-26

## 2018-11-26 RX ORDER — KETOROLAC TROMETHAMINE 30 MG/ML
30 INJECTION, SOLUTION INTRAMUSCULAR; INTRAVENOUS
Status: COMPLETED | OUTPATIENT
Start: 2018-11-26 | End: 2018-11-26

## 2018-11-26 RX ORDER — LIDOCAINE 50 MG/G
OINTMENT TOPICAL AS NEEDED
Status: DISCONTINUED | OUTPATIENT
Start: 2018-11-26 | End: 2018-11-27 | Stop reason: HOSPADM

## 2018-11-26 RX ADMIN — MIDAZOLAM HYDROCHLORIDE 1 MG: 1 INJECTION, SOLUTION INTRAMUSCULAR; INTRAVENOUS at 11:24

## 2018-11-26 RX ADMIN — Medication 10 ML: at 22:26

## 2018-11-26 RX ADMIN — LIDOCAINE HYDROCHLORIDE: 20 JELLY TOPICAL at 11:33

## 2018-11-26 RX ADMIN — KETOROLAC TROMETHAMINE 30 MG: 30 INJECTION, SOLUTION INTRAMUSCULAR at 01:30

## 2018-11-26 RX ADMIN — MIDAZOLAM HYDROCHLORIDE 2 MG: 1 INJECTION, SOLUTION INTRAMUSCULAR; INTRAVENOUS at 11:30

## 2018-11-26 RX ADMIN — FUROSEMIDE 40 MG: 10 INJECTION, SOLUTION INTRAMUSCULAR; INTRAVENOUS at 23:10

## 2018-11-26 RX ADMIN — Medication 10 ML: at 13:27

## 2018-11-26 RX ADMIN — LIDOCAINE 3 EACH: 50 OINTMENT TOPICAL at 11:04

## 2018-11-26 RX ADMIN — DILTIAZEM HYDROCHLORIDE 2.5 MG/HR: 5 INJECTION INTRAVENOUS at 00:18

## 2018-11-26 RX ADMIN — FENTANYL CITRATE 25 MCG: 50 INJECTION, SOLUTION INTRAMUSCULAR; INTRAVENOUS at 11:30

## 2018-11-26 RX ADMIN — CARVEDILOL 3.12 MG: 3.12 TABLET, FILM COATED ORAL at 17:16

## 2018-11-26 RX ADMIN — MIDAZOLAM HYDROCHLORIDE 1 MG: 1 INJECTION, SOLUTION INTRAMUSCULAR; INTRAVENOUS at 11:33

## 2018-11-26 RX ADMIN — HEPARIN SODIUM AND DEXTROSE 11 UNITS/KG/HR: 10000; 5 INJECTION INTRAVENOUS at 09:31

## 2018-11-26 RX ADMIN — HEPARIN SODIUM 2000 UNITS: 5000 INJECTION INTRAVENOUS; SUBCUTANEOUS at 22:49

## 2018-11-26 RX ADMIN — SODIUM CHLORIDE 130 ML/HR: 900 INJECTION, SOLUTION INTRAVENOUS at 00:17

## 2018-11-26 RX ADMIN — ACETAMINOPHEN 650 MG: 325 TABLET ORAL at 07:30

## 2018-11-26 RX ADMIN — MIDAZOLAM HYDROCHLORIDE 1 MG: 1 INJECTION, SOLUTION INTRAMUSCULAR; INTRAVENOUS at 11:26

## 2018-11-26 RX ADMIN — LIDOCAINE: 50 OINTMENT TOPICAL at 11:18

## 2018-11-26 RX ADMIN — CARVEDILOL 3.12 MG: 3.12 TABLET, FILM COATED ORAL at 13:23

## 2018-11-26 RX ADMIN — POTASSIUM CHLORIDE 10 MEQ: 7.46 INJECTION, SOLUTION INTRAVENOUS at 07:53

## 2018-11-26 RX ADMIN — ACETAMINOPHEN 650 MG: 325 TABLET ORAL at 13:23

## 2018-11-26 RX ADMIN — FENTANYL CITRATE 25 MCG: 50 INJECTION, SOLUTION INTRAMUSCULAR; INTRAVENOUS at 11:26

## 2018-11-26 RX ADMIN — DILTIAZEM HYDROCHLORIDE 15 MG/HR: 5 INJECTION INTRAVENOUS at 17:17

## 2018-11-26 RX ADMIN — FENTANYL CITRATE 25 MCG: 50 INJECTION, SOLUTION INTRAMUSCULAR; INTRAVENOUS at 11:22

## 2018-11-26 RX ADMIN — SODIUM CHLORIDE 130 ML/HR: 900 INJECTION, SOLUTION INTRAVENOUS at 07:35

## 2018-11-26 RX ADMIN — DILTIAZEM HYDROCHLORIDE 15 MG/HR: 5 INJECTION INTRAVENOUS at 10:03

## 2018-11-26 RX ADMIN — Medication 10 ML: at 06:00

## 2018-11-26 RX ADMIN — DEXTROSE MONOHYDRATE 150 MG: 5 INJECTION, SOLUTION INTRAVENOUS at 21:56

## 2018-11-26 RX ADMIN — IPRATROPIUM BROMIDE AND ALBUTEROL SULFATE 3 ML: .5; 3 SOLUTION RESPIRATORY (INHALATION) at 21:42

## 2018-11-26 RX ADMIN — PROCHLORPERAZINE EDISYLATE 5 MG: 5 INJECTION INTRAMUSCULAR; INTRAVENOUS at 20:25

## 2018-11-26 RX ADMIN — POTASSIUM CHLORIDE 10 MEQ: 7.46 INJECTION, SOLUTION INTRAVENOUS at 09:25

## 2018-11-26 RX ADMIN — HEPARIN SODIUM 4000 UNITS: 5000 INJECTION INTRAVENOUS; SUBCUTANEOUS at 16:35

## 2018-11-26 RX ADMIN — MIDAZOLAM 2 MG: 1 INJECTION INTRAMUSCULAR; INTRAVENOUS at 11:21

## 2018-11-26 RX ADMIN — ACETAMINOPHEN 500 MG: 500 TABLET ORAL at 18:01

## 2018-11-26 NOTE — ED NOTES
Verbal shift change report given to [address] [place] [address] (oncoming nurse) by Mis Wilson (offgoing nurse). Report included the following information SBAR, Kardex, ED Summary, Procedure Summary and MAR.

## 2018-11-26 NOTE — PROGRESS NOTES
[facility]: MED RECONCILIATION Comments/Recommendations: ? Med rec performed via patient interview. Patient presents frustrated and in pain. ? Patient states \"the only thing I take is flomax\" when interviewing patient regarding prescription medications. Patient states he only takes this PRN when he has trouble urinating. ? Patient denies taking aspirin when asked but states he takes ibuprofen when needed. Information obtained from: patient Significant PMH/Disease States:  
Past Medical History:  
Diagnosis Date  Anxiety  BPH  Depression  ED (erectile dysfunction)  Hearing loss  Herniated disc  Hypogonadism  Sleep apnea Chief Complaint for this Admission: Chief Complaint Patient presents with  Irregular Heart Beat Allergies: Patient has no known allergies. Prior to Admission Medications:  
Prior to Admission Medications Prescriptions Last Dose Informant Patient Reported? Taking?  
ibuprofen (MOTRIN) 200 mg tablet  Self Yes Yes Sig: Take 200 mg by mouth every eight (8) hours as needed for Pain.  
tamsulosin (FLOMAX) 0.4 mg capsule  Self Yes Yes Sig: Take 0.4 mg by mouth daily as needed (trouble urinating). Facility-Administered Medications: None Kallie James PHARMD   Contact: [phone]

## 2018-11-26 NOTE — PROGRESS NOTES
Samira Schmid [address] Office [phone] Fax [phone] Assessment and Plan Richy Loya is a 70 y.o. male admitted for atrial flutter with RVR. He has spent 1 night(s) in the hospital. 
 
24 Hour Events: Pt in atrial flutter overnight with -150s. Placed on Dilt drip at decreased rate of 2.5mg/hr in setting of soft/hypotensive BPs. Atrial flutter with RVR in setting of hypotension: Unclear etiology; possibly 2/2 untreated RAMIREZ. CTA shows small pleural effusions but negative for aortic dissection, PE, or pericardial effusion. TSH wnl and UDS neg. BNP 2639 and CK-MB wnl. S/p failed cardioversion and Corvert x1. Per Cardiology, recommended to start Dilt gtt at 5mg/hr and titrate to HR <100. Slowly titrating Dilt gtt due to soft BPs. U/A shows 15 ketones. Bibasilar crackles on exam. 
 -Cont MIVF at 130 cc/hr, will bolus 500 cc NS as needed while on drip  
-NPO at midnight for any possible procedures 
-Echo pending Chest Pain: Arrhythmia vs GERD. ACS less likely given neg trop x2. Received Fentanyl x1 but now avoiding due to low BPs. Received Toradol x2 and GI cocktail.  
-Continue Toradol as needed BPH 
-Will hold home flomax for now RAMIREZ-May have led to arrhythmia.  
-Not on CPAP at home FEN/GI - NPO after midnight. NS at 130 mL/hr. Activity - Out of bed with assistance DVT prophylaxis - Lovenox GI prophylaxis - Not indicated at this time Fall prophylaxis - Not indicated at this time. Disposition - Admit to ICU. Plan to d/c to Home. Code Status - Full, discussed with patient / caregivers. I appreciate the opportunity to participate in the care of this patient, Gaudencio Murrell MD 
Family Medicine Resident Subjective / Objective Subjective Pt in atrial flutter overnight with -150s. Placed on Dilt drip at decreased rate of 2.5mg/hr in setting of soft/hypotensive BPs.  Received toradol x2 for continued sharp chest pain worse on inspiration. This morning, chest pain improved at 4/10. Has SOB and worsened pain with deep inspiration. Denies, fevers, chills, or n/v. Temp (24hrs), Av.6 °F (37 °C), Min:98 °F (36.7 °C), Max:99.5 °F (37.5 °C) Objective Respiratory: O2 Flow Rate (L/min): 2 l/min O2 Device: Nasal cannula Visit Vitals BP (!) 127/93 Pulse (!) 113 Temp 98.8 °F (37.1 °C) Resp 16 Ht 5' 11\" (1.803 m) Wt 200 lb (90.7 kg) SpO2 94% BMI 27.89 kg/m² General: No acute distress. Alert. Cooperative. Head: Normocephalic. Atraumatic. Eyes:              Conjunctiva pink. Sclera white. PERRL. Ears:              Ear canals patent. TM non-erythematous. Nose:             Septum midline. Mucosa pink. No drainage. Throat: Mucosa pink. Moist mucous membranes. No tonsillar exudates or erythema. Palate movement equal bilaterally. Neck: Supple. Normal ROM. No stiffness. Respiratory: Bibasilar crackles. No wheezing or rhonchi. Cardiovascular: Irregularly irregular. No m/r/g. Pulses 2+ throughout. GI: + bowel sounds. Nontender. No rebound tenderness or guarding. Nondistended. Extremities: No LE edema. Distal pulses intact. Musculoskeletal: Full ROM in all extremities. Skin: Warm, dry. No rashes. Neuro: CN II-XII grossly intact. Strength 5/5 in all extremities. I/O: 
Date 18 - 18 3964 18 - 18 1587 Shift [id number] 2985-4213 24 Hour Total 2565-3299 1937-2538 24 Hour Total  
INTAKE  
I.V.(mL/kg/hr)  1070.3 1070.3 Cardizem Volume  8.3 8.3 Volume (0.9% sodium chloride infusion)  962 962 Volume (ibutilide (CORVERT) 1 mg in 0.9% sodium chloride infusion)  50 50 Volume (magnesium sulfate 2 g/50 ml IVPB (premix or compounded))  50 50 Shift Total(mL/kg)  1070. 3(11.8) 1070. 3(11.8) OUTPUT Urine(mL/kg/hr)  600 600 Urine Voided  600 600 Shift Total(mL/kg)  600(6.6) 600(6.6) NET  470.3 470.3 Weight (kg) 90.7 90.7 90.7 90.7 90.7 90.7 CBC: 
Recent Labs  
  11/26/18 
0343 11/25/18 
1658 WBC 6.1 10.9 HGB 11.8* 14.5 HCT 37.6 45.4 * 189 Metabolic Panel: 
Recent Labs  
  11/26/18 
0343 11/25/18 
1658  139  
K 3.8 4.0  
 104 CO2 23 26 BUN 11 14 CREA 1.04 1.10 * 120* CA 7.7* 9.0 MG  --  2.1 ALB  --  3.7 SGOT  --  26 ALT  --  67 For Billing Chief Complaint Patient presents with  Irregular Heart Beat Hospital Problems  Date Reviewed: 8/8/2018 Codes Class Noted POA Atrial fibrillation with RVR ([facility] [address] ICD-10-CM: I48.91 
ICD-9-CM: 427.31  11/25/2018 Unknown

## 2018-11-26 NOTE — ROUTINE PROCESS
CHARLIE/ CV performed at bedside with cardiac cath lab team and Dr. Stephany Angel at bedside. Shock delivered at 11:34 am 360 joules, unsuccessful. Cardizem gtt restarted.  Report given to China Rodriguez

## 2018-11-26 NOTE — PROCEDURES
CARDIOVERSION PROCEDURE NOTE    Date of Procedure: 11/26/2018   Cardiologist: Fide Springer MD  Anesthesia:  IV concious sedation    Preprocedure Diagnosis:   1. Afib/ Flutter    Postoperative Diagnosis:  Afib/ Flutter    Complications: none  Blood Loss: none  Specimen removed: none      Procedure Note:  After informed consent was obtained, the patient was brought back to the lab in fasting condition. The presenting rhythm was atrial fibrillation. AP and PA defib pads were placed in the appropriate regions on the chest wall. Patient was given Versed and Fentanyl for conscious sedation per nursing personnel. Continuous pulse oximetry and cuff BP monitoring was done. Once appropriately sedated, the patient was given one 360J biphasic synchronized shock that did not convert him to sinus rhythm. The procedure was unsuccessful to convert him to sinus rhythm. The patient was observed until alert and oriented times 3. This Study Demonstrates:  1. Afib/ Flutter    Recommendations:  - EP consult for Aflutter ablation.   - LHC for LV dysfunction      Fred Luther MD, [facility] [place]

## 2018-11-26 NOTE — ROUTINE PROCESS
0700: Bedside and Verbal shift change report given to Eliazar Roach RN (oncoming nurse) by Ted St RN (offgoing nurse). Report included the following information SBAR, Kardex, Procedure Summary, Intake/Output, MAR, Recent Results, Med Rec Status and Cardiac Rhythm Afib . 
 
1900: Bedside and Verbal shift change report given to Debbie RN (oncoming nurse) by Christina Parra RN (offgoing nurse). Report included the following information SBAR, Kardex, Procedure Summary, Intake/Output, MAR, Recent Results, Med Rec Status and Cardiac Rhythm Afib .

## 2018-11-26 NOTE — ED NOTES
Spoke with family practice regarding diltiazem drip. Advised to start drip at 2.5mg/hr  and if BP pressure drops to notify provider.

## 2018-11-26 NOTE — PROGRESS NOTES
Reason for Admission:   Atrial flutter/atrial fibrillation with RVR 
                
RRAT Score:    0 Plan for utilizing home health: Pt qualifies for the hospital to home CHF visit Likelihood of Readmission:  Low to moderate Transition of Care Plan:                  Pt was admitted on 11/25/18 with atrial flutter with RVR. Pt confirmed his address as correct on demographics. The plan today is to attempt another cardioversion today. I discussed pt with cardiology NP to see if pt may need a life vest as he has acute on chronic CHF with an EF of 25%aortic stenosis pt has blue cross,it typically takes several days for insurance preauthorization to be approved. Per cardiology,pt may have a cardiac catherization tomorrow so will follow-up with cardiology tomorrow in regards to discharge needs. Pulmonology is arranging an outpatient sleep study for pt. Hospital to home CHF visit referral has been sent to [facility] through the cc link and placed on the AVS.I notified Dr Denis Way nurse navigators regarding pt.'s admission.  
 
Landen Wall

## 2018-11-26 NOTE — ED NOTES
Family practice notified pain is not improved and pt requesting pain meds, family practice states they are consulting with cardiology

## 2018-11-26 NOTE — PROCEDURES
[address]  *** FINAL REPORT ***    Name: Concetta Whatley  MRN: [id number]    Inpatient  : 10 Nov 1955  HIS Order #: [id number]  [address] [facility] Visit #: [id number]  Date: 2018    TYPE OF TEST: Peripheral Venous Testing    REASON FOR TEST  Suspected pulmonary embolism    Right Leg:-  Deep venous thrombosis:           No  Superficial venous thrombosis:    No  Deep venous insufficiency:        No  Superficial venous insufficiency: No    Left Leg:-  Deep venous thrombosis:           No  Superficial venous thrombosis:    No  Deep venous insufficiency:        No  Superficial venous insufficiency: No      INTERPRETATION/FINDINGS  PROCEDURE:  BILATERAL LE VENOUS DUPLEX. Evaluation of lower extremity veins with ultrasound (B-mode imaging,  pulsed Doppler, color Doppler). Includes the common femoral, deep  femoral, femoral, popliteal, posterior tibial, peroneal, and great  saphenous veins. FINDINGS: Unable to visualize the right posterior tibial veins due to  possible small caliber of the vessel. Gray scale and color flow duplex   images of the veins visualized in both lower extremities demonstrate  normal compressibility, spontaneous and augmented flow profiles, and  absence of filling defects throughout the deep and superficial veins  in both lower extremities. CONCLUSION: Bilateral lower extremity venous duplex negative for deep  venous thrombosis or thrombophlebitis. Prominent lymph nodes noted in  the groin bilaterally, measuring 3.69 x 0.82 cm on the right and 1.83  x 0.52 cm on the left. ADDITIONAL COMMENTS    I have personally reviewed the data relevant to the interpretation of  this  study. TECHNOLOGIST: Yusuf Ron RDCS  Signed: 2018 11:40 AM    PHYSICIAN: Emma Gomez.  Mikala Esquivel MD  Signed: 2018 09:20 AM

## 2018-11-26 NOTE — ED NOTES
Pt states same chest pain has increased again to 8 out of 10, family practice called for pain management, pt repositioned for comfort at this time

## 2018-11-26 NOTE — ED NOTES
ICU charge nurse Mignon Solomon RN called regarding room assignment received, this relayed to Monae Savage primary nurse.

## 2018-11-26 NOTE — ED NOTES
Cardiologist plans to order corvert, cardiologist does not want to give mag, stating mag within normal level.

## 2018-11-26 NOTE — PROGRESS NOTES
0145: TRANSFER - IN REPORT: 
 
Verbal report received from 4569 Simeon Echeverria RN(name) on [facility]  being received from ED(unit) for routine progression of care Report consisted of patients Situation, Background, Assessment and  
Recommendations(SBAR). Information from the following report(s) SBAR, Kardex, Procedure Summary, Intake/Output, MAR, Accordion, Recent Results and Cardiac Rhythm Afib RVR  was reviewed with the receiving nurse. Opportunity for questions and clarification was provided. Assessment completed upon patients arrival to unit and care assumed. Patient AxOx4, on 2 liters O2, Afib on the monitor HR 140s, Patient oriented to unit, call bell within reach. 0200: Spoke with Dr. Janeen Whitaker regarding patient HR remaining in the 130's-140s, order to increase Cardizem to 7.5. Will continue to monitor. 0305: Patients HR remains in the 120s-130s. . Dr. Janeen Whitaker notified. Will titrate Cardizem to max of 15mg and if HR is not stable will contact Cardiology. 0315: Cardizem titrated, see MAR 
0412: Cardizem titrated, see MAR  
 
0530: Patients HR in the 110s, Cardizem infusing at 12.5, will continue to monitor. 0700: Bedside and Verbal shift change report given to Lesvia Hernandez RN (oncoming nurse) by Anushka Zarate RN (offgoing nurse). Report included the following information SBAR, Kardex, Procedure Summary, Intake/Output, MAR, Recent Results, Med Rec Status and Cardiac Rhythm Afib .

## 2018-11-26 NOTE — PROGRESS NOTES
NPO after 7 AM tomorrow  
-can have an early breakfast. 
D/w [facility] RN

## 2018-11-26 NOTE — PROGRESS NOTES
Cardiology Progress Note [address] [facility], [place], [address] Phone [phone]; Fax [phone] 
 
 
 
2018 9:18 AM  
 
Admit Date:           2018 Admit Diagnosis:  Atrial fibrillation with RVR ([address] :          1955 MRN:          [id number] ASSESSMENT/RECOMMENDATION:  
Atrial fibrillation/flutter with rapid ventricular response: Unsuccessful cardioversion with low dose of 100 J. Could not repeat cardioversion because unable to sedate him due to marginal blood pressure. - d/t new low LVEF will start heparin gtt and plan for CHARLIE prior to [facility] 
-TSH WNL 
-[id number]=3 will need long term [facility] 
-Lion mancini Acute on chronic CHF NYHA class III, new onset: EF 25% per echo.  
-starting heparin  
-will need ischemic evaluation at some point, possible cath tomorrow? 
 -no GDMT right now d/t hypotension 
-decrease IVF rate Chest pain: The pleuritic chest pain is of unclear etiology, suspect this is pericarditis. Would avoid NSAIDs in the setting of acute CHF/starting OAC. Can use Toradol.  
-chest CTA negative for aortic aneurysm or dissection. - check LE dopplers for DVT Sleep apnea: Currently does not use CPAP for last many years. Need to encourage him to start using his CPAP again. 
-follow up with sleep clinic OP Cardiology Attending: 
 
Patient personally seen and examined. All the elements of history and examination were personally performed. Assessment and plan was discussed and agree as written above. - CHARLIE showed no DEVENDRA thrombus. - DCCV with 360 J was not successful. 
- Discussed with Dr. Kyleigh Barker. He is going to do Aflutter ablation Thursday. - I will do cath tomorrow to r/o CAD 
- Continue Heparin gtt until Ablation and then switch to NOAC.   
- Rate control.  
- CPAP renewal as OP 
 - Discussed with him and his girlfriend. Fred James MD, [facility] [place] No intake/output data recorded. Last 3 Recorded Weights in this Encounter 11/25/18 1645 Weight: 200 lb (90.7 kg) 11/24 1901 - 11/26 0700 In: 1200.3 [I.V.:1200.3] Out: 600 [Urine:600] SUBJECTIVE Manav Reading III + CP at rest 5/10, CP has improved since yesterday. CP exacerbated by taking deep breaths. Breathing okay. Current Facility-Administered Medications Medication Dose Route Frequency  acetaminophen (TYLENOL) tablet 650 mg  650 mg Oral Q6H PRN  
 heparin 25,000 units in D5W 250 ml infusion  11-25 Units/kg/hr IntraVENous TITRATE  sodium chloride (NS) flush 5-10 mL  5-10 mL IntraVENous Q8H  
 sodium chloride (NS) flush 5-10 mL  5-10 mL IntraVENous PRN  
 sodium chloride (NS) flush 5-10 mL  5-10 mL IntraVENous Q8H  
 sodium chloride (NS) flush 5-10 mL  5-10 mL IntraVENous PRN  
 dilTIAZem (CARDIZEM) 125 mg in dextrose 5% 125 mL infusion  15 mg/hr IntraVENous TITRATE  
 0.9% sodium chloride infusion  130 mL/hr IntraVENous CONTINUOUS OBJECTIVE Intake/Output Summary (Last 24 hours) at 11/26/2018 9600 Last data filed at 11/26/2018 0600 Gross per 24 hour Intake 1200.33 ml Output 600 ml Net 600.33 ml Review of Systems - History obtained from the patient AS PER  HPI Telemetry Afib 110's. PHYSICAL EXAM  
  
 
Visit Vitals /84 Pulse (!) 114 Temp 98.8 °F (37.1 °C) Resp 25 Ht 5' 11\" (1.803 m) Wt 200 lb (90.7 kg) SpO2 93% BMI 27.89 kg/m² Gen: Well-developed, well-nourished, in no acute distress  alert and oriented x 3 HEENT:  Pink conjunctivae, Hearing grossly normal.No scleral icterus or conjunctival, moist mucous membranes Neck: Supple,No JVD Resp: No accessory muscle use, Clear breath sounds, No rales or rhonchi 
Card: Irregular/accelerated Rate,Rythm,No murmurs, rubs or gallop. No thrills. GI:          soft, non-tender MSK: No cyanosis or clubbing, good capillary refill Skin: No rashes or ulcers, no bruising Neuro:  Cranial nerves are grossly intact, moving all four extremities, no focal deficit, follows commands appropriately Psych:  Good insight, oriented to person, place and time, alert, Nml Affect LE: No edema DATA REVIEW Laboratory and Imaging have been reviewed by me and are notable for Recent Labs  
  11/26/18 0343 11/25/18 
2224 CPK  --  39  
CKMB  --  <1.0  
TROIQ <0.05 <0.05 Recent Labs  
  11/26/18 
0343 11/25/18 
1658  139  
K 3.8 4.0  
CO2 23 26 BUN 11 14 CREA 1.04 1.10 * 120* MG 2.4 2.1 WBC 6.1 10.9 HGB 11.8* 14.5 HCT 37.6 45.4 * 189 Nora Londono, NP

## 2018-11-26 NOTE — ED NOTES
Spoke with [facility] regarding pt's orders and level of care, reiterated frequent runs of vtach and ICU level of care recommended. Dr. Concetta Pacheco currently discussing pt case with attending. Will await orders.

## 2018-11-26 NOTE — PROGRESS NOTES
Shift Summary 0700 Bedside report with off going RN Patient awake in bed complaining of continued chest pain 5/10 Will notify MD during rounds, CP did not improve overnight  
 
0730 Assessment. Patient complainds of HA 10/10 Reported this and CP to MD Nii Chavez. Orders for tylenol. 6939 MD Polanco at bedside to assess. Arbor[employer]Peoples Made MD Neri aware of consult. Confirmed cards consult. Yesika Pugh NP reports cardioversion at 11AM  
 
0900 Echo tech at bedside to assess. [address] at bedside to assess. 1145 Cardioversion in room, not in lab. Un successful. Wife brought back in updated on plan of care. Will cont to monitor. 1331 medicated patient for HA with tylenol. Educated patient on new medication coreg. Patient tolerated lunch well. Will cont to monitor. 1530 Scheduled lab work  
 
1600 Reassessment. Patient sleeping. Arouses to voice. No other needs voiced at this time. 1715 Scheduled meds. Patient complains of headache. Reported to MD Nii Chavez who orders tylenol. Administered per order. Will continue to monitor. 1800 Rounds. Patient eating grilled cheese. No other needs voiced at this time. 1911 Scheduled meds. Family at bedside. Educated patient on new medication amiodarone. No other needs voiced at this time 1920 Called to patient's room for vomiting. Assisted patient with emesis bag and wet washcloth. Reported to [facility] MD. Reported amio dose may not have been effectively administered. May need another dose.

## 2018-11-26 NOTE — CONSULTS
[facility]     Name: Waldemar Florez MRN: [id number]   : 1955 Hospital: [address]   Date: 2018        Impression Plan   1. Afib/flutter with RVR  2. Acute hypoxic respiratory failure  3. RAMIREZ, untreated  4. Systolic heart failure               · Cardiology following  · Planned for repeat cardioversion today  · Dilt drip  · Heparin drip  · Possible cardiac cath prior to dsicharge  · Will set up with sleep eval as an outpatient when he is ready for discharge  · NPO for procedure    If he is stable following cardioversion, could potentially transfer to stepdown       Radiology  ( personally reviewed) CTA chest: no PE, aortic aneurysm or dissection, lungs clear, small pleural effusion  CXR without edema, airspace disease   ABG No results for input(s): PHI, PO2I, PCO2I in the last 72 hours. Subjective     This patient has been seen and evaluated at the request of Dr. Augustine Malin for RAMIREZ, hypotension. Patient is a 70 y.o. male with a history of RAMIREZ who presented initially with pleuritic chest pain. Found to be in afib/aflutter with RVR, failed cardioversions x1 and repeat not done due to marginal blood pressures and concern he would not tolerate additional sedation. New onset heart failure, EF 25%. Still with pleuritic chest pain at times, but improved compared to yesterday. Received Toradol with some improvement, but avoid further NSAIDS in the setting of acute heart failure. Denies shortness of breath, f/c, edema. Tells me that he was diagnosed with RAMIREZ many years ago, wore a CPAP and loved it but it broke over 5 years ago. His sleep study was lost and he could not get another without a repeat sleep study, which he did not do as he was frustrated with all the changes in his insurance around that period of time. He is interested in being evaluated again. Review of Systems:  Review of systems not obtained due to patient factors.     Past Medical History: Diagnosis Date    Anxiety     BPH     Depression     ED (erectile dysfunction)     Hearing loss     Herniated disc     Hypogonadism     Sleep apnea       Past Surgical History:   Procedure Laterality Date    ECHO STRESS      HX HERNIA REPAIR      HX ORTHOPAEDIC      knee surgery      Prior to Admission medications    Medication Sig Start Date End Date Taking? Authorizing Provider   tamsulosin (FLOMAX) 0.4 mg capsule Take 0.4 mg by mouth daily as needed (trouble urinating). Yes Provider, Historical   ibuprofen (MOTRIN) 200 mg tablet Take 200 mg by mouth every eight (8) hours as needed for Pain. Yes Provider, Historical     Current Facility-Administered Medications   Medication Dose Route Frequency    potassium chloride 10 mEq in 100 ml IVPB  10 mEq IntraVENous Q1H    heparin 25,000 units in D5W 250 ml infusion  11-25 Units/kg/hr IntraVENous TITRATE    sodium chloride (NS) flush 5-10 mL  5-10 mL IntraVENous Q8H    sodium chloride (NS) flush 5-10 mL  5-10 mL IntraVENous Q8H    dilTIAZem (CARDIZEM) 125 mg in dextrose 5% 125 mL infusion  15 mg/hr IntraVENous TITRATE    0.9% sodium chloride infusion  75 mL/hr IntraVENous CONTINUOUS     No Known Allergies   Social History     Tobacco Use    Smoking status: Former Smoker    Smokeless tobacco: Never Used   Substance Use Topics    Alcohol use: Yes     Alcohol/week: 3.0 oz     Types: 6 Cans of beer per week     Comment: social      Family History   Problem Relation Age of Onset    Cancer Mother     Diabetes Father     Hypertension Father     Dementia Father     Cancer Sister           Laboratory: I have personally reviewed the critical care flowsheet and labs.      Recent Labs     11/26/18  0343 11/25/18  1658   WBC 6.1 10.9   HGB 11.8* 14.5   HCT 37.6 45.4   * 189     Recent Labs     11/26/18  0343 11/25/18  1658    139   K 3.8 4.0    104   CO2 23 26   * 120*   BUN 11 14   CREA 1.04 1.10   CA 7.7* 9.0   MG 2.4 2.1   ALB --  3.7   SGOT  --  26   ALT  --  67       Objective:     Mode Rate Tidal Volume Pressure FiO2 PEEP                    Vital Signs:     TMAX(24)      Intake/Output:   Last shift:         Last 3 shifts: No intake/output data recorded. RRIOLAST3    Intake/Output Summary (Last 24 hours) at 11/26/2018 0942  Last data filed at 11/26/2018 0600  Gross per 24 hour   Intake 1200.33 ml   Output 600 ml   Net 600.33 ml     EXAM:   GENERAL: well developed and in no distress, HEENT:  PERRL, EOMI, no alar flaring or epistaxis, oral mucosa moist without cyanosis, NECK:  no jugular vein distention, no retractions, no thyromegaly or masses, LUNGS: CTAB, HEART:  Irregularly irregular, with no MGR; no edema is present, ABDOMEN:  soft with no tenderness, bowel sounds present, EXTREMITIES:  warm with no cyanosis, SKIN:  no jaundice or ecchymosis and NEUROLOGIC:  alert and oriented, grossly non-focal    Naomie MD [facility]  [facility] [place]

## 2018-11-26 NOTE — H&P
Samira Schmid [address]  Office [phone] Fax [phone] Admission H&P Name: Kaylyn Fothergill MRN: [id number]  Sex: Male YOB: 1955  Age: 70 y.o. PCP: Crispin Cash DO Source of Information: patient, medical records Chief Complaint: Chest Pain History of Present Illness Kaylyn Fothergill is a 70 y.o. male with PMH of BPH, RAMIREZ non compliant with CPAP who presents to the ER complaining of chest pain. Patient states it began last night and continued into this morning, worsening around 4am, patient initially just thought it was heartburn. Patient then went to an urgent care center, where they stated he was showing a tachyarrhythmia on EKG, and he was sent to [facility] ED. Patient denies smoking, drinks 1-2 beers a week, and denies illicit drug use. No Hx of thyroid issues. Does not use a CPAP for his sleep apnea. No other symptoms of lightheadedness, dizziness, numbness/tingling, or heart racing. Does complain of intermittent shortness of breath. Describes chest pain as sharp and mid sternal and it comes in waves, hurst to breathe in deep. Past Medical History:  
Diagnosis Date  Anxiety  BPH  Depression  ED (erectile dysfunction)  Hearing loss  Herniated disc  Hypogonadism  Sleep apnea Patient Vitals for the past 12 hrs: 
 Temp Pulse Resp BP SpO2  
11/25/18 2045  (!) 115 20 113/73 97 % 11/25/18 2030  (!) 152 (!) 63 (!) 133/97 (!) 84 % 11/25/18 2000  (!) 131 23 (!) 115/104 90 % 11/25/18 1945  (!) 106 24 120/90 (!) 85 % 11/25/18 1935  98 19 99/78 95 % 11/25/18 1931  (!) 127 21 104/76 90 % 11/25/18 1925  (!) 119 23 (!) 130/107 94 % 11/25/18 1910  78 29 103/88 93 % 11/25/18 1900  (!) 123 20 95/66 96 % 11/25/18 1856  (!) 140 24 (!) 109/91 95 % 11/25/18 1851  (!) 153 (!) 32  94 % 11/25/18 1850  (!) 109 (!) 34 106/81   
11/25/18 1845  (!) 139 23 (!) 82/71 95 % 11/25/18 1835  (!) 102 22 103/77 96 % 11/25/18 1830  (!) 144 25 105/74 90 % 11/25/18 1825  (!) 130 21 (!) 121/97 97 % 11/25/18 1800  (!) 143 23 (!) 113/91 98 % 11/25/18 1749  (!) 142 24 111/88 93 % 11/25/18 1745  (!) 140 28 111/88 91 % 11/25/18 1737  (!) 134 21 102/79 91 % 11/25/18 1736  (!) 127 20 (!) 85/64 92 % 11/25/18 1732  (!) 144 26 (!) 72/57 (!) 85 % 11/25/18 1730  (!) 126 22 (!) 81/58 93 % 11/25/18 1716 99.5 °F (37.5 °C) (!) 135 18 96/85 94 % 11/25/18 1705     96 % 11/25/18 1645 98 °F (36.7 °C) (!) 178 23 105/76 94 % In the ER, 
- Vitals - Blood pressure 113/73, pulse (!) 115, temperature 99.5 °F (37.5 °C), resp. rate 20, height 5' 11\" (1.803 m), weight 200 lb (90.7 kg), SpO2 97 %. - Labs - Trop Neg, CBC/BMP WNL 
- Imaging - CTA negative for dissection and PE, no evidence of pericardial effusion - Treatment - Cardioversion x1 (failed to convert), Corvert x 1 Home Medications Prior to Admission medications Medication Sig Start Date End Date Taking? Authorizing Provider  
tamsulosin (FLOMAX) 0.4 mg capsule Take 0.4 mg by mouth daily as needed (trouble urinating). Yes Provider, Historical  
ibuprofen (MOTRIN) 200 mg tablet Take 200 mg by mouth every eight (8) hours as needed for Pain. Yes Provider, Historical  
 
 
Allergies No Known Allergies Past Medical History:  
Diagnosis Date  Anxiety  BPH  Depression  ED (erectile dysfunction)  Hearing loss  Herniated disc  Hypogonadism  Sleep apnea Previous Hospitalization(s) None Past Surgical History:  
Procedure Laterality Date  ECHO STRESS    
 HX HERNIA REPAIR    
 HX ORTHOPAEDIC    
 knee surgery Family History Problem Relation Age of Onset  Cancer Mother  Diabetes Father  Hypertension Father  Dementia Father  Cancer Sister Social History Social History Socioeconomic History  Marital status: LIFE PARTNER  
 Spouse name: Not on file  Number of children: Not on file  Years of education: Not on file  Highest education level: Not on file Social Needs  Financial resource strain: Not on file  Food insecurity - worry: Not on file  Food insecurity - inability: Not on file  Transportation needs - medical: Not on file  Transportation needs - non-medical: Not on file Occupational History  Not on file Tobacco Use  Smoking status: Former Smoker  Smokeless tobacco: Never Used Substance and Sexual Activity  Alcohol use: Yes Alcohol/week: 3.0 oz Types: 6 Cans of beer per week Comment: social  
 Drug use: No  
 Sexual activity: Yes Other Topics Concern  Not on file Social History Narrative  Not on file Alcohol history: 1-2 beers a week Smoking history: Non-smoker Illicit drug history: Not at all Living arrangement: patient lives with their family. Ambulates: Independently Review of Systems Review of Systems Constitutional: Positive for fatigue. Respiratory: Positive for cough, chest tightness and shortness of breath. Negative for wheezing. Cardiovascular: Positive for chest pain. Negative for palpitations and leg swelling. Gastrointestinal: Negative for abdominal pain, constipation and diarrhea. Genitourinary: Positive for urgency. Negative for dysuria. Musculoskeletal: Positive for neck pain. Neurological: Negative for dizziness, light-headedness and numbness. All other systems reviewed and are negative. Physical Exam 
Objective O2 Flow Rate (L/min): 2 l/min O2 Device: Nasal cannula Constitutional: Well groomed, no acute distress HEENT:  Atraumatic,normocephalic, PERRLA 
CV:   Irregularly irregular, tachycardic, no murmurs, rubs or thrills. LE edema:None present Resp.: Lung fields clear to auscultation bilaterally, no wheezes, rales or rhonchi Abd:   Bowel sounds are normoactive . No TTP in all four quadrants :  Deferred Musculoskeletal:  Movement in all extremities Lymphatic:  No evidence of LAD Skin:   No evidence of rashes or lesions Neuro: No gross motor or sensory deficits Psych:  AAOx3. Mood and affect appropriate Laboratory Data Recent Results (from the past 8 hour(s)) EKG, 12 LEAD, INITIAL Collection Time: 11/25/18  4:54 PM  
Result Value Ref Range Ventricular Rate 164 BPM  
 Atrial Rate 394 BPM  
 QRS Duration 72 ms Q-T Interval 304 ms QTC Calculation (Bezet) 502 ms Calculated R Axis 38 degrees Calculated T Axis 62 degrees Diagnosis Atrial flutter with variable AV block T wave abnormality, consider lateral ischemia Abnormal ECG When compared with ECG of 15-APR-2009 09:35, Atrial flutter has replaced Sinus rhythm Vent. rate has increased BY  86 BPM 
ST now depressed in Lateral leads Nonspecific T wave abnormality now evident in Inferior leads T wave inversion now evident in Lateral leads CBC WITH AUTOMATED DIFF Collection Time: 11/25/18  4:58 PM  
Result Value Ref Range WBC 10.9 4.1 - 11.1 K/uL  
 RBC 5.33 4.10 - 5.70 M/uL  
 HGB 14.5 12.1 - 17.0 g/dL HCT 45.4 36.6 - 50.3 % MCV 85.2 80.0 - 99.0 FL  
 MCH 27.2 26.0 - 34.0 PG  
 MCHC 31.9 30.0 - 36.5 g/dL  
 RDW 13.9 11.5 - 14.5 % PLATELET 968 348 - 426 K/uL MPV 10.3 8.9 - 12.9 FL  
 NRBC 0.0 0  WBC ABSOLUTE NRBC 0.00 0.00 - 0.01 K/uL NEUTROPHILS 75 32 - 75 % LYMPHOCYTES 12 12 - 49 % MONOCYTES 12 5 - 13 % EOSINOPHILS 0 0 - 7 % BASOPHILS 0 0 - 1 % IMMATURE GRANULOCYTES 0 0.0 - 0.5 % ABS. NEUTROPHILS 8.2 (H) 1.8 - 8.0 K/UL  
 ABS. LYMPHOCYTES 1.3 0.8 - 3.5 K/UL  
 ABS. MONOCYTES 1.3 (H) 0.0 - 1.0 K/UL  
 ABS. EOSINOPHILS 0.0 0.0 - 0.4 K/UL  
 ABS. BASOPHILS 0.0 0.0 - 0.1 K/UL  
 ABS. IMM. GRANS. 0.0 0.00 - 0.04 K/UL  
 DF AUTOMATED METABOLIC PANEL, COMPREHENSIVE Collection Time: 11/25/18  4:58 PM  
Result Value Ref Range  Sodium 139 136 - 145 mmol/L  
 Potassium 4.0 3.5 - 5.1 mmol/L Chloride 104 97 - 108 mmol/L  
 CO2 26 21 - 32 mmol/L Anion gap 9 5 - 15 mmol/L Glucose 120 (H) 65 - 100 mg/dL BUN 14 6 - 20 MG/DL Creatinine 1.10 0.70 - 1.30 MG/DL  
 BUN/Creatinine ratio 13 12 - 20 GFR est AA >60 >60 ml/min/1.73m2 GFR est non-AA >60 >60 ml/min/1.73m2 Calcium 9.0 8.5 - 10.1 MG/DL Bilirubin, total 1.1 (H) 0.2 - 1.0 MG/DL  
 ALT (SGPT) 67 12 - 78 U/L  
 AST (SGOT) 26 15 - 37 U/L Alk. phosphatase 54 45 - 117 U/L Protein, total 7.2 6.4 - 8.2 g/dL Albumin 3.7 3.5 - 5.0 g/dL Globulin 3.5 2.0 - 4.0 g/dL A-G Ratio 1.1 1.1 - 2.2 MAGNESIUM Collection Time: 11/25/18  4:58 PM  
Result Value Ref Range Magnesium 2.1 1.6 - 2.4 mg/dL SAMPLES BEING HELD Collection Time: 11/25/18  4:58 PM  
Result Value Ref Range SAMPLES BEING HELD SST.JON.RED   
 COMMENT Add-on orders for these samples will be processed based on acceptable specimen integrity and analyte stability, which may vary by analyte. TSH 3RD GENERATION Collection Time: 11/25/18  4:58 PM  
Result Value Ref Range TSH 1.74 0.36 - 3.74 uIU/mL  
POC TROPONIN-I Collection Time: 11/25/18  4:59 PM  
Result Value Ref Range Troponin-I (POC) <0.04 0.00 - 0.08 ng/mL Imaging CXR Results  (Last 48 hours)  
          
 11/25/18 1743  XR CHEST PORT Final result Impression:  IMPRESSION: No Acute Disease. Narrative:  EXAM: Portable CXR. 1741 hours INDICATION: dyspnea FINDINGS:  
The lungs are clear. Heart is normal in size. There is no overt pulmonary edema. There is no evident pneumothorax, adenopathy or pleural effusion. CT Results  (Last 48 hours)  
          
 11/25/18 1821  CTA CHEST ABD PELV W CONT Final result Impression:  IMPRESSION:  
1. No aortic aneurysm or dissection. 2. Small pleural effusions. 3. No acute finding in the abdomen/pelvis.   
  
 Narrative:  INDICATION:  Chest pain  CP   
   
 EXAM: CT Angio Chest:  
   
TECHNIQUE: Unenhanced localizing CT imaging of the aorta is followed by bolus  
injection of 100 mL Isovue 370 contrast IV, with thin section axial Chest  
Abdomen Pelvis CT obtained and 3D image post processing performed including  
coronal MIPS. CT dose reduction was achieved through use of a standardized  
protocol tailored for this examination and automatic exposure control for dose  
modulation. FINDINGS:  
   
Abdominal aorta is atherosclerotic without dissection, aneurysm or stenosis. Iliofemoral arteries show no acute finding. Brachiocephalic artery origins are  
normal. Celiac artery, SMA, RYANN and bilateral solitary renal arteries are patent  
and nonstenotic. Lungs are clear. There are trace pleural effusions. There is no pneumothorax. Abdominal organs are unremarkable during arterial phase imaging. Kidneys show no  
mass, stone or hydronephrosis. There is no ascites, pneumoperitoneum or  
significant adenopathy. There are colonic diverticula without inflammation. Bladder is unremarkable. Appendix is normal.  
   
  
  
 
 
 
EKG: Atrial flutter with variable AV block, HR of 164 bpm, no ST elevation. Assessment and Plan Carlos Chiu is a 70 y.o. male with PMH of RAMIREZ and BPH who is admitted for atrial flutter with RVR in setting of hypotension. Based on inability to control heart rate and hypotension, patient will be admitted to ICU 
 
 
-  
ACUTE: 
 
Atrial flutter with RVR in setting of hypotension Unclear origin, could possibly be due to untreated RAMIREZ. Cardiology consulted. Discussed patient with Dr. Aspen Aguila, he was present in ED for cardioversion which failed, s/p Corvert x1 
-Cardiology recs:  Started diltiazem drip at 5mg/hr, titrate to HR <100. CTA negative for aortic dissection and PE, no evidence of pericardial effusion. TSH WNL, UDS WNL 
-Cont MIVF @130 cc/hr, will bolus 500 cc NS as needed while on drip 
- UA pending -CK-MB, pro-BNP pending 
-NPO @ [facility], for any possible procedures 
-Echo pending Chest Pain Differential includes pain from the arrhthymia vs GERD. Received Fentanyl x1, which helped, but have to avoid due to BPs 
-Ordered toradol and GI cocktail 
-Trops Neg x1. Trend trops CHRONIC: 
 
BPH 
-Will hold home flomax for now RAMIREZ 
-Unsure of need for CPAP, given lack of records, may have led to arrhthymia FEN/GI - NPO after midnight. NS at 130 mL/hr. Activity - Out of bed with assistance DVT prophylaxis - Lovenox GI prophylaxis - Not indicated at this time Fall prophylaxis - Not indicated at this time. Disposition - Admit to ICU. Plan to d/c to Home. Code Status - Full, discussed with patient / caregivers. Patient Quintin Caba III  discussed with Dr. Edward Farrar. Patient also seen by Dr. Shahriar Brooks in person in ED and evaluated and discussed further 
 
9:02 PM, 11/25/18 Shannon Castelan MD 
Family Medicine Resident For Billing Chief Complaint Patient presents with  Irregular Heart Beat Hospital Problems  Date Reviewed: 8/8/2018 Codes Class Noted POA Atrial fibrillation with RVR ([facility] [address] ICD-10-CM: I48.91 
ICD-9-CM: 427.31  11/25/2018 Unknown

## 2018-11-26 NOTE — ED NOTES
TRANSFER - OUT REPORT: 
 
Verbal report given to Amos(name) on Quintin Small III  being transferred to ICU(unit) for routine progression of care Report consisted of patients Situation, Background, Assessment and  
Recommendations(SBAR). Information from the following report(s) SBAR, ED Summary and MAR was reviewed with the receiving nurse. Lines:  
Peripheral IV 11/25/18 Antecubital (Active) Site Assessment Clean, dry, & intact 11/25/2018  4:55 PM  
Phlebitis Assessment 0 11/25/2018  4:55 PM  
Infiltration Assessment 0 11/25/2018  4:55 PM  
Dressing Status Clean, dry, & intact 11/25/2018  4:55 PM  
Hub Color/Line Status Pink 11/25/2018  4:55 PM  
Action Taken Blood drawn 11/25/2018  4:55 PM  
Alcohol Cap Used Yes 11/25/2018  4:55 PM  
   
Peripheral IV 11/25/18 Left Forearm (Active) Site Assessment Clean, dry, & intact 11/25/2018  4:57 PM  
Phlebitis Assessment 0 11/25/2018  4:57 PM  
Infiltration Assessment 0 11/25/2018  4:57 PM  
Dressing Status Clean, dry, & intact 11/25/2018  4:57 PM  
Hub Color/Line Status Pink 11/25/2018  4:57 PM  
Alcohol Cap Used Yes 11/25/2018  4:57 PM  
  
 
Opportunity for questions and clarification was provided. Patient transported with: 
 Registered Nurse

## 2018-11-26 NOTE — ED NOTES
Family practice called regarding pt low bp and starting the dilt drip with concern of the med lowering the pressure too much. Family practice to consult with cards and then advise for treatment.

## 2018-11-26 NOTE — ED NOTES
Pt states he feels much better, pharmacy called regarding gi cocktail and dilt drip and stated we should have it soon

## 2018-11-27 ENCOUNTER — APPOINTMENT (OUTPATIENT)
Dept: CT IMAGING | Age: 63
DRG: 286 | End: 2018-11-27
Attending: STUDENT IN AN ORGANIZED HEALTH CARE EDUCATION/TRAINING PROGRAM
Payer: COMMERCIAL

## 2018-11-27 LAB
ANION GAP SERPL CALC-SCNC: 12 MMOL/L (ref 5–15)
APTT PPP: 39.6 SEC (ref 22.1–32)
APTT PPP: 55.2 SEC (ref 22.1–32)
BUN SERPL-MCNC: 12 MG/DL (ref 6–20)
BUN/CREAT SERPL: 11 (ref 12–20)
CALCIUM SERPL-MCNC: 8.3 MG/DL (ref 8.5–10.1)
CHLORIDE SERPL-SCNC: 102 MMOL/L (ref 97–108)
CO2 SERPL-SCNC: 24 MMOL/L (ref 21–32)
CREAT SERPL-MCNC: 1.09 MG/DL (ref 0.7–1.3)
ERYTHROCYTE [DISTWIDTH] IN BLOOD BY AUTOMATED COUNT: 13.6 % (ref 11.5–14.5)
GLUCOSE SERPL-MCNC: 136 MG/DL (ref 65–100)
HCT VFR BLD AUTO: 43.4 % (ref 36.6–50.3)
HGB BLD-MCNC: 13.8 G/DL (ref 12.1–17)
MAGNESIUM SERPL-MCNC: 2.1 MG/DL (ref 1.6–2.4)
MCH RBC QN AUTO: 26.8 PG (ref 26–34)
MCHC RBC AUTO-ENTMCNC: 31.8 G/DL (ref 30–36.5)
MCV RBC AUTO: 84.3 FL (ref 80–99)
NRBC # BLD: 0 K/UL (ref 0–0.01)
NRBC BLD-RTO: 0 PER 100 WBC
PLATELET # BLD AUTO: 175 K/UL (ref 150–400)
PMV BLD AUTO: 10.6 FL (ref 8.9–12.9)
POTASSIUM SERPL-SCNC: 4.5 MMOL/L (ref 3.5–5.1)
RBC # BLD AUTO: 5.15 M/UL (ref 4.1–5.7)
SODIUM SERPL-SCNC: 138 MMOL/L (ref 136–145)
THERAPEUTIC RANGE,PTTT: ABNORMAL SECS (ref 58–77)
THERAPEUTIC RANGE,PTTT: ABNORMAL SECS (ref 58–77)
WBC # BLD AUTO: 12.6 K/UL (ref 4.1–11.1)

## 2018-11-27 PROCEDURE — 5A09357 ASSISTANCE WITH RESPIRATORY VENTILATION, LESS THAN 24 CONSECUTIVE HOURS, CONTINUOUS POSITIVE AIRWAY PRESSURE: ICD-10-PCS | Performed by: INTERNAL MEDICINE

## 2018-11-27 PROCEDURE — 77030008543 HC TBNG MON PRSS MRTM -A

## 2018-11-27 PROCEDURE — 77010033678 HC OXYGEN DAILY

## 2018-11-27 PROCEDURE — 94660 CPAP INITIATION&MGMT: CPT

## 2018-11-27 PROCEDURE — 77030004532 HC CATH ANGI DX IMP BSC -A

## 2018-11-27 PROCEDURE — 85730 THROMBOPLASTIN TIME PARTIAL: CPT

## 2018-11-27 PROCEDURE — 74011636320 HC RX REV CODE- 636/320: Performed by: INTERNAL MEDICINE

## 2018-11-27 PROCEDURE — 36415 COLL VENOUS BLD VENIPUNCTURE: CPT

## 2018-11-27 PROCEDURE — 4A023N7 MEASUREMENT OF CARDIAC SAMPLING AND PRESSURE, LEFT HEART, PERCUTANEOUS APPROACH: ICD-10-PCS | Performed by: INTERNAL MEDICINE

## 2018-11-27 PROCEDURE — B2111ZZ FLUOROSCOPY OF MULTIPLE CORONARY ARTERIES USING LOW OSMOLAR CONTRAST: ICD-10-PCS | Performed by: INTERNAL MEDICINE

## 2018-11-27 PROCEDURE — 74011250637 HC RX REV CODE- 250/637: Performed by: INTERNAL MEDICINE

## 2018-11-27 PROCEDURE — C1894 INTRO/SHEATH, NON-LASER: HCPCS

## 2018-11-27 PROCEDURE — C1769 GUIDE WIRE: HCPCS

## 2018-11-27 PROCEDURE — 74011000258 HC RX REV CODE- 258: Performed by: FAMILY MEDICINE

## 2018-11-27 PROCEDURE — 77030019569 HC BND COMPR RAD TERU -B

## 2018-11-27 PROCEDURE — 85027 COMPLETE CBC AUTOMATED: CPT

## 2018-11-27 PROCEDURE — 65660000000 HC RM CCU STEPDOWN

## 2018-11-27 PROCEDURE — 74011250636 HC RX REV CODE- 250/636: Performed by: FAMILY MEDICINE

## 2018-11-27 PROCEDURE — 74011000250 HC RX REV CODE- 250: Performed by: FAMILY MEDICINE

## 2018-11-27 PROCEDURE — 80048 BASIC METABOLIC PNL TOTAL CA: CPT

## 2018-11-27 PROCEDURE — 74011000250 HC RX REV CODE- 250: Performed by: INTERNAL MEDICINE

## 2018-11-27 PROCEDURE — 83735 ASSAY OF MAGNESIUM: CPT

## 2018-11-27 PROCEDURE — 74011250637 HC RX REV CODE- 250/637: Performed by: NURSE PRACTITIONER

## 2018-11-27 PROCEDURE — 93458 L HRT ARTERY/VENTRICLE ANGIO: CPT

## 2018-11-27 PROCEDURE — 74011250636 HC RX REV CODE- 250/636: Performed by: NURSE PRACTITIONER

## 2018-11-27 PROCEDURE — 74011250637 HC RX REV CODE- 250/637: Performed by: STUDENT IN AN ORGANIZED HEALTH CARE EDUCATION/TRAINING PROGRAM

## 2018-11-27 PROCEDURE — 74011250636 HC RX REV CODE- 250/636: Performed by: INTERNAL MEDICINE

## 2018-11-27 RX ORDER — HEPARIN SODIUM 200 [USP'U]/100ML
500 INJECTION, SOLUTION INTRAVENOUS ONCE
Status: COMPLETED | OUTPATIENT
Start: 2018-11-27 | End: 2018-11-27

## 2018-11-27 RX ORDER — HEPARIN SODIUM 5000 [USP'U]/ML
4000 INJECTION, SOLUTION INTRAVENOUS; SUBCUTANEOUS ONCE
Status: COMPLETED | OUTPATIENT
Start: 2018-11-27 | End: 2018-11-27

## 2018-11-27 RX ORDER — VERAPAMIL HYDROCHLORIDE 2.5 MG/ML
2.5 INJECTION, SOLUTION INTRAVENOUS ONCE
Status: COMPLETED | OUTPATIENT
Start: 2018-11-27 | End: 2018-11-27

## 2018-11-27 RX ORDER — DIGOXIN 125 MCG
0.25 TABLET ORAL ONCE
Status: COMPLETED | OUTPATIENT
Start: 2018-11-27 | End: 2018-11-27

## 2018-11-27 RX ORDER — FENTANYL CITRATE 50 UG/ML
25-200 INJECTION, SOLUTION INTRAMUSCULAR; INTRAVENOUS
Status: DISCONTINUED | OUTPATIENT
Start: 2018-11-27 | End: 2018-11-27 | Stop reason: HOSPADM

## 2018-11-27 RX ORDER — DIGOXIN 125 MCG
0.25 TABLET ORAL DAILY
Status: DISCONTINUED | OUTPATIENT
Start: 2018-11-28 | End: 2018-11-28

## 2018-11-27 RX ORDER — FUROSEMIDE 10 MG/ML
40 INJECTION INTRAMUSCULAR; INTRAVENOUS ONCE
Status: COMPLETED | OUTPATIENT
Start: 2018-11-27 | End: 2018-11-27

## 2018-11-27 RX ORDER — METOPROLOL SUCCINATE 50 MG/1
50 TABLET, EXTENDED RELEASE ORAL DAILY
Status: DISCONTINUED | OUTPATIENT
Start: 2018-11-27 | End: 2018-12-03 | Stop reason: HOSPADM

## 2018-11-27 RX ORDER — MIDAZOLAM HYDROCHLORIDE 1 MG/ML
.5-1 INJECTION, SOLUTION INTRAMUSCULAR; INTRAVENOUS
Status: DISCONTINUED | OUTPATIENT
Start: 2018-11-27 | End: 2018-11-27 | Stop reason: HOSPADM

## 2018-11-27 RX ORDER — HEPARIN SODIUM 1000 [USP'U]/ML
1000-10000 INJECTION, SOLUTION INTRAVENOUS; SUBCUTANEOUS
Status: DISCONTINUED | OUTPATIENT
Start: 2018-11-27 | End: 2018-11-27 | Stop reason: HOSPADM

## 2018-11-27 RX ORDER — LIDOCAINE HYDROCHLORIDE 10 MG/ML
20 INJECTION INFILTRATION; PERINEURAL
Status: DISCONTINUED | OUTPATIENT
Start: 2018-11-27 | End: 2018-11-27 | Stop reason: HOSPADM

## 2018-11-27 RX ORDER — FUROSEMIDE 10 MG/ML
20 INJECTION INTRAMUSCULAR; INTRAVENOUS ONCE
Status: COMPLETED | OUTPATIENT
Start: 2018-11-27 | End: 2018-11-27

## 2018-11-27 RX ORDER — PHENYLEPHRINE HYDROCHLORIDE 10 MG/ML
100-500 INJECTION INTRAVENOUS
Status: DISCONTINUED | OUTPATIENT
Start: 2018-11-27 | End: 2018-11-28 | Stop reason: HOSPADM

## 2018-11-27 RX ADMIN — LIDOCAINE HYDROCHLORIDE 10 ML: 10 INJECTION, SOLUTION INFILTRATION; PERINEURAL at 16:02

## 2018-11-27 RX ADMIN — HEPARIN SODIUM 3000 UNITS: 1000 INJECTION, SOLUTION INTRAVENOUS; SUBCUTANEOUS at 16:14

## 2018-11-27 RX ADMIN — Medication 10 ML: at 14:00

## 2018-11-27 RX ADMIN — MIDAZOLAM 2 MG: 1 INJECTION INTRAMUSCULAR; INTRAVENOUS at 15:55

## 2018-11-27 RX ADMIN — HEPARIN SODIUM 1000 UNITS: 200 INJECTION, SOLUTION INTRAVENOUS at 15:56

## 2018-11-27 RX ADMIN — VERAPAMIL HYDROCHLORIDE 2.5 MG: 2.5 INJECTION INTRAVENOUS at 16:09

## 2018-11-27 RX ADMIN — HEPARIN SODIUM AND DEXTROSE 22 UNITS/KG/HR: 10000; 5 INJECTION INTRAVENOUS at 18:10

## 2018-11-27 RX ADMIN — IOPAMIDOL 60 ML: 755 INJECTION, SOLUTION INTRAVENOUS at 16:26

## 2018-11-27 RX ADMIN — MIDAZOLAM 1 MG: 1 INJECTION INTRAMUSCULAR; INTRAVENOUS at 16:04

## 2018-11-27 RX ADMIN — MIDAZOLAM 1 MG: 1 INJECTION INTRAMUSCULAR; INTRAVENOUS at 16:01

## 2018-11-27 RX ADMIN — DIGOXIN 0.25 MG: 125 TABLET ORAL at 18:12

## 2018-11-27 RX ADMIN — ACETAMINOPHEN 500 MG: 500 TABLET ORAL at 08:13

## 2018-11-27 RX ADMIN — Medication 10 ML: at 06:00

## 2018-11-27 RX ADMIN — NITROGLYCERIN 100 MCG: 5 INJECTION, SOLUTION INTRAVENOUS at 16:09

## 2018-11-27 RX ADMIN — METOPROLOL SUCCINATE 50 MG: 50 TABLET, FILM COATED, EXTENDED RELEASE ORAL at 10:03

## 2018-11-27 RX ADMIN — FUROSEMIDE 20 MG: 10 INJECTION, SOLUTION INTRAMUSCULAR; INTRAVENOUS at 14:08

## 2018-11-27 RX ADMIN — HEPARIN SODIUM AND DEXTROSE 22 UNITS/KG/HR: 10000; 5 INJECTION INTRAVENOUS at 13:15

## 2018-11-27 RX ADMIN — FENTANYL CITRATE 50 MCG: 50 INJECTION, SOLUTION INTRAMUSCULAR; INTRAVENOUS at 16:00

## 2018-11-27 RX ADMIN — CARVEDILOL 3.12 MG: 3.12 TABLET, FILM COATED ORAL at 08:13

## 2018-11-27 RX ADMIN — FUROSEMIDE 40 MG: 10 INJECTION, SOLUTION INTRAMUSCULAR; INTRAVENOUS at 10:03

## 2018-11-27 RX ADMIN — HEPARIN SODIUM 4000 UNITS: 5000 INJECTION INTRAVENOUS; SUBCUTANEOUS at 06:05

## 2018-11-27 RX ADMIN — DILTIAZEM HYDROCHLORIDE 15 MG/HR: 5 INJECTION INTRAVENOUS at 08:02

## 2018-11-27 RX ADMIN — HEPARIN SODIUM AND DEXTROSE 17 UNITS/KG/HR: 10000; 5 INJECTION INTRAVENOUS at 01:44

## 2018-11-27 RX ADMIN — DILTIAZEM HYDROCHLORIDE 15 MG/HR: 5 INJECTION INTRAVENOUS at 00:49

## 2018-11-27 NOTE — CONSULTS
HISTORY OF PRESENTING ILLNESS      Danielle Fitzgerald III is a 70 y.o. male hospitalized with chest pain noted to have AF/AFL with RVR that failed to respond to cardizem drip. He underwent CHARLIE/DCCV that failed to restore sinus rhythm; subsequently ibutilide failed as well. He continues to exhibit AF/AFL with RVR. Echocardiogram revealed an LV function of 35%; left heart catheterization is planned for tomorrow afternoon. He denies palpitations, fatigue however his tachycardia is suspected to be contributing to his chest pain.         ACTIVE PROBLEM LIST     Patient Active Problem List    Diagnosis Date Noted    Atrial fibrillation with RVR ([facility] [address] 11/25/2018    Memory loss 09/04/2014    Lumbar radiculitis 07/18/2013    Hypogonadism male 04/14/2011    Hyperlipemia 02/04/2011    Pre-diabetes 01/06/2011    Sleep apnea 12/21/2010    RAMIREZ (obstructive sleep apnea) 05/17/2010    ED (erectile dysfunction) 05/17/2010    Hypogonadism 05/17/2010    Seasonal allergic reaction 05/17/2010    Depression with anxiety 05/17/2010           MEDICATIONS     Current Facility-Administered Medications   Medication Dose Route Frequency    heparin 25,000 units in D5W 250 ml infusion  11-25 Units/kg/hr IntraVENous TITRATE    fentaNYL citrate (PF) injection  mcg   mcg IntraVENous Multiple    lidocaine (XYLOCAINE) 5 % ointment   Topical PRN    benzocaine (HURRICANE) 20 % spray   Mucous Membrane PRN    midazolam (VERSED) injection 0.5-10 mg  0.5-10 mg IntraVENous Multiple    carvedilol (COREG) tablet 3.125 mg  3.125 mg Oral BID    acetaminophen (TYLENOL) tablet 500 mg  500 mg Oral Q4H PRN    prochlorperazine (COMPAZINE) with saline injection 5 mg  5 mg IntraVENous Q6H PRN    heparin (porcine) 1,000 unit/mL injection 2,000 Units  2,000 Units IntraVENous ONCE    sodium chloride (NS) flush 5-10 mL  5-10 mL IntraVENous Q8H    sodium chloride (NS) flush 5-10 mL  5-10 mL IntraVENous PRN    sodium chloride (NS) flush 5-10 mL  5-10 mL IntraVENous Q8H    sodium chloride (NS) flush 5-10 mL  5-10 mL IntraVENous PRN    dilTIAZem (CARDIZEM) 125 mg in dextrose 5% 125 mL infusion  15 mg/hr IntraVENous TITRATE           PAST MEDICAL HISTORY     Past Medical History:   Diagnosis Date    Anxiety     BPH     Depression     ED (erectile dysfunction)     Hearing loss     Herniated disc     Hypogonadism     Sleep apnea            PAST SURGICAL HISTORY     Past Surgical History:   Procedure Laterality Date    ECHO STRESS      HX HERNIA REPAIR      HX ORTHOPAEDIC      knee surgery          ALLERGIES     No Known Allergies       FAMILY HISTORY     Family History   Problem Relation Age of Onset    Cancer Mother     Diabetes Father     Hypertension Father     Dementia Father     Cancer Sister     negative for cardiac disease       SOCIAL HISTORY     Social History     Socioeconomic History    Marital status: LIFE PARTNER     Spouse name: Not on file    Number of children: Not on file    Years of education: Not on file    Highest education level: Not on file   Tobacco Use    Smoking status: Former Smoker    Smokeless tobacco: Never Used   Substance and Sexual Activity    Alcohol use:  Yes     Alcohol/week: 3.0 oz     Types: 6 Cans of beer per week     Comment: social    Drug use: No    Sexual activity: Yes         MEDICATIONS     Current Facility-Administered Medications   Medication Dose Route Frequency    heparin 25,000 units in D5W 250 ml infusion  11-25 Units/kg/hr IntraVENous TITRATE    fentaNYL citrate (PF) injection  mcg   mcg IntraVENous Multiple    lidocaine (XYLOCAINE) 5 % ointment   Topical PRN    benzocaine (HURRICANE) 20 % spray   Mucous Membrane PRN    midazolam (VERSED) injection 0.5-10 mg  0.5-10 mg IntraVENous Multiple    carvedilol (COREG) tablet 3.125 mg  3.125 mg Oral BID    acetaminophen (TYLENOL) tablet 500 mg  500 mg Oral Q4H PRN    prochlorperazine (COMPAZINE) with saline injection 5 mg  5 mg IntraVENous Q6H PRN    heparin (porcine) 1,000 unit/mL injection 2,000 Units  2,000 Units IntraVENous ONCE    sodium chloride (NS) flush 5-10 mL  5-10 mL IntraVENous Q8H    sodium chloride (NS) flush 5-10 mL  5-10 mL IntraVENous PRN    sodium chloride (NS) flush 5-10 mL  5-10 mL IntraVENous Q8H    sodium chloride (NS) flush 5-10 mL  5-10 mL IntraVENous PRN    dilTIAZem (CARDIZEM) 125 mg in dextrose 5% 125 mL infusion  15 mg/hr IntraVENous TITRATE       I have reviewed the nurses notes, vitals, problem list, allergy list, medical history, family, social history and medications. REVIEW OF SYMPTOMS      General: Pt denies excessive weight gain or loss. Pt is able to conduct ADL's  HEENT: Denies blurred vision, headaches, hearing loss, epistaxis and difficulty swallowing. Respiratory: Denies cough, congestion, shortness of breath, MATHEW, wheezing or stridor. Cardiovascular: Denies precordial pain, palpitations, edema or PND  Gastrointestinal: Denies poor appetite, indigestion, abdominal pain or blood in stool  Genitourinary: Denies hematuria, dysuria, increased urinary frequency  Musculoskeletal: Denies joint pain or swelling from muscles or joints  Neurologic: Denies tremor, paresthesias, headache, or sensory motor disturbance  Psychiatric: Denies confusion, insomnia, depression  Integumentray: Denies rash, itching or ulcers. Hematologic: Denies easy bruising, bleeding       PHYSICAL EXAMINATION      Vitals:    11/26/18 1900 11/26/18 2000 11/26/18 2142 11/26/18 2213   BP: 100/75 110/83     Pulse: 95 86     Resp: 29 25     Temp:  98.1 °F (36.7 °C)     SpO2: 91% 90% 95% 98%   Weight:       Height:         General: Well developed, in no acute distress.   HEENT: No jaundice, oral mucosa moist, no oral ulcers  Neck: Supple, no stiffness, no lymphadenopathy, supple  Heart:  irreg irreg, no murmur, gallop or rub, no jugular venous distention  Respiratory: Clear bilaterally x 4, no wheezing or rales  Abdomen:   Soft, non-tender, bowel sounds are active.   Extremities:  No edema, normal cap refill, no cyanosis. Musculoskeletal: No clubbing, no deformities  Neuro: A&Ox3, speech clear, gait stable, cooperative, no focal neurologic deficits  Skin: Skin color is normal. No rashes or lesions. Non diaphoretic, moist.  Vascular: 2+ pulses symmetric in all extremities       DIAGNOSTIC DATA      TELEMETRY: AF/AFL with RVR       LABORATORY DATA      Lab Results   Component Value Date/Time    WBC 6.1 11/26/2018 03:43 AM    HGB 11.8 (L) 11/26/2018 03:43 AM    HCT 37.6 11/26/2018 03:43 AM    PLATELET 751 (L) 75/59/2599 03:43 AM    MCV 84.3 11/26/2018 03:43 AM      Lab Results   Component Value Date/Time    Sodium 138 11/26/2018 03:43 AM    Potassium 3.8 11/26/2018 03:43 AM    Chloride 106 11/26/2018 03:43 AM    CO2 23 11/26/2018 03:43 AM    Anion gap 9 11/26/2018 03:43 AM    Glucose 102 (H) 11/26/2018 03:43 AM    BUN 11 11/26/2018 03:43 AM    Creatinine 1.04 11/26/2018 03:43 AM    BUN/Creatinine ratio 11 (L) 11/26/2018 03:43 AM    GFR est AA >60 11/26/2018 03:43 AM    GFR est non-AA >60 11/26/2018 03:43 AM    Calcium 7.7 (L) 11/26/2018 03:43 AM    Bilirubin, total 1.1 (H) 11/25/2018 04:58 PM    AST (SGOT) 26 11/25/2018 04:58 PM    Alk. phosphatase 54 11/25/2018 04:58 PM    Protein, total 7.2 11/25/2018 04:58 PM    Albumin 3.7 11/25/2018 04:58 PM    Globulin 3.5 11/25/2018 04:58 PM    A-G Ratio 1.1 11/25/2018 04:58 PM    ALT (SGPT) 67 11/25/2018 04:58 PM           ASSESSMENT      1. Atrial fibrillation  2. Atrial flutter  3. Chest pain  4. Cardiomyopathy  5. RAMIREZ         PLAN     Predominant rhythm currently is AF. Discussed various strategies with patient and his family including his daughter (who underwent PVC ablation in the past).  Following extensive discussion, will plan to continue carrdizem gtt and oral amiodarone; if he converts to sinus rhythm, will continue oral amiodarone and plan for expedited AF/AFL ablation as outpatient. If remains in atrial arrhythmia by Thursday, will plan for repeat attempt at [address] on amiodarone with possible consideration for AFL ablation. Thank you, Evaristo Iglesias DO and Dr. Jeannette Lopes for involving me in the care of this extraordinarily pleasant male. Please do not hesitate to contact me for further questions/concerns.          Dionte Vargas MD  Cardiac Electrophysiology / Cardiology    [address] [place] [address], [address]    [address] [facility]  [phone] / [phone] Fax   [phone] / [phone] Fax

## 2018-11-27 NOTE — PROGRESS NOTES
Bedside and Verbal shift change report given to Meme Natarajan RN (oncoming nurse) by Fernando Kellogg RN (offgoing nurse). Report included the following information SBAR, Azardex, [facility] and Cardiac Rhythm Afib.  
 
0950  - Ok to give metoprolol and lasix per Ramon Meza, NP 
1400 - Placed patient on O2 of 2L NC. O2 sats >95%. 1645 - Patient back from Cath lab. TR band to right wrist. 
1910 - Bedside and Verbal shift change report given to Sharif Archer RN (oncoming nurse) by Meme Natarajan RN (offgoing nurse). Report included the following information Azar SEVILLAdex, MAR and Cardiac Rhythm Afib. 1935 - TR Band removed. Quick clot applied. No bleeding or hematoma noted. Reminded patient not to push, pull, or lift anything with his right hand. Encouraged patient to call for assistance.

## 2018-11-27 NOTE — CDMP QUERY
Please clarify if this patient has acute  systolic CHF and if it was POA or developed after admission:  
 
=> Acute systolic congestive  heart failure  developed after admission AEB elevated ProBNP,acute pulmonary edema on cxr in the setting of Aflutter requiring Cardio consult and  IV Lasix =>Acute Systolic CHF POA AEB elevated ProBNP,acute pulmonary edema on cxr in the setting of Aflutter requiring Cardio consult and  IV Lasix  
=> Other explanation of clinical findings  
=> Clinically Undetermined (no explanation for clinical findings) The medical record reflects the following clinical findings, treatment, and risk factors. Risk Factors:  69 yo M admitted with Aflutter on 11/25 Clinical Indicators:  11/25 Pro BNP 2639  11/25 CXR no acute process  11/26 CXR: pulmonary edema and small pleural effusions 11/26 PN states \"HFrEF: ProBNP elevated and echo reveals LVEF 20% with mild LA and RA dilation\" 11/26 cardio PN \" Acute on chronic CHF NYHA class III, new onset: EF 25% per echo\" Treatment: IV Lasix 40 mg x 2 Please clarify and document your clinical opinion in the progress notes and discharge summary including the definitive and/or presumptive diagnosis, (suspected or probable), related to the above clinical findings. Please include clinical findings supporting your diagnosis. Thank you for your time [facility] RN/BSN, CCDS Desk:   [phone] Other:  [phone]

## 2018-11-27 NOTE — PROGRESS NOTES
30 [facility], [address] with 301 [facility] Resident Progress Note in Brief S: RN called to inform that pt \"vomited to his knees within 60 seconds of receiving PO amiodarone\" Patient seen and examined at bedside. Patient is awake and alert, denies nausea at this time, no abdominal pain. Attributes vomiting episode to post-CHARLIE. Denies cp/sob. Complains of mild headache, which he had received tylenol for. Pt has no other concerns at the time O: 
Visit Vitals /75 Pulse 95 Temp 98.9 °F (37.2 °C) Resp 29 Ht 5' 11\" (1.803 m) Wt 200 lb (90.7 kg) SpO2 91% BMI 27.89 kg/m² Physical Examination:  
General appearance - alert, well appearing, and in no distress Chest - clear to auscultation, no wheezes, rales or rhonchi, symmetric air entry Heart - normal rate, irregular rhythm Abdomen - soft, nontender, nondistended Neurological - alert, oriented, normal speech, no focal findings Extremities - no pedal edema A/P:  
Kaylyn Fothergill is a 70 y.o. admitted for atrial flutter w/ RVR Called cardiology on-call Dr. Jazmín Link; advised to give 150mg IV amiodarone tonight and consider PO amiodarone tomorrow if pt is tolerating PO. 
 
- 150mg IV amiodarone now 
- NPO after 7am tomorrow for cath 
- IV compazine for nausea - Continue to monitor pt  
---------------------------------------------- 
@21:30 RN called to inform that pt is SOB and diaphoretic. Pt seen and examined at bedside w. Dr. Evelyn Sanchez. Pt was on 4LO2 NC, momentarily dropped to SpO2 86% but otherwise SpO2 >90%. Denies CP. Breathing improved w/ sitting up. Visit Vitals /83 (BP 1 Location: Left arm, BP Patient Position: At rest) Pulse 86 Temp 98.1 °F (36.7 °C) Resp 25 Ht 5' 11\" (1.803 m) Wt 200 lb (90.7 kg) SpO2 (P) 95% BMI 27.89 kg/m² General Appearance: sitting upright, anxious Chest: Symmetric chest expansion, equal air entry b/l.  Faint expiratory wheezes in anterior lung fields b/l. No rales/rhonchi. CV: Normal rate, irregular rhythm Neuro: A,Ox3 
 
- Duo-nebs - Trop; if negative, no need to trend further as pt had trops negx3 earlier today - D-dimer - CXR 
 
----------------------------------------------------- 
@22:10 - pt started on BiPAP - refer to BiPAP note; re-evaluate in 4 hrs - CXR showed development of pulmonary edema --> 1 dose IV lasix ordered - D-dimer 1.40; CTA neg yesterday, pt already on heparin drip per cardiology - CTA chest ordered - Pt already on heparin drip, not yet within therapeutic range - Trop neg, no longer trending 
- Continue to monitor pt 
 
------------------------------------------------------- 
@04:16, RN called to inform that pt reports improvement on BiPAP and s/p lasix (Output 1300cc). RtT at bedside recommend hi-flow O2 
- Start Hi-Flow O2 now - Continue to monitor pt - Wean off as tolerated Please see daily progress note for detailed plan. Mil Gresham MD 
Family Medicine Resident

## 2018-11-27 NOTE — PROCEDURES
Prelim report:    LM- Normal  LAD- Normal.  LCX- Normal.  RCA- Normal.     No CAD. LVEDP- 2    Had hypotension with 100mcg NTG and Verapamil at the onset of the cath when BP went down to 70 systolic. Received 250cc NS. Fred Dial MD, [facility]

## 2018-11-27 NOTE — NURSE NAVIGATOR
Chart reviewed by Heart Failure Nurse Navigator. Heart Failure database completed. EF:  20% with severe diffuse hypokinesis, RV systolic function moderately reduced. ACEi/ARB/ARNi: Contraindicated at present due to hypotension. BB: metoprolol succinate 50 mg daily. Aldosterone Antagonist: Contraindicated at present due to hypotension. CRT not currently indicated. NYHA Functional Class III. Heart Failure Teach Back in Patient Education. Heart Failure Avoiding Triggers on Discharge Instructions. Cardiologist: Dr. Christina King Post discharge follow up phone call to be made within 48-72 hours of discharge.

## 2018-11-27 NOTE — PROGRESS NOTES
[facility] Name: Dayanna Phlegm III MRN: [id number] : 1955 Hospital: [address] Date: 2018 Impression Plan 1. Afib/flutter with RVR 2. Acute hypoxic respiratory failure 3. RAMIREZ, untreated 4. Systolic heart failure · Cardiology following · Failed repeat cardioversion, planned to undergo ablation on Thursday · Dilt drip · Heparin drip · Cardiac cath today · Agree with additional diuresis, closely monitor I/O's · Goal sats >90%, wean HF and transition to NC as tolerated · Will set up with sleep eval as an outpatient when he is ready for discharge · NPO for procedure Radiology ( personally reviewed) CTA chest: no PE, aortic aneurysm or dissection, lungs clear, small pleural effusion CXR without edema, airspace disease ABG No results for input(s): PHI, PO2I, PCO2I in the last 72 hours. Subjective Failed repeat cardioversion. More short of breath and hypoxic, repeat CXR with edema. Received lasix, placed on BiPAP. Currently on HF at 25L and 60%, decreased to 15L and maintained sats >90%. Feeling much better today. Review of Systems: 
Review of systems not obtained due to patient factors. Past Medical History:  
Diagnosis Date  Anxiety  BPH  Depression  ED (erectile dysfunction)  Hearing loss  Herniated disc  Hypogonadism  Sleep apnea Past Surgical History:  
Procedure Laterality Date  ECHO STRESS    
 HX HERNIA REPAIR    
 HX ORTHOPAEDIC    
 knee surgery Prior to Admission medications Medication Sig Start Date End Date Taking? Authorizing Provider  
tamsulosin (FLOMAX) 0.4 mg capsule Take 0.4 mg by mouth daily as needed (trouble urinating). Yes Provider, Historical  
ibuprofen (MOTRIN) 200 mg tablet Take 200 mg by mouth every eight (8) hours as needed for Pain. Yes Provider, Historical  
 
Current Facility-Administered Medications Medication Dose Route Frequency  metoprolol succinate (TOPROL-XL) XL tablet 50 mg  50 mg Oral DAILY  heparin 25,000 units in D5W 250 ml infusion  11-25 Units/kg/hr IntraVENous TITRATE  sodium chloride (NS) flush 5-10 mL  5-10 mL IntraVENous Q8H  
 sodium chloride (NS) flush 5-10 mL  5-10 mL IntraVENous Q8H  
 dilTIAZem (CARDIZEM) 125 mg in dextrose 5% 125 mL infusion  15 mg/hr IntraVENous TITRATE No Known Allergies Social History Tobacco Use  Smoking status: Former Smoker  Smokeless tobacco: Never Used Substance Use Topics  Alcohol use: Yes Alcohol/week: 3.0 oz Types: 6 Cans of beer per week Comment: social  
  
Family History Problem Relation Age of Onset  Cancer Mother  Diabetes Father  Hypertension Father  Dementia Father  Cancer Sister Laboratory: I have personally reviewed the critical care flowsheet and labs. Recent Labs  
  11/27/18 
0410 11/26/18 
0343 11/25/18 
1658 WBC 12.6* 6.1 10.9 HGB 13.8 11.8* 14.5 HCT 43.4 37.6 45.4  149* 189 Recent Labs  
  11/27/18 
0410 11/26/18 
0343 11/25/18 
1658  138 139  
K 4.5 3.8 4.0  
 106 104 CO2 24 23 26 * 102* 120* BUN 12 11 14 CREA 1.09 1.04 1.10 CA 8.3* 7.7* 9.0 MG 2.1 2.4 2.1 ALB  --   --  3.7 SGOT  --   --  26 ALT  --   --  67 Objective: Mode Rate Tidal Volume Pressure FiO2 PEEP  
         60 % Vital Signs:   
 TMAX(24) Intake/Output:  
Last shift:      Ventilator Volumes Vt Spont (ml): 712 ml Ve Observed (l/min): 17.8 l/min Last 3 shifts: 11/27 0701 - 11/27 1900 In: -  
Out: 200 [Urine:200]RRIOLAST3 Intake/Output Summary (Last 24 hours) at 11/27/2018 1108 Last data filed at 11/27/2018 9904 Gross per 24 hour Intake 724.26 ml Output 2075 ml Net -1350.74 ml EXAM: 
GENERAL: well developed and in no distress, HEENT:  PERRL, EOMI, no alar flaring or epistaxis, oral mucosa moist without cyanosis, NECK:  no jugular vein distention, no retractions, no thyromegaly or masses, LUNGS: scattered crackles, HEART:  Irregularly irregular, with no MGR; no edema is present, ABDOMEN:  soft with no tenderness, bowel sounds present, EXTREMITIES:  warm with no cyanosis, SKIN:  no jaundice or ecchymosis and NEUROLOGIC:  alert and oriented, grossly non-focal 
 
Fara Bolaños MD 
[facility] [place]

## 2018-11-27 NOTE — PROGRESS NOTES
My patient, Debra Harris, has been maintained on continuous BiPAP since 22:31 on 11/26/18. This documentation serves as my reassessment of this patient for this therapy. An arterial blood gas was drawn at 22:05 on 11/26/18. Arterial Blood Gas result:  pO2 83.8; pCO2 31.9; pH 7.439;  HCO3 21.1, %O2 Sat 96.7. Device/O2? 90. 
 
Current Vital Signs: 
 
Visit Vitals BP (!) 129/93 Pulse 91 Temp 98.4 °F (36.9 °C) Resp 24 Ht 5' 11\" (1.803 m) Wt 200 lb (90.7 kg) SpO2 98% BMI 27.89 kg/m² Use of accessory muscles? No 
 
Lung exam: Symmetric chest expansion, CTAB Level of Consciousness: sleeping comfortably Signs of Discomfort: No 
 
Mask leaks: No 
 
Skin condition (to include any change of signs of skin breakdown): No 
 
It is necessary to continue continuous BiPAP for Ben Ding III at this time due to O2 requirement. An assessment of this patient and the decision regarding the necessity of this therapy will be made on a daily (if not more frequent) basis. Plan to wean off as tolerated Javier Chapman MD 
Family Medicine Resident

## 2018-11-27 NOTE — CDMP QUERY
2) Please clarify if this patient is (was) being treated/managed for:  
 
=> Acute respiratory failure with hypoxia 2/2 acute systolic CHF AEB tachypnea and hypoxia while on supplemental oxygen requiring Bipap and IV Lasix   
=> Other explanation of clinical findings  
=> Clinically Undetermined (no explanation for clinical findings) The medical record reflects the following clinical findings, treatment, and risk factors. Risk Factors:  69 yo M admitted with Aflutter on 11/25 Clinical Indicators: RR 25 with 90% O2 sat on 2-4 L O2 via NC   then RR 32 with 92% O2 sats while on Bipap  
11/26 Nursing note states \" pt is SOB and diaphoretic, pt t was on 4LO2 NC, momentarily dropped to SpO2 86% \" 
 11/26 PN \" SOB/New oxygen requirement: -Patient on BIPAP, wean as tolerated\" \"CXR: pulmonary edema and small pleural effusions \" Treatment: Bipap, IV Lasix 40 mg x 2 Please clarify and document your clinical opinion in the progress notes and discharge summary including the definitive and/or presumptive diagnosis, (suspected or probable), related to the above clinical findings. Please include clinical findings supporting your diagnosis. Thank you for your time [facility] RN/BSN, CCDS Desk:   [phone] Other:  [phone]

## 2018-11-27 NOTE — PROGRESS NOTES
The plan is for pt to have a cardiac catherization today @ 3 pm. 
When stable for discharge,pt has been accepted for the hospital to home visit for CHF education with [facility]. I will continue to follow pt for other needs.  
Herminio Ramirez

## 2018-11-27 NOTE — PROGRESS NOTES
My patient, Vincenzo Canales, is an appropriate candidate at this time for continious non-invasive ventilation with bilevel positive airway pressure (BiPAP) due to SpO2 <90% on non-rebreather O2 mask, s/p duo-nebs. Dr. Devi Reilly and I have made the determination to start this treatment at 10:31 PM on 11/26/18. This documentation serves as my initial assessment of this patient for this therapy. An arterial blood gas was drawn at 22:05. Arterial Blood Gas result:  pO2 83.8; pCO2 31.9; pH 7.439;  HCO3 21.1, %O2 Sat 96.7. Current Vital Signs: 
 
Visit Vitals /83 (BP 1 Location: Left arm, BP Patient Position: At rest) Pulse 86 Temp 98.1 °F (36.7 °C) Resp 25 Ht 5' 11\" (1.803 m) Wt 200 lb (90.7 kg) SpO2 98% BMI 27.89 kg/m² Use of accessory muscles? No 
 
Lung exam: Mild end-expiratory wheezes in anterior lung-fields Level of Consciousness: A,Ox3 Most recent EKG/Telemetry rhythm: A. Fib w/ RVR I will plan to [facility] after 4 hours of this therapy (or sooner if warranted).  
 
Estephanie Stevens MD

## 2018-11-27 NOTE — PROGRESS NOTES
Problem: Falls - Risk of 
Goal: *Absence of Falls Document Pritesh Salvador Fall Risk and appropriate interventions in the flowsheet. Outcome: Progressing Towards Goal 
Fall Risk Interventions: 
  
 
  
 
Medication Interventions: Bed/chair exit alarm, Patient to call before getting OOB, Teach patient to arise slowly

## 2018-11-27 NOTE — PROGRESS NOTES
Bedside and Verbal shift change report given to Debbie LOVELL (oncoming nurse) by Neil Mckeon RN (offgoing nurse). Report included the following information SBAR, Kardex, ED Summary, Procedure Summary, Intake/Output, MAR, Accordion and Recent Results. 1930 Bedside shift report performed. Pt had episode of nausea/vomitting. Pt cleaned. Spoke with family practice for PRN antiemetic orders, and possible re-administration of amiodarone pill. 2130 Pt rang out c/o of SOB. Pt states \"I feel like I cannot catch my breath\". Placed pt on 4L/NC, sat pt up in bed. Spoke with family practice who stated they would be at the bedside to assess pt. Labs drawn at the bedside at this time. 2220 Respiratory at bedside, despite nebulizer pt remains SOB. Respiratory to setup BIPAP. Radiology at bedside for CXR. 2310 Orders received and carried out for lasix. Pt c/o of wearing the BIPAP. RT called to the bedside to decrease pressure support. Explained the benefits and the need to wear the BIPAP mask. Pt complies at this time. Updated pts significant other Kevinwilly Marsh) on pts condition. 0400 Labs drawn and sent at this time. Pt resting in bed on BIPAP. Pt states \"My breathing feels so much better\". Pt requesting to take BIPAP off. RT at bedside and recommending Hi-flow. Spoke with family practice and received orders for pt to be placed on Hi-flow NC PRN. [phone] with food services about pts receiving breakfast prior to NPO status at 0730. Per MELINA Roberto's note. Pt may have breakfast this morning. Cardiac diet ordered for breakfast. Will d/c after pt receives tray. Bedside and Verbal shift change report given to Ceci (oncoming nurse) by Noemy Storey RN (offgoing nurse). Report included the following information SBAR, Kardex, ED Summary, Procedure Summary, Intake/Output, MAR, Accordion, Recent Results and Med Rec Status.

## 2018-11-28 LAB
ANION GAP SERPL CALC-SCNC: 8 MMOL/L (ref 5–15)
APTT PPP: 47.4 SEC (ref 22.1–32)
APTT PPP: 58.8 SEC (ref 22.1–32)
APTT PPP: 61.6 SEC (ref 22.1–32)
BUN SERPL-MCNC: 11 MG/DL (ref 6–20)
BUN/CREAT SERPL: 10 (ref 12–20)
CALCIUM SERPL-MCNC: 8.4 MG/DL (ref 8.5–10.1)
CHLORIDE SERPL-SCNC: 104 MMOL/L (ref 97–108)
CO2 SERPL-SCNC: 27 MMOL/L (ref 21–32)
CREAT SERPL-MCNC: 1.11 MG/DL (ref 0.7–1.3)
ERYTHROCYTE [DISTWIDTH] IN BLOOD BY AUTOMATED COUNT: 13.3 % (ref 11.5–14.5)
GLUCOSE SERPL-MCNC: 104 MG/DL (ref 65–100)
HCT VFR BLD AUTO: 40.6 % (ref 36.6–50.3)
HGB BLD-MCNC: 12.8 G/DL (ref 12.1–17)
MAGNESIUM SERPL-MCNC: 2.1 MG/DL (ref 1.6–2.4)
MCH RBC QN AUTO: 26.3 PG (ref 26–34)
MCHC RBC AUTO-ENTMCNC: 31.5 G/DL (ref 30–36.5)
MCV RBC AUTO: 83.4 FL (ref 80–99)
NRBC # BLD: 0 K/UL (ref 0–0.01)
NRBC BLD-RTO: 0 PER 100 WBC
PLATELET # BLD AUTO: 181 K/UL (ref 150–400)
PMV BLD AUTO: 11.1 FL (ref 8.9–12.9)
POTASSIUM SERPL-SCNC: 3.9 MMOL/L (ref 3.5–5.1)
RBC # BLD AUTO: 4.87 M/UL (ref 4.1–5.7)
SODIUM SERPL-SCNC: 139 MMOL/L (ref 136–145)
THERAPEUTIC RANGE,PTTT: ABNORMAL SECS (ref 58–77)
WBC # BLD AUTO: 7.4 K/UL (ref 4.1–11.1)

## 2018-11-28 PROCEDURE — 74011000250 HC RX REV CODE- 250: Performed by: NURSE PRACTITIONER

## 2018-11-28 PROCEDURE — 77010033678 HC OXYGEN DAILY

## 2018-11-28 PROCEDURE — 83735 ASSAY OF MAGNESIUM: CPT

## 2018-11-28 PROCEDURE — 85027 COMPLETE CBC AUTOMATED: CPT

## 2018-11-28 PROCEDURE — 74011250636 HC RX REV CODE- 250/636: Performed by: INTERNAL MEDICINE

## 2018-11-28 PROCEDURE — 65660000000 HC RM CCU STEPDOWN

## 2018-11-28 PROCEDURE — 74011000258 HC RX REV CODE- 258: Performed by: NURSE PRACTITIONER

## 2018-11-28 PROCEDURE — 74011250636 HC RX REV CODE- 250/636: Performed by: NURSE PRACTITIONER

## 2018-11-28 PROCEDURE — 85730 THROMBOPLASTIN TIME PARTIAL: CPT

## 2018-11-28 PROCEDURE — 80048 BASIC METABOLIC PNL TOTAL CA: CPT

## 2018-11-28 PROCEDURE — 36415 COLL VENOUS BLD VENIPUNCTURE: CPT

## 2018-11-28 PROCEDURE — 74011250637 HC RX REV CODE- 250/637: Performed by: NURSE PRACTITIONER

## 2018-11-28 RX ORDER — AMIODARONE HYDROCHLORIDE 200 MG/1
400 TABLET ORAL DAILY
Status: DISCONTINUED | OUTPATIENT
Start: 2018-12-01 | End: 2018-12-03 | Stop reason: HOSPADM

## 2018-11-28 RX ORDER — AMIODARONE HYDROCHLORIDE 200 MG/1
400 TABLET ORAL 3 TIMES DAILY
Status: COMPLETED | OUTPATIENT
Start: 2018-11-28 | End: 2018-11-30

## 2018-11-28 RX ORDER — DIGOXIN 0.25 MG/ML
250 INJECTION INTRAMUSCULAR; INTRAVENOUS ONCE
Status: COMPLETED | OUTPATIENT
Start: 2018-11-28 | End: 2018-11-28

## 2018-11-28 RX ORDER — DIGOXIN 125 MCG
0.12 TABLET ORAL DAILY
Status: DISCONTINUED | OUTPATIENT
Start: 2018-11-29 | End: 2018-12-03 | Stop reason: HOSPADM

## 2018-11-28 RX ORDER — HEPARIN SODIUM 5000 [USP'U]/ML
2000 INJECTION, SOLUTION INTRAVENOUS; SUBCUTANEOUS
Status: COMPLETED | OUTPATIENT
Start: 2018-11-28 | End: 2018-11-28

## 2018-11-28 RX ADMIN — HEPARIN SODIUM 2000 UNITS: 5000 INJECTION INTRAVENOUS; SUBCUTANEOUS at 02:08

## 2018-11-28 RX ADMIN — AMIODARONE HYDROCHLORIDE 400 MG: 200 TABLET ORAL at 17:26

## 2018-11-28 RX ADMIN — HEPARIN SODIUM AND DEXTROSE 24 UNITS/KG/HR: 10000; 5 INJECTION INTRAVENOUS at 17:54

## 2018-11-28 RX ADMIN — HEPARIN SODIUM AND DEXTROSE 24 UNITS/KG/HR: 10000; 5 INJECTION INTRAVENOUS at 06:18

## 2018-11-28 RX ADMIN — METOPROLOL SUCCINATE 50 MG: 50 TABLET, FILM COATED, EXTENDED RELEASE ORAL at 09:03

## 2018-11-28 RX ADMIN — AMIODARONE HYDROCHLORIDE 400 MG: 200 TABLET ORAL at 21:45

## 2018-11-28 RX ADMIN — DILTIAZEM HYDROCHLORIDE 5 MG/HR: 5 INJECTION INTRAVENOUS at 08:57

## 2018-11-28 RX ADMIN — Medication 10 ML: at 14:49

## 2018-11-28 RX ADMIN — AMIODARONE HYDROCHLORIDE 400 MG: 200 TABLET ORAL at 10:28

## 2018-11-28 RX ADMIN — DIGOXIN 250 MCG: 0.25 INJECTION INTRAMUSCULAR; INTRAVENOUS at 02:50

## 2018-11-28 RX ADMIN — Medication 10 ML: at 21:46

## 2018-11-28 NOTE — PROGRESS NOTES
30 [facility], [address] with 301 [facility] Resident Progress Note in Brief S: S/p cardiac cath. Patient seen and examined at bedside w/ Dr. Sandoval Orozco. Patient is awake and alert, reports improvement in breathing compared to last night/earlier today. Denies cp/sob/n/v Pt has no other concerns at the time O: 
Visit Vitals /62 Pulse 100 Temp 98 °F (36.7 °C) Resp 24 Ht 5' 11\" (1.803 m) Wt 200 lb (90.7 kg) SpO2 95% BMI 27.89 kg/m² 3L O2 NC Physical Examination:  
General appearance - alert, well appearing, and in no distress Chest - symmetric air entry, faint bibasilar crackles Heart - tachycardia, irregular rhythm Abdomen - soft, nontender, nondistended Neurological - alert, oriented, normal speech, no focal findings Extremities -  no pedal edema A/P:  
Faith Mike is a 70 y.o. admitted for A flutter w/ RVR 
 
S/p cardiac cath. Stable, doing well. - Currently on 3L O2 NC. Goal SpO2 >90%; consider Hi Flow NC overnight if having inc O2 req - Weaned off dilt drip. Currently on heparin. Metoprolol, digoxin - Ablation planned for Thursday 
- Continue to monitor pt Please see daily progress note for detailed plan. Asya Cortes MD 
Family Medicine Resident

## 2018-11-28 NOTE — PROGRESS NOTES
Samira Schmid [address] Office [phone] Fax [phone] Assessment and Plan Luci Hair is a 70 y.o. male admitted for atrial flutter with RVR. He has spent 3 night(s) in the hospital. 
 
24 Hour Events: Patient underwent Cath and weaned of Cardizem drip. Atrial flutter with RVR in setting of hypotension: Unclear etiology; possibly 2/2 untreated RAMIREZ. CTA shows small pleural effusions but negative for aortic dissection, PE, or pericardial effusion. TSH wnl and UDS neg. BNP 2639 and CK-MB wnl. S/p failed cardioversion and [place]. BL LE Doppler: negative. Echo with LVEF 20% 
- CHARLIE: Left atrial appendage: No mass was identified. 
- Continue Heparin drip with plan to start [address] on discharge 
- Continue Metoprolol 50mg daily and Digoxin 0.25mg daily - Resumed diltiazem gtt this am   
- Cariology following, appreciate recs: Cath negative for CAD. Planned for DCCV in EP lab and possible AF ablation on Thursday 11/29/18 
- Plan for Eliquis after 11/29 procedure 
  
Acute HFrEF: Newly detected this admission. ProBNP: 2639 (elevated). Echo reveals LVEF 20% with mild LA and RA dilation.  
-  Monitor fluid status closely. Strict I/Os, daily weights. 
- Continue BB, not on ACEi Pleuritic Chest Pain: Arrhythmia vs GERD. ACS less likely given neg trop throughout admission. Received Fentanyl x1 but now avoiding due to low BPs. Received Toradol x2 and GI cocktail.  
-Continue Tylenol as needed Acute hypoxic respiratory failure 2/2 pulmonary edema: Improving, weaned from bipap to high flow to now NC. D-dimer: 1.4 (H), initial CTA neg for PE and on heparin gtt. BLE duplex neg for DVT. ABG: pH 7.44, pCO2 32 pO2 84. CXR: pulmonary edema and small pleural effusions - Wean O2 as tolerated to keep sats > 90% - Will diurese prn - Pulm following, appreciate recs: continued to diurese, wean HF to NC, sleep eval as outpatient BPH 
-Will hold home flomax for now 
  
 RAMIREZ- May have led to arrhythmia.  
-Not on CPAP at home, needs sleep eval on d/c 
  
FEN/GI -Cardiac diet Activity - Out of bed with assistance DVT prophylaxis - Heparin GI prophylaxis - Not indicated at this time Fall prophylaxis - Not indicated at this time. Disposition - Plan to d/c to Home. Code Status - Full, discussed with patient  
  
Patient will be discussed with Dr. Robson Barajas (Attending Physician) William Edwards MD 
Family Medicine Resident Subjective / Objective Subjective Patient resting comfortably on NC this morning. Reports he slept very well last night. He is tolerating a diet, urinating and had a bowel movement. He denied any chest pain, nausea or shortness of breath. Temp (24hrs), Av.7 °F (37.1 °C), Min:98 °F (36.7 °C), Max:99.5 °F (37.5 °C) Objective: 
Vital signs: (most recent): Blood pressure 114/68, pulse (!) 126, temperature 99.5 °F (37.5 °C), resp. rate 17, height 5' 11\" (1.803 m), weight 200 lb (90.7 kg), SpO2 95 %. Respiratory: O2 Flow Rate (L/min): 3 l/min O2 Device: Nasal cannula Visit Vitals /68 Pulse (!) 126 Temp 99.5 °F (37.5 °C) Resp 17 Ht 5' 11\" (1.803 m) Wt 200 lb (90.7 kg) SpO2 95% BMI 27.89 kg/m² General: No acute distress. Alert. Cooperative. Head: Normocephalic. Atraumatic. Eyes:              Conjunctiva pink. Sclera white. PERRL. Nose:             Septum midline. Mucosa pink. No drainage. Neck: Supple. Normal ROM. No stiffness. Respiratory: CTAB. On NC Cardiovascular: Irregular and tachycardic rhythm. Normal S1,S2. No m/r/g. Pulses 2+ throughout. GI: Nontender. No rebound tenderness or guarding. Nondistended. Extremities:  No LE edema. Distal pulses intact. Musculoskeletal: Full ROM in all extremities. Skin: Warm, dry. No rashes. I/O: 
Date 18 07 - 18 0676 18 - 18 4841 Shift 4665-8509 8436-7741 24 Hour Total 1904-1699 7341-9109 24 Hour Total  
INTAKE  
 P.O[address] 240  240     
  P. O. 240  240     
I. V.(mL/kg/hr) 30.4(0)  30.4(0) Cardizem Volume 15  15 Heparin Volume 15.4  15.4 Shift Total(mL/kg) 270.4(3)  270.4(3) OUTPUT Urine(mL/kg/hr) 4156(8.3) 850(0.8) 2900(1.3) Urine Voided 2050 850 2900 Shift Total(mL/kg) 5628(29.1) 850(9.4) 3286(64) NET -1779.6 -850 -2629.6 Weight (kg) 90.7 90.7 90.7 90.7 90.7 90.7 CBC: 
Recent Labs  
  11/28/18 
0616 11/27/18 0410 11/26/18 
0343 WBC 7.4 12.6* 6.1 HGB 12.8 13.8 11.8* HCT 40.6 43.4 37.6  175 149* Metabolic Panel: 
Recent Labs  
  11/28/18 0616 11/27/18 0410 11/26/18 
0343 11/25/18 
1658  138 138 139  
K 3.9 4.5 3.8 4.0  
 102 106 104 CO2 27 24 23 26 BUN 11 12 11 14 CREA 1.11 1.09 1.04 1.10 * 136* 102* 120* CA 8.4* 8.3* 7.7* 9.0 MG 2.1 2.1 2.4 2.1 ALB  --   --   --  3.7 SGOT  --   --   --  26 ALT  --   --   --  67 For Billing Chief Complaint Patient presents with  Irregular Heart Beat Hospital Problems  Date Reviewed: 8/8/2018 Codes Class Noted POA * (Principal) Atrial fibrillation with RVR ([facility] [address] ICD-10-CM: I48.91 
ICD-9-CM: 427.31  11/25/2018 Yes Hyperlipemia ICD-10-CM: E78.5 ICD-9-CM: 272.4  2/4/2011 Yes Pre-diabetes ICD-10-CM: R73.03 
ICD-9-CM: 790.29  1/6/2011 Yes RAMIREZ (obstructive sleep apnea) ICD-10-CM: G47.33 
ICD-9-CM: 327.23  5/17/2010 Yes Overview Signed 5/17/2010 10:47 AM by Jeronimo Ivory  
  cpap Seasonal allergic reaction ICD-10-CM: J30.2 ICD-9-CM: 477.9  5/17/2010 Yes Depression with anxiety ICD-10-CM: F41.8 ICD-9-CM: 300.4  5/17/2010 Yes [address] Medicine Residency Attending Addendum: 
 
I was NOT present with the resident during the interview & examination of the patient.  I personally interviewed the patient & repeated the critical or key portions of the exam.  I agree with the resident's note with the following additions: 
 
Hx:  has a past medical history of Anxiety, BPH, Depression, ED (erectile dysfunction), Hearing loss, Herniated disc, Hypogonadism, and Sleep apnea. CC: Denies CP, SOB Exam:  
VS:  
Patient Vitals for the past 4 hrs: 
 Temp Pulse Resp BP SpO2  
11/28/18 1101 99 °F (37.2 °C) (!) 129 26 157/82 94 % 11/28/18 1000  (!) 135 22 122/88 95 % GEN: NAD, reclining in bed comfortably CV: Tachycardia, irregular rhythm Lungs:  No respiratory distress, NC in place Assessment/Plan: 1.  Aflutter with RVR: HR up to 120-130s again, back on diltiazem gtt. Cardiology following, appreciate recs. Unsuccessful DCCV and medical cardioversion, planned for DCCV in EP lab and possible AF ablation on 11/29. On PO metoprolol, digoxin and amio per cardiology. 2. Pleuritic, atypical chest pain: Troponin neg since admission. CP improved. Echo with new WMA and decreased LV function. [facility] 11/27 neg for CAD. 3.  Acute HFrEF: ProBNP elevated and echo reveals LVEF 20% with mild LA and RA dilation. Required Bipap and IV diuresis on 11/26 overnight. No resp distress today on NC. Strict I/Os, daily weights. Prn diuretics. 4. Untreated CPAP: Pulm consulted, apprec help arranging outpatient sleep evaluation. 
  
 
See resident note for detailed assessment and plan. Regina Echevarria MD  
Pt seen and examined on 11/28/2018 Principal Problem: 
  Atrial fibrillation with RVR ([address] (11/25/2018) Active Problems: 
  RAMIREZ (obstructive sleep apnea) (5/17/2010) Overview: cpap Seasonal allergic reaction (5/17/2010) Depression with anxiety (5/17/2010) Pre-diabetes (1/6/2011) Hyperlipemia (2/4/2011)

## 2018-11-28 NOTE — PROGRESS NOTES
[facility] Name: Aries Slater III MRN: [id number] : 1955 Hospital: [address] Date: 2018 Impression Plan 1. Afib/flutter with RVR 2. Acute hypoxic respiratory failure 3. RAMIREZ, untreated 4. Systolic heart failure · Cardiology following · Failed cardioversion, planned to undergo ablation on Thursday · DIgoxin, amiodarone, metoprolol · Dilt drip if needed · Heparin drip · Cardiac cath without coronary artery disease · Goal sats >90%, · Will set up with sleep eval as an outpatient when he is ready for discharge · Cardiac diet, NPO at midnight Radiology ( personally reviewed) CTA chest: no PE, aortic aneurysm or dissection, lungs clear, small pleural effusion CXR without edema, airspace disease ABG No results for input(s): PHI, PO2I, PCO2I in the last 72 hours. Subjective Back in afib overnight. Given digoxin and to be loaded with amiodarone. Satting 98% on 3L, weaned to 2L. Denies shortness of breath, chest pain, f/c. Review of Systems: 
Review of systems not obtained due to patient factors. Past Medical History:  
Diagnosis Date  Anxiety  BPH  Depression  ED (erectile dysfunction)  Hearing loss  Herniated disc  Hypogonadism  Sleep apnea Past Surgical History:  
Procedure Laterality Date  ECHO STRESS    
 HX HERNIA REPAIR    
 HX ORTHOPAEDIC    
 knee surgery Prior to Admission medications Medication Sig Start Date End Date Taking? Authorizing Provider  
tamsulosin (FLOMAX) 0.4 mg capsule Take 0.4 mg by mouth daily as needed (trouble urinating). Yes Provider, Historical  
ibuprofen (MOTRIN) 200 mg tablet Take 200 mg by mouth every eight (8) hours as needed for Pain. Yes Provider, Historical  
 
Current Facility-Administered Medications Medication Dose Route Frequency  dilTIAZem (CARDIZEM) 125 mg in dextrose 5% 125 mL infusion  0-15 mg/hr IntraVENous TITRATE  [START ON 11/29/2018] digoxin (LANOXIN) tablet 0.125 mg  0.125 mg Oral DAILY  amiodarone (CORDARONE) tablet 400 mg  400 mg Oral TID  [START ON 12/1/2018] amiodarone (CORDARONE) tablet 400 mg  400 mg Oral DAILY  metoprolol succinate (TOPROL-XL) XL tablet 50 mg  50 mg Oral DAILY  heparin 25,000 units in D5W 250 ml infusion  11-25 Units/kg/hr IntraVENous TITRATE  sodium chloride (NS) flush 5-10 mL  5-10 mL IntraVENous Q8H No Known Allergies Social History Tobacco Use  Smoking status: Former Smoker  Smokeless tobacco: Never Used Substance Use Topics  Alcohol use: Yes Alcohol/week: 3.0 oz Types: 6 Cans of beer per week Comment: social  
  
Family History Problem Relation Age of Onset  Cancer Mother  Diabetes Father  Hypertension Father  Dementia Father  Cancer Sister Laboratory: I have personally reviewed the critical care flowsheet and labs. Recent Labs  
  11/28/18 
0616 11/27/18 
0410 11/26/18 
0343 WBC 7.4 12.6* 6.1 HGB 12.8 13.8 11.8* HCT 40.6 43.4 37.6  175 149* Recent Labs  
  11/28/18 
0616 11/27/18 
0410 11/26/18 
0343 11/25/18 
1658  138 138 139  
K 3.9 4.5 3.8 4.0  
 102 106 104 CO2 27 24 23 26 * 136* 102* 120* BUN 11 12 11 14 CREA 1.11 1.09 1.04 1.10 CA 8.4* 8.3* 7.7* 9.0 MG 2.1 2.1 2.4 2.1 ALB  --   --   --  3.7 SGOT  --   --   --  26 ALT  --   --   --  67 Objective: Mode Rate Tidal Volume Pressure FiO2 PEEP  
         60 % Vital Signs:   
 TMAX(24) Intake/Output:  
Last shift:      Ventilator Volumes Vt Spont (ml): 712 ml Ve Observed (l/min): 17.8 l/min Last 3 shifts: No intake/output data recorded. Manav Half Intake/Output Summary (Last 24 hours) at 11/28/2018 1016 Last data filed at 11/28/2018 0041 Gross per 24 hour Intake 240 ml Output 2050 ml Net -1810 ml EXAM: 
 GENERAL: well developed and in no distress, HEENT:  PERRL, EOMI, no alar flaring or epistaxis, oral mucosa moist without cyanosis, NECK:  no jugular vein distention, no retractions, no thyromegaly or masses, LUNGS: scattered crackles, HEART:  Irregularly irregular, with no MGR; no edema is present, ABDOMEN:  soft with no tenderness, bowel sounds present, EXTREMITIES:  warm with no cyanosis, SKIN:  no jaundice or ecchymosis and NEUROLOGIC:  alert and oriented, grossly non-focal 
 
Chip Goldberg MD 
[facility] [place]

## 2018-11-28 NOTE — PROGRESS NOTES
Cardiology Progress Note [address] [facility]. [address], [place], [address]Hel[facility] Phone [phone]; Fax [phone] 
 
 
 
2018 9:18 AM  
 
Admit Date:           2018 Admit Diagnosis:  Atrial fibrillation with RVR ([address] :          1955 MRN:          [id number] ASSESSMENT/RECOMMENDATION:  
Atrial fibrillation/flutter with rapid ventricular response: was off cardizem gtt since yesterday AM, rate controlled w/ Toprol, however patient back in A fib/flutter with RVR overnight V rate 130-150's. Did have PO digoxin last night. Will give IV digoxin now and restart cardizem gtt if needed.  
-spoke with Dr Bernard Pelaez - will load w PO amiodarone starting today 
-Unsuccessful cardioversion with low dose of 100 J. CHARLIE showed no DEVENDRA thrombus. DCCV 18 with 360 J was not successful. 
- cont heparin gtt, [id number]=5 will need long term [facility]- plan to start post aflutter ablation -TSH WNL 
-K and Mag okay 
-Aflutter ablation planned for Thursday? Npo after midnight Non-ischemic CM: cardiac cath shows no CAD Acute on chronic CHF NYHA class III, new onset: EF 25% per echo.  
-no GDMT right now d/t hypotension 
-PO digoxin to restart tomorrow  
-flash pulmonary edema  - off high flow O2 on NC now Chest pain: resolved. -The pleuritic chest pain is of unclear etiology, suspect this is pericarditis. Would avoid NSAIDs in the setting of acute CHF/starting OAC. Can use Toradol.  
-chest CTA negative for aortic aneurysm or dissection. 
- Le dopplers negative Sleep apnea: Currently does not use CPAP for last many years. Need to encourage him to start using his CPAP again. 
-follow up with sleep clinic OP Reviewed above plan with the patient and attending Cardiology Attending: Patient personally seen and examined. All the elements of history and examination were personally performed. Assessment and plan was discussed and agree as written above. - Started on Amio per recommendations of Dr. Mary Bennett.  
- for DCCV by Dr. Mary Bennett in EP lab tomorrow and possible flutter ablation. He will decide while in EP lab. Fred Roque MD, [facility] No intake/output data recorded. Last 3 Recorded Weights in this Encounter 11/25/18 1645 Weight: 200 lb (90.7 kg)  
 
 
 
11/26 1901 - 11/28 0700 In: 699.2 [P.O.:240; I.V.:459.2] Out: 90[address] [[id number]] SUBJECTIVE Overnight events: A fib with RVR overnight V rate 130-150's. Arlin Prince III had a better night, denies SOB, CP or palpitations. Wants to take a shower Current Facility-Administered Medications Medication Dose Route Frequency  dilTIAZem (CARDIZEM) 125 mg in dextrose 5% 125 mL infusion  0-15 mg/hr IntraVENous TITRATE  digoxin (LANOXIN) injection 250 mcg  250 mcg IntraVENous ONCE  
 metoprolol succinate (TOPROL-XL) XL tablet 50 mg  50 mg Oral DAILY  PHENYLephrine (CHAVA-SYNEPHRINE) injection 0.1-0.5 mg  100-500 mcg IntraVENous Multiple  heparin 25,000 units in D5W 250 ml infusion  11-25 Units/kg/hr IntraVENous TITRATE  acetaminophen (TYLENOL) tablet 500 mg  500 mg Oral Q4H PRN  prochlorperazine (COMPAZINE) with saline injection 5 mg  5 mg IntraVENous Q6H PRN  
 sodium chloride (NS) flush 5-10 mL  5-10 mL IntraVENous Q8H  
 sodium chloride (NS) flush 5-10 mL  5-10 mL IntraVENous PRN OBJECTIVE Intake/Output Summary (Last 24 hours) at 11/28/2018 5028 Last data filed at 11/28/2018 0041 Gross per 24 hour Intake 240 ml Output 2750 ml Net -2510 ml Review of Systems - History obtained from the patient AS PER  HPI Telemetry A fib/flutter rate 130-150's PHYSICAL EXAM  
  
 
Visit Vitals /68 Pulse (!) 126 Temp 99.5 °F (37.5 °C) Resp 17  
 Ht 5' 11\" (1.803 m) Wt 200 lb (90.7 kg) SpO2 95% BMI 27.89 kg/m² Gen: Well-developed, well-nourished, in no acute distress  alert and oriented x 3 HEENT:  Pink conjunctivae, Hearing grossly normal.No scleral icterus or conjunctival, moist mucous membranes Neck: Supple,JVD Resp: On NC- diminished bases Card: Irregular /accelerated Rate,Rythm,No murmurs, rubs or gallop. No thrills. GI:           distended , non-tender MSK: No cyanosis or clubbing, good capillary refill Skin: No rashes or ulcers, no bruising. Right radial site covered with CDI, +2 radial pulses Neuro:  Cranial nerves are grossly intact, moving all four extremities, no focal deficit, follows commands appropriately Psych:  Good insight, oriented to person, place and time, alert, Nml Affect LE: No edema DATA REVIEW Laboratory and Imaging have been reviewed by me and are notable for Recent Labs  
  11/26/18 
2148 11/26/18 
1530 11/26/18 
0343 11/25/18 
2224 CPK  --   --   --  39  
CKMB  --   --   --  <1.0  
TROIQ <0.05 <0.05 <0.05 <0.05 Recent Labs  
  11/28/18 
0616 11/27/18 
0410 11/26/18 
0343  138 138  
K 3.9 4.5 3.8 CO2 27 24 23 BUN 11 12 11 CREA 1.11 1.09 1.04 * 136* 102* MG 2.1 2.1 2.4 WBC 7.4 12.6* 6.1 HGB 12.8 13.8 11.8* HCT 40.6 43.4 37.6  175 149* Mars Guy, MELINA

## 2018-11-28 NOTE — PROGRESS NOTES
The plan is for cardioversion and/or cardiac ablation tomorrow. If procedure  is successful,then perhaps pt will be discharged home tomorrow with H2h CHF through [facility]. Margie Roman

## 2018-11-28 NOTE — CARDIO/PULMONARY
Cardiac Rehab: 11/28/2018 @ 8944:  71 y/o male admitted 11/26/18 with CP. Dx: Afib/flutter/RVR per Dr. Estefani Woodruff H&P. Troponins peak 0.05. pBNP->2600. Echo done 11`/26/18 showed EF of 20%. Cardioversion 11/26/18 unsuccessful. Pt was on Cardizem drip and changed to IV digoxin. Remains in afib/flutter 1340's. Pt scheduled for ablation on 11/29/18. S/P cardiac cath 11/27/18 with normal coronaries. Living with Heart Failure and Afib Booklet given to [facility]. Prior to admission cardiac meds: None New cardiac med: Metoprololol Smoking history assessed. Patient is a Non-moker. Met with pt sitting up in bed on IVCU. Discussed diagnosis definition and assessed patient understanding. Pt is aware of afib/flutter, normal cath and need for ablation. His main question was \"Where did this come from. \"  Pt reported he \"tought his RAMIREZ was cured and had not been using CPAP. Reviewed RAMIREZ affect on cardiac issues and importance of using CPAP and F/U with MD's. Reviewed importance of daily weight monitoring, exercise, med compliance and Low Sodium diet (7379-9924 mg. daily). Reviewed new med-metoprolol- purpose of medication, potential side effects, compliance, and what to do if dose is missed. Discussed importance of reporting signs and symptoms of exacerbation, and when to report them to the doctor, to prevent re-hospitalization. [facility] was encouraged to keep all appointments with doctor. Discussed the Cardiac Rehab Program, benefits, format, and encouraged enrollment, when eligible. Patient is interested and we will follow up, by phone, once eligible.  
Rodger Pastrana RN

## 2018-11-29 LAB
ANION GAP SERPL CALC-SCNC: 9 MMOL/L (ref 5–15)
APTT PPP: 68 SEC (ref 22.1–32)
BUN SERPL-MCNC: 10 MG/DL (ref 6–20)
BUN/CREAT SERPL: 10 (ref 12–20)
CALCIUM SERPL-MCNC: 8.7 MG/DL (ref 8.5–10.1)
CHLORIDE SERPL-SCNC: 105 MMOL/L (ref 97–108)
CO2 SERPL-SCNC: 26 MMOL/L (ref 21–32)
CREAT SERPL-MCNC: 1.03 MG/DL (ref 0.7–1.3)
ERYTHROCYTE [DISTWIDTH] IN BLOOD BY AUTOMATED COUNT: 13.3 % (ref 11.5–14.5)
GLUCOSE SERPL-MCNC: 105 MG/DL (ref 65–100)
HCT VFR BLD AUTO: 40.9 % (ref 36.6–50.3)
HGB BLD-MCNC: 13.1 G/DL (ref 12.1–17)
MAGNESIUM SERPL-MCNC: 1.9 MG/DL (ref 1.6–2.4)
MCH RBC QN AUTO: 26.8 PG (ref 26–34)
MCHC RBC AUTO-ENTMCNC: 32 G/DL (ref 30–36.5)
MCV RBC AUTO: 83.6 FL (ref 80–99)
NRBC # BLD: 0 K/UL (ref 0–0.01)
NRBC BLD-RTO: 0 PER 100 WBC
PLATELET # BLD AUTO: 188 K/UL (ref 150–400)
PMV BLD AUTO: 10.4 FL (ref 8.9–12.9)
POTASSIUM SERPL-SCNC: 3.9 MMOL/L (ref 3.5–5.1)
RBC # BLD AUTO: 4.89 M/UL (ref 4.1–5.7)
SODIUM SERPL-SCNC: 140 MMOL/L (ref 136–145)
THERAPEUTIC RANGE,PTTT: ABNORMAL SECS (ref 58–77)
WBC # BLD AUTO: 5.9 K/UL (ref 4.1–11.1)

## 2018-11-29 PROCEDURE — 74011250637 HC RX REV CODE- 250/637: Performed by: INTERNAL MEDICINE

## 2018-11-29 PROCEDURE — 83735 ASSAY OF MAGNESIUM: CPT

## 2018-11-29 PROCEDURE — 74011000250 HC RX REV CODE- 250: Performed by: INTERNAL MEDICINE

## 2018-11-29 PROCEDURE — 93325 DOPPLER ECHO COLOR FLOW MAPG: CPT

## 2018-11-29 PROCEDURE — 74011000250 HC RX REV CODE- 250: Performed by: NURSE PRACTITIONER

## 2018-11-29 PROCEDURE — 36415 COLL VENOUS BLD VENIPUNCTURE: CPT

## 2018-11-29 PROCEDURE — 74011250636 HC RX REV CODE- 250/636: Performed by: INTERNAL MEDICINE

## 2018-11-29 PROCEDURE — 85730 THROMBOPLASTIN TIME PARTIAL: CPT

## 2018-11-29 PROCEDURE — 92960 CARDIOVERSION ELECTRIC EXT: CPT

## 2018-11-29 PROCEDURE — 77030018729 HC ELECTRD DEFIB PAD CARD -B

## 2018-11-29 PROCEDURE — 74011000258 HC RX REV CODE- 258: Performed by: NURSE PRACTITIONER

## 2018-11-29 PROCEDURE — 65660000000 HC RM CCU STEPDOWN

## 2018-11-29 PROCEDURE — 5A2204Z RESTORATION OF CARDIAC RHYTHM, SINGLE: ICD-10-PCS | Performed by: INTERNAL MEDICINE

## 2018-11-29 PROCEDURE — 85027 COMPLETE CBC AUTOMATED: CPT

## 2018-11-29 PROCEDURE — 74011250637 HC RX REV CODE- 250/637: Performed by: NURSE PRACTITIONER

## 2018-11-29 PROCEDURE — 80048 BASIC METABOLIC PNL TOTAL CA: CPT

## 2018-11-29 PROCEDURE — B246ZZ4 ULTRASONOGRAPHY OF RIGHT AND LEFT HEART, TRANSESOPHAGEAL: ICD-10-PCS | Performed by: INTERNAL MEDICINE

## 2018-11-29 PROCEDURE — 74011250636 HC RX REV CODE- 250/636: Performed by: NURSE PRACTITIONER

## 2018-11-29 RX ORDER — FENTANYL CITRATE 50 UG/ML
25-200 INJECTION, SOLUTION INTRAMUSCULAR; INTRAVENOUS
Status: DISCONTINUED | OUTPATIENT
Start: 2018-11-29 | End: 2018-11-29 | Stop reason: HOSPADM

## 2018-11-29 RX ORDER — AMIODARONE HYDROCHLORIDE 400 MG/1
400 TABLET ORAL DAILY
Qty: 30 TAB | Refills: 0 | Status: SHIPPED | OUTPATIENT
Start: 2018-12-01 | End: 2019-01-02 | Stop reason: SDUPTHER

## 2018-11-29 RX ORDER — LIDOCAINE HYDROCHLORIDE 20 MG/ML
15 SOLUTION OROPHARYNGEAL AS NEEDED
Status: DISCONTINUED | OUTPATIENT
Start: 2018-11-29 | End: 2018-11-29 | Stop reason: HOSPADM

## 2018-11-29 RX ORDER — DIPHENHYDRAMINE HYDROCHLORIDE 50 MG/ML
25-50 INJECTION, SOLUTION INTRAMUSCULAR; INTRAVENOUS ONCE
Status: COMPLETED | OUTPATIENT
Start: 2018-11-29 | End: 2018-11-29

## 2018-11-29 RX ORDER — MAGNESIUM SULFATE HEPTAHYDRATE 40 MG/ML
2 INJECTION, SOLUTION INTRAVENOUS ONCE
Status: COMPLETED | OUTPATIENT
Start: 2018-11-29 | End: 2018-11-29

## 2018-11-29 RX ORDER — MIDAZOLAM HYDROCHLORIDE 1 MG/ML
.5-1 INJECTION, SOLUTION INTRAMUSCULAR; INTRAVENOUS
Status: DISCONTINUED | OUTPATIENT
Start: 2018-11-29 | End: 2018-11-29 | Stop reason: HOSPADM

## 2018-11-29 RX ORDER — DIGOXIN 125 MCG
0.12 TABLET ORAL DAILY
Qty: 30 TAB | Refills: 0 | Status: SHIPPED | OUTPATIENT
Start: 2018-11-30 | End: 2018-12-05

## 2018-11-29 RX ORDER — LIDOCAINE 50 MG/G
OINTMENT TOPICAL AS NEEDED
Status: DISCONTINUED | OUTPATIENT
Start: 2018-11-29 | End: 2018-11-29 | Stop reason: HOSPADM

## 2018-11-29 RX ORDER — AMIODARONE HYDROCHLORIDE 400 MG/1
400 TABLET ORAL 3 TIMES DAILY
Qty: 4 TAB | Refills: 0 | Status: SHIPPED | OUTPATIENT
Start: 2018-11-29 | End: 2018-12-05

## 2018-11-29 RX ORDER — METOPROLOL SUCCINATE 50 MG/1
50 TABLET, EXTENDED RELEASE ORAL DAILY
Qty: 30 TAB | Refills: 0 | Status: SHIPPED | OUTPATIENT
Start: 2018-11-30 | End: 2019-01-02 | Stop reason: SDUPTHER

## 2018-11-29 RX ORDER — LIDOCAINE HYDROCHLORIDE 20 MG/ML
JELLY TOPICAL ONCE
Status: COMPLETED | OUTPATIENT
Start: 2018-11-29 | End: 2018-11-29

## 2018-11-29 RX ADMIN — FENTANYL CITRATE 25 MCG: 50 INJECTION, SOLUTION INTRAMUSCULAR; INTRAVENOUS at 11:24

## 2018-11-29 RX ADMIN — AMIODARONE HYDROCHLORIDE 400 MG: 200 TABLET ORAL at 16:57

## 2018-11-29 RX ADMIN — DIPHENHYDRAMINE HYDROCHLORIDE 50 MG: 50 INJECTION, SOLUTION INTRAMUSCULAR; INTRAVENOUS at 11:11

## 2018-11-29 RX ADMIN — LIDOCAINE HYDROCHLORIDE: 20 JELLY TOPICAL at 11:20

## 2018-11-29 RX ADMIN — BENZOCAINE 1 SPRAY: 200 SPRAY DENTAL; ORAL; PERIODONTAL at 11:15

## 2018-11-29 RX ADMIN — MIDAZOLAM 1 MG: 1 INJECTION INTRAMUSCULAR; INTRAVENOUS at 11:25

## 2018-11-29 RX ADMIN — HEPARIN SODIUM AND DEXTROSE 24 UNITS/KG/HR: 10000; 5 INJECTION INTRAVENOUS at 05:05

## 2018-11-29 RX ADMIN — METOPROLOL SUCCINATE 50 MG: 50 TABLET, FILM COATED, EXTENDED RELEASE ORAL at 08:00

## 2018-11-29 RX ADMIN — Medication 10 ML: at 13:23

## 2018-11-29 RX ADMIN — MIDAZOLAM 2 MG: 1 INJECTION INTRAMUSCULAR; INTRAVENOUS at 11:21

## 2018-11-29 RX ADMIN — MAGNESIUM SULFATE HEPTAHYDRATE 2 G: 40 INJECTION, SOLUTION INTRAVENOUS at 09:31

## 2018-11-29 RX ADMIN — LIDOCAINE: 50 OINTMENT TOPICAL at 11:16

## 2018-11-29 RX ADMIN — LIDOCAINE HYDROCHLORIDE 15 ML: 20 SOLUTION ORAL; TOPICAL at 11:06

## 2018-11-29 RX ADMIN — FENTANYL CITRATE 25 MCG: 50 INJECTION, SOLUTION INTRAMUSCULAR; INTRAVENOUS at 11:21

## 2018-11-29 RX ADMIN — APIXABAN 5 MG: 5 TABLET, FILM COATED ORAL at 21:05

## 2018-11-29 RX ADMIN — LIDOCAINE: 50 OINTMENT TOPICAL at 11:09

## 2018-11-29 RX ADMIN — AMIODARONE HYDROCHLORIDE 400 MG: 200 TABLET ORAL at 21:05

## 2018-11-29 RX ADMIN — APIXABAN 5 MG: 5 TABLET, FILM COATED ORAL at 13:20

## 2018-11-29 RX ADMIN — DILTIAZEM HYDROCHLORIDE 5 MG/HR: 5 INJECTION INTRAVENOUS at 03:38

## 2018-11-29 RX ADMIN — DIGOXIN 0.12 MG: 125 TABLET ORAL at 08:05

## 2018-11-29 RX ADMIN — Medication 10 ML: at 05:07

## 2018-11-29 RX ADMIN — AMIODARONE HYDROCHLORIDE 400 MG: 200 TABLET ORAL at 08:01

## 2018-11-29 RX ADMIN — MIDAZOLAM 1 MG: 1 INJECTION INTRAMUSCULAR; INTRAVENOUS at 11:23

## 2018-11-29 NOTE — PROGRESS NOTES
[address] [facility] [address] with [address] [facility] Resident Off Service Note Anette Leon is a 70 y.o. male with PMH of BPH, RAMIREZ non compliant with CPAP who presented to the ER complaining of chest pain and was found to have A. Fib with RVR on EKG. Patient's heart rate remained elevated and was not controlled on Digoxin, Cardizem and Amiodarone. Echo on this admission showed an EF 20%. Patient developed shortness of breath on the evening of 11/27 requiring BIPAP. CTA was negative for PE and BLE duplex negative for a DVT. A chest xray showed pulmonary edema and small pleural effusions. Patient improved following diuresis and weaned off oxygen within the day. He continues to use nasal cannula as needed at night and will require a sleep study follow up as outpatient for RAMIREZ. Cardiology attempted cardioversion 3 times but were unsuccessful in converting the patient's heart rate to normal sinus rhythm. Cardiac MRI performed on 11/30, results pending at this time. Patient will be transferred to [facility] for ablation early next week. Transfer center contacted on 11/30 and I spoke with hospitalist Dr. Abeba Love. [facility] is currently full but they anticipate transfer on Sunday 12/02. Please see daily progress note for detailed plan. Terrance Negrete MD 
Family Medicine Resident

## 2018-11-29 NOTE — PROGRESS NOTES
[facility] Name: Beau West III MRN: [id number] : 1955 Hospital: [address] Date: 2018 Impression Plan 1. Afib/flutter with RVR 2. Acute hypoxic respiratory failure 3. RAMIREZ, untreated 4. Systolic heart failure · Cardiology following · Failed repeated cardioversions, planned to undergo ablation next week at [facility] · Cardiac MRI · Digoxin, amiodarone, metoprolol · Dilt drip if needed · Heparin drip · Cardiac cath without coronary artery disease · Goal sats >90%, · Will set up with sleep eval as an outpatient when he is ready for discharge · Cardiac diet Radiology ( personally reviewed) CTA chest: no PE, aortic aneurysm or dissection, lungs clear, small pleural effusion CXR without edema, airspace disease ABG No results for input(s): PHI, PO2I, PCO2I in the last 72 hours. Subjective Underwent attempt at DCCV today, initially went into sinus rhythm but ultimately afib recurred. Was on RA prior to procedure, placed on O2 for the procedure and remains on while sleeping. Review of Systems: 
Review of systems not obtained due to patient factors. Past Medical History:  
Diagnosis Date  Anxiety  BPH  Depression  ED (erectile dysfunction)  Hearing loss  Herniated disc  Hypogonadism  Sleep apnea Past Surgical History:  
Procedure Laterality Date  ECHO STRESS    
 HX HERNIA REPAIR    
 HX ORTHOPAEDIC    
 knee surgery Prior to Admission medications Medication Sig Start Date End Date Taking? Authorizing Provider  
amiodarone (PACERONE) 400 mg tablet Take 1 Tab by mouth three (3) times daily. 18  Yes Tesha Jean Baptiste MD  
amiodarone (PACERONE) 400 mg tablet Take 1 Tab by mouth daily. 18  Yes Tesha Jean Baptiste MD  
apixaban (ELIQUIS) 5 mg tablet Take 1 Tab by mouth two (2) times a day.  18  Yes Tesha Jean Baptiste MD  
 digoxin (LANOXIN) 0.125 mg tablet Take 1 Tab by mouth daily. 11/30/18  Yes Rod Case MD  
dilTIAZem [facility]) infusion 0-15 mg/hr by IntraVENous route TITRATE. 11/29/18  Yes Rod Case MD  
metoprolol succinate (TOPROL-XL) 50 mg XL tablet Take 1 Tab by mouth daily. 11/30/18  Yes Rod Case MD  
prochlorperazine 5 mg/mL soln 5 mg 5 mg by IntraVENous route every six (6) hours as needed for Nausea or Vomiting for up to 1 day. 11/29/18 11/30/18 Yes Rod Case MD  
tamsulosin (FLOMAX) 0.4 mg capsule Take 0.4 mg by mouth daily as needed (trouble urinating). Yes Provider, Historical  
ibuprofen (MOTRIN) 200 mg tablet Take 200 mg by mouth every eight (8) hours as needed for Pain. Yes Provider, Historical  
 
Current Facility-Administered Medications Medication Dose Route Frequency  apixaban (ELIQUIS) tablet 5 mg  5 mg Oral BID  dilTIAZem (CARDIZEM) 125 mg in dextrose 5% 125 mL infusion  0-15 mg/hr IntraVENous TITRATE  digoxin (LANOXIN) tablet 0.125 mg  0.125 mg Oral DAILY  amiodarone (CORDARONE) tablet 400 mg  400 mg Oral TID  [START ON 12/1/2018] amiodarone (CORDARONE) tablet 400 mg  400 mg Oral DAILY  metoprolol succinate (TOPROL-XL) XL tablet 50 mg  50 mg Oral DAILY  sodium chloride (NS) flush 5-10 mL  5-10 mL IntraVENous Q8H No Known Allergies Social History Tobacco Use  Smoking status: Former Smoker  Smokeless tobacco: Never Used Substance Use Topics  Alcohol use: Yes Alcohol/week: 3.0 oz Types: 6 Cans of beer per week Comment: social  
  
Family History Problem Relation Age of Onset  Cancer Mother  Diabetes Father  Hypertension Father  Dementia Father  Cancer Sister Laboratory: I have personally reviewed the critical care flowsheet and labs. Recent Labs  
  11/29/18 
0330 11/28/18 
0616 11/27/18 
0410 WBC 5.9 7.4 12.6* HGB 13.1 12.8 13.8 HCT 40.9 40.6 43.4  181 175 Recent Labs  
  11/29/18 0330 11/28/18 
0616 11/27/18 
0410  139 138  
K 3.9 3.9 4.5  
 104 102 CO2 26 27 24 * 104* 136* BUN 10 11 12 CREA 1.03 1.11 1.09  
CA 8.7 8.4* 8.3*  
MG 1.9 2.1 2.1 Objective: Mode Rate Tidal Volume Pressure FiO2 PEEP  
         60 % Vital Signs:   
 TMAX(24) Intake/Output:  
Last shift:      Ventilator Volumes Vt Spont (ml): 712 ml Ve Observed (l/min): 17.8 l/min Last 3 shifts: No intake/output data recorded. Jaclyn Tavares Intake/Output Summary (Last 24 hours) at 11/29/2018 1259 Last data filed at 11/29/2018 6861 Gross per 24 hour Intake  Output 750 ml Net -750 ml EXAM: 
GENERAL: well developed and in no distress, HEENT:  PERRL, EOMI, no alar flaring or epistaxis, oral mucosa moist without cyanosis, NECK:  no jugular vein distention, no retractions, no thyromegaly or masses, LUNGS: scattered crackles, HEART:  Irregularly irregular, with no MGR; no edema is present, ABDOMEN:  soft with no tenderness, bowel sounds present, EXTREMITIES:  warm with no cyanosis, SKIN:  no jaundice or ecchymosis and NEUROLOGIC:  alert and oriented, grossly non-focal 
 
Roya Iniguez MD 
[facility] [place]

## 2018-11-29 NOTE — PROGRESS NOTES
Samira Schmid [address], [address] Office [phone] Fax [phone] Assessment and Plan Aydee Brewster is a 70 y.o. male admitted for atrial flutter with RVR. He has spent 4 night(s) in the hospital. 
 
24 Hour Events: weaned of oxygen. Started on PO amiodarone. Patient still on Cardizem drip. Atrial flutter with RVR in setting of hypotension: Unclear etiology; possibly 2/2 untreated RAMIREZ. CTA shows small pleural effusions but negative for aortic dissection, PE, or pericardial effusion. TSH wnl and UDS neg. BNP 2639 and CK-MB wnl. S/p failed cardioversion and [place]. BL LE Doppler: negative. Echo with LVEF 20% 
- CHARLIE: Left atrial appendage: No mass was identified. 
- Continue Heparin drip 
- Continue Metoprolol 50mg daily and Digoxin 0.25mg daily 
- Continue diltiazem gtt - Cariology following, appreciate recs: Cath negative for CAD. Plan for DCCV in EP lab and possible AF ablation today - Plan for Eliquis after 11/29 procedure 
  
Acute HFrEF: Newly detected this admission. ProBNP: 2639 (elevated). Echo reveals LVEF 20% with mild LA and RA dilation.  
-  Monitor fluid status closely. Strict I/Os, daily weights.  
- Continue BB, not on ACEi Pleuritic Chest Pain: Arrhythmia vs GERD. ACS less likely given neg trop throughout admission. Received Fentanyl x1 but now avoiding due to low BPs. Received Toradol x2 and GI cocktail.  
-Continue Tylenol as needed Acute hypoxic respiratory failure 2/2 pulmonary edema: Improving, weaned from bipap to high flow to now NC. D-dimer: 1.4 (H), initial CTA neg for PE and on heparin gtt. BLE duplex neg for DVT. ABG: pH 7.44, pCO2 32 pO2 84. CXR: pulmonary edema and small pleural effusions - Wean O2 as tolerated to keep sats > 90% - Will diurese prn - Pulm following, appreciate recs: sleep eval as outpatient BPH 
-Will hold home flomax for now 
  
RAMIREZ- May have led to arrhythmia. -Not on CPAP at home, needs sleep eval on d/c 
  
FEN/GI - NPO Activity - Out of bed with assistance DVT prophylaxis - Heparin GI prophylaxis - Not indicated at this time Fall prophylaxis - Not indicated at this time. Disposition - Plan to d/c to Home. Code Status - Full, discussed with patient  
  
Patient will be discussed with Dr. Ramone Berman (Attending Physician) Jazmin Ivan MD 
Family Medicine Resident Subjective / Objective Subjective Patient resting comfortably but feeling anxious about his procedure this morning. He has ambulated to the bedside commode. He denied any chest pain, nausea or shortness of breath. Temp (24hrs), Av.5 °F (36.9 °C), Min:98.4 °F (36.9 °C), Max:99 °F (37.2 °C) Objective: 
Vital signs: (most recent): Blood pressure 116/83, pulse (!) 112, temperature 99 °F (37.2 °C), resp. rate 23, height 5' 11\" (1.803 m), weight 200 lb (90.7 kg), SpO2 96 %. Respiratory: O2 Flow Rate (L/min): 2 l/min O2 Device: Room air Visit Vitals /66 Pulse (!) 110 Temp 98.4 °F (36.9 °C) Resp 19 Ht 5' 11\" (1.803 m) Wt 200 lb (90.7 kg) SpO2 95% BMI 27.89 kg/m² General: No acute distress. Alert. Cooperative. Head: Normocephalic. Atraumatic. Eyes:              Conjunctiva pink. Sclera white. PERRL. Nose:             Septum midline. Mucosa pink. No drainage. Neck: Supple. Normal ROM. No stiffness. Respiratory: CTAB. On NC Cardiovascular: Irregular and tachycardic rhythm. Normal S1,S2. No m/r/g. Pulses 2+ throughout. GI: Nontender. No rebound tenderness or guarding. Nondistended. Extremities:  No LE edema. Distal pulses intact. Musculoskeletal: Full ROM in all extremities. Skin: Warm, dry. No rashes. I/O: 
Date 18 0700 - 18 5648 18 07 - 18 8093 Shift 7253-6223 3231-6076 24 Hour Total 3108-8977 5610-4309 24 Hour Total  
INTAKE  
P.O. 240  240     
  P. O. 240  240 Shift Total(mL/kg) 240(2.6)  240(2.6) OUTPUT Urine(mL/kg/hr)  750 750 Urine Voided  750 750 Shift Total(mL/kg)  750(8.3) 750(8.3)  -750 -510 Weight (kg) 90.7 90.7 90.7 90.7 90.7 90.7 CBC: 
Recent Labs  
  11/29/18 
0330 11/28/18 
0616 11/27/18 
0410 WBC 5.9 7.4 12.6* HGB 13.1 12.8 13.8 HCT 40.9 40.6 43.4  181 175 Metabolic Panel: 
Recent Labs  
  11/29/18 
0330 11/28/18 
0616 11/27/18 
0410  139 138  
K 3.9 3.9 4.5  
 104 102 CO2 26 27 24 BUN 10 11 12 CREA 1.03 1.11 1.09  
* 104* 136* CA 8.7 8.4* 8.3*  
MG 1.9 2.1 2.1 For Billing Chief Complaint Patient presents with  Irregular Heart Beat Hospital Problems  Date Reviewed: 8/8/2018 Codes Class Noted POA * (Principal) Atrial fibrillation with RVR ([facility] [address] ICD-10-CM: I48.91 
ICD-9-CM: 427.31  11/25/2018 Yes Hyperlipemia ICD-10-CM: E78.5 ICD-9-CM: 272.4  2/4/2011 Yes Pre-diabetes ICD-10-CM: R73.03 
ICD-9-CM: 790.29  1/6/2011 Yes RAMIREZ (obstructive sleep apnea) ICD-10-CM: G47.33 
ICD-9-CM: 327.23  5/17/2010 Yes Overview Signed 5/17/2010 10:47 AM by Carlos Mu  
  cpap Seasonal allergic reaction ICD-10-CM: J30.2 ICD-9-CM: 477.9  5/17/2010 Yes Depression with anxiety ICD-10-CM: F41.8 ICD-9-CM: 300.4  5/17/2010 Yes

## 2018-11-29 NOTE — PROGRESS NOTES
Cardiology Progress Note [address], [place], [address] Phone [phone]; Fax [phone] 
 
 
 
2018 9:18 AM  
 
Admit Date:           2018 Admit Diagnosis:  Atrial fibrillation with RVR ([address] :          1955 MRN:          [id number] ASSESSMENT/RECOMMENDATION:  
Atrial fibrillation/flutter with rapid ventricular response: still on cardizem gtt at a low dose, will try to wean off today. Amiodarone load started yesterday, continue toprol and digoxin 
-Dr Jackelin Mckenna to decide on [address] VS A flutter ablation  
-Unsuccessful cardioversion with low dose of 100 J. CHARLIE showed no DEVENDRA thrombus. DCCV 18 with 360 J was not successful. 
- cont heparin gtt, [id number] will need long term [address]- plan to start post aflutter ablation -TSH WNL 
-K and Mag okay Non-ischemic CM: cardiac cath shows no CAD Acute on chronic CHF NYHA class III, new onset: EF 25% per echo.  
-no GDMT right now d/t hypotension 
-PO digoxin - will check digoxin level tomorrow AM 
-flash pulmonary edema  - off high flow O2 on NC now Chest pain: resolved. -The pleuritic chest pain is of unclear etiology, suspect this is pericarditis. Would avoid NSAIDs in the setting of acute CHF/starting OAC. Can use Toradol.  
-chest CTA negative for aortic aneurysm or dissection. 
- Le dopplers negative Sleep apnea: Currently does not use CPAP for last many years. Need to encourage him to start using his CPAP again. 
-follow up with sleep clinic OP Reviewed above plan with the patient, daughter  and attending Cardiology Attending: 
 
Patient personally seen and examined. All the elements of history and examination were personally performed. Assessment and plan was discussed and agree as written above. - Unsuccessful cardioversion today. - Cardiac MRI tomorrow for PV mapping for Afib ablation next week. Fred Hood MD, [facility] No intake/output data recorded. Last 3 Recorded Weights in this Encounter 11/25/18 1645 11/29/18 6515 Weight: 200 lb (90.7 kg) 193 lb 2 oz (87.6 kg)  
 
 
 
11/27 1901 - 11/29 0700 In: 240 [P.O.:240] Out: 1600 [Urine:1600] SUBJECTIVE Waldemar Florez feels much better today, denies SOb, palpitations or CP. Would really like to take a shower. Current Facility-Administered Medications Medication Dose Route Frequency  dilTIAZem (CARDIZEM) 125 mg in dextrose 5% 125 mL infusion  0-15 mg/hr IntraVENous TITRATE  digoxin (LANOXIN) tablet 0.125 mg  0.125 mg Oral DAILY  amiodarone (CORDARONE) tablet 400 mg  400 mg Oral TID  [START ON 12/1/2018] amiodarone (CORDARONE) tablet 400 mg  400 mg Oral DAILY  metoprolol succinate (TOPROL-XL) XL tablet 50 mg  50 mg Oral DAILY  heparin 25,000 units in D5W 250 ml infusion  11-25 Units/kg/hr IntraVENous TITRATE  acetaminophen (TYLENOL) tablet 500 mg  500 mg Oral Q4H PRN  prochlorperazine (COMPAZINE) with saline injection 5 mg  5 mg IntraVENous Q6H PRN  
 sodium chloride (NS) flush 5-10 mL  5-10 mL IntraVENous Q8H  
 sodium chloride (NS) flush 5-10 mL  5-10 mL IntraVENous PRN OBJECTIVE Intake/Output Summary (Last 24 hours) at 11/29/2018 0848 Last data filed at 11/29/2018 5335 Gross per 24 hour Intake 240 ml Output 750 ml Net -510 ml Review of Systems - History obtained from the patient AS PER  HPI Telemetry A fib rate 110's PHYSICAL EXAM  
  
 
Visit Vitals /85 Pulse (!) 104 Temp 98.7 °F (37.1 °C) Resp 22 Ht 5' 11\" (1.803 m) Wt 193 lb 2 oz (87.6 kg) SpO2 98% BMI 26.94 kg/m² Gen: Well-developed, well-nourished, in no acute distress  alert and oriented x 3 HEENT:  Pink conjunctivae, Hearing grossly normal.No scleral icterus or conjunctival, moist mucous membranes Neck: Supple,JVD Resp: On NC- diminished bases, scattered crackles Card: Irregular /accelerated Rate,Rythm,No murmurs, rubs or gallop. No thrills. GI:           rounded, non-tender MSK: No cyanosis or clubbing, good capillary refill Skin: No rashes or ulcers, no bruising. Right radial site covered with CDI, +2 radial pulses Neuro:  Cranial nerves are grossly intact, moving all four extremities, no focal deficit, follows commands appropriately Psych:  Good insight, oriented to person, place and time, alert, Nml Affect LE: No edema DATA REVIEW Laboratory and Imaging have been reviewed by me and are notable for Recent Labs  
  11/26/18 
2148 11/26/18 
1530 TROIQ <0.05 <0.05 Recent Labs  
  11/29/18 
0330 11/28/18 
0616 11/27/18 
0410  139 138  
K 3.9 3.9 4.5  
CO2 26 27 24 BUN 10 11 12 CREA 1.03 1.11 1.09  
* 104* 136* MG 1.9 2.1 2.1 WBC 5.9 7.4 12.6* HGB 13.1 12.8 13.8 HCT 40.9 40.6 43.4  181 175 Jun Castro NP

## 2018-11-29 NOTE — PROGRESS NOTES
I met with pt and huis daughter. Pt informed me that when he is discharged,he will be staying with his life partner,Nayla @ [facility]. I notified the liason nurses with [facility] as pt will be receiving the hospital to home CHF visit post-discharge. The plan is for either a DCCV or ablation this am. 
 
Please consult case management if pt is started on a NOAC so I can insure he receives the coupon as pt has commercial insurance . Pt.'s insurance will be changing in January from [employer] to [employer] and possibly medicare so I notified pt to please keep his pharmacist informed. Pt wants to have his scripts filled @ [facility] in [facility] as he will be staying in [place] with his girlfriend when discharged. Daughter was inquiring about the outpatient sleep study. I discussed this with intensivist who informed me the pulmonology team will set this up for pt. When discharged,pt will have the hospital to home CHF visit through [address] [place] [address]. Per cardiology,pt will likely be stable for discharge tomorrow.  
 
Charo Denny

## 2018-11-29 NOTE — PROGRESS NOTES
Bedside and Verbal shift change report given to Chrissy Isaac RN (oncoming nurse) by Cynthia Flores RN and LILIYA Tyler (offgoing nurse). Report included the following information SBAR, Kardex, Intake/Output and Cardiac Rhythm Afib.  
 
0750 - Patient resting in bed. No complaints of pain or discomfort. aPTT drawn and sent to lab for processing. 4846 - Digoxin 250 mcg IV administered per order given by Canelo Hatch NP.  
0406 - Diltiazem gtt started. Infusing at 5 mg/hr. 1430 - aPTT drawn and sent to lab for processing. Therapeutic x 2.  
1445 - Diltiazem gtt stopped. Heart rated sustaining < 110.  
1752 - Diltiazem restarted at 5 mg/hr. Heart rate in the 110s to 120s. 1830 - IV infiltrated. Bedside and Verbal shift change report given to Uriah Kelly RN (oncoming nurse) by Chrissy Isaac RN (offgoing nurse). Report included the following information SBAR, Kardex, MAR and Cardiac Rhythm Afib. [place]

## 2018-11-29 NOTE — PROCEDURES
Cardiac Electrophysiology Report        PATIENT INFORMATION     Patient Name: Aida Moreno MRN: [id number]                 Study Date: 2018    YOB: 1955   Age: 70 y.o. Gender: male      Procedure:  Transesophageal EchocardiogramRa    Referring Physician:  Krissy Cruz DO and Dr. Cash Carrasquillo     Duty Name   Electrophysiologist Wilton Cardenas MD   Monitor Tonie Cruz RN   Circulator Marianna Unger RN; Kavitha Garrett RT       PATIENT HISTORY     70year old male with newly diagnosed AF/AFL found to have a cardiomyopathy who failed cardioversion now loaded on amiodarone and presenting for repeat attempt at cardioversion. PROCEDURE     The patient was brought to the Cardiac Electrophysiology laboratory in a post-absorptive, fasting state. Informed consent was obtained. A peripheral IV was in place. Continuous electrocardiographic, blood pressure, O2 saturation and  CO2 monitoring was initiated. Once conscious sedation was achieved, a multi-planar lubricated CHARLIE probe was advanced to the oropharynx and into the esophagus without difficulty. Limited views were obtained visualizing the left atrium and left atrial appendage. The emptying velocity of the LA appendage was 0.5 m/s. The left atrial appendage was visualized in multiple views and demonstrated the LA appendage was free of visualized thrombus. The CHARLIE probe was then removed without difficulty. The patient remained hemodynamically stable, tolerated the procedure well and was transferred in stable condition. There were no immediate complications encountered during the procedure. MEDICATION SUMMARY     See anesthesia report      CONCLUSIONS     1.  Limited CHARLIE demonstrating the left atrium and left atrial appendage were free of visualized thrombus           Wilton Cardenas MD  Cardiac Electrophysiology / Cardiology    [address] [place] [address] [address] [address] [facility], [address], [address], [address]   Shree Dalal  [phone] / [phone] Fax [phone] / [phone] Fax

## 2018-11-29 NOTE — PROGRESS NOTES
During report, was told pt is on high flow PRN. Pt needs to be on step down, pt will be kept on ICU.

## 2018-11-29 NOTE — PROGRESS NOTES
Samira Schmid [address] Office [phone] Fax [phone] Assessment and Plan Sadiq Roman is a 70 y.o. male admitted for Atrial Fibrillation with RVR. He has spent 5 night(s) in the hospital. 
 
24 Hour Events: No acute events Atrial fibrillation with RVR: Unclear etiology; possibly 2/2 untreated RAMIREZ. CTA shows small pleural effusions but negative for aortic dissection, PE, or pericardial effusion. TSH wnl and UDS neg. BNP 2639 and CK-MB wnl. S/p failed cardioversion and [place]. BL LE Doppler: negative. Echo with LVEF 20% 
- CHARLIE: Left atrial appendage: No mass was identified. 
- Continue Eliquis - Continue Metoprolol 50mg daily and Digoxin 0.25mg daily - Cariology following, appreciate recs:Cardiac MRI. transfer to [facility] likely early next week  
  
Acute HFrEF: Newly detected this admission. ProBNP: 2639 (elevated). Echo reveals LVEF 20% with mild LA and RA dilation.  
-  Monitor fluid status closely. Strict I/Os, daily weights.  
- Continue BB Pleuritic Chest Pain (resolved): Arrhythmia vs GERD. ACS less likely given neg trop throughout admission. Received Fentanyl x1 but now avoiding due to low BPs. Received Toradol x2 and GI cocktail.  
-Continue Tylenol as needed Acute hypoxic respiratory failure 2/2 pulmonary edema: Improving, weaned from bipap to high flow to now NC. D-dimer: 1.4 (H), initial CTA neg for PE and on heparin gtt. BLE duplex neg for DVT. ABG: pH 7.44, pCO2 32 pO2 84. CXR: pulmonary edema and small pleural effusions - Wean O2 as tolerated to keep sats > 90% - Will diurese prn - Pulm following, appreciate recs: sleep eval as outpatient BPH 
-Will hold home flomax for now 
  
RAMIREZ- May have led to arrhythmia.  
-Not on CPAP at home, needs sleep eval on d/c 
  
FEN/GI - Cardiac diet Activity - Out of bed with assistance DVT prophylaxis - Eliquis GI prophylaxis - Not indicated at this time Fall prophylaxis - Not indicated at this time. Disposition - Plan to d/c to Home. Code Status - Full, discussed with patient  
  
Patient will be discussed with Dr. Herber Brewster (Attending Physician) Adriana Li MD 
Family Medicine Resident Subjective / Objective Subjective Patient resting comfortably. He is ambulating to bathroom. He denied any chest pain, nausea or shortness of breath. Temp (24hrs), Av.8 °F (36.6 °C), Min:97.5 °F (36.4 °C), Max:98.1 °F (36.7 °C) Objective: 
Vital signs: (most recent): Blood pressure 106/68, pulse 86, temperature 97.8 °F (36.6 °C), resp. rate 15, height 5' 11\" (1.803 m), weight 193 lb 2 oz (87.6 kg), SpO2 95 %. Respiratory: O2 Flow Rate (L/min): 2 l/min O2 Device: Nasal cannula Visit Vitals BP 97/74 (BP 1 Location: Left arm, BP Patient Position: At rest;Supine; Head of bed elevated (Comment degrees)) Pulse (!) 126 Temp 97.9 °F (36.6 °C) Resp 18 Ht 5' 11\" (1.803 m) Wt 193 lb 2 oz (87.6 kg) SpO2 95% BMI 26.94 kg/m² General: No acute distress. Alert. Cooperative. Head: Normocephalic. Atraumatic. Eyes:              Conjunctiva pink. Sclera white. Nose:             Septum midline. Mucosa pink. No drainage. Neck: Supple. Normal ROM. No stiffness. Respiratory: CTAB. On NC Cardiovascular: Irregular rhythm and tachycardic. Normal S1,S2. No m/r/g. Pulses 2+ throughout. GI: Nontender. No rebound tenderness or guarding. Nondistended. Extremities:  No LE edema. Distal pulses intact. Musculoskeletal: Full ROM in all extremities. Skin: Warm, dry. No rashes. I/O: 
Date 18 07 - 18 8814 18 0700 - 18 0678 Shift [id number] 24 Hour Total 0736-4672 6718-7655 24 Hour Total  
INTAKE  
P.O. 240 240 480     
  P. O. 240 240 480 Shift Total(mL/kg) 240(2.7) 240(2.7) 480(5.5) OUTPUT Urine(mL/kg/hr) 1350(1.3) 150(0.1) 1500(0.7) 200  200 Urine Voided 9754 633 9775 200  200 Shift Total(mL/kg) 1350(15.4) 150(1.7) 1500(17.1) 200(2.3)  200(2.3) NET -1110 90 -1020 -200  -200 Weight (kg) 87.6 87.6 87.6 87.6 87.6 87.6 CBC: 
Recent Labs 11/30/18 
0645 11/29/18 
0330 11/28/18 
9364 WBC 5.4 5.9 7.4 HGB 13.6 13.1 12.8 HCT 42.6 40.9 40.6  188 181 Metabolic Panel: 
Recent Labs 11/30/18 
0645 11/29/18 
0330 11/28/18 
4484  140 139  
K 4.4 3.9 3.9  105 104 CO2 24 26 27 BUN 14 10 11 CREA 1.12 1.03 1.11  
GLU 98 105* 104* CA 9.0 8.7 8.4* MG 2.1 1.9 2.1 For Billing Chief Complaint Patient presents with  Irregular Heart Beat Hospital Problems  Date Reviewed: 8/8/2018 Codes Class Noted POA * (Principal) Atrial fibrillation with RVR ([facility] [address] ICD-10-CM: I48.91 
ICD-9-CM: 427.31  11/25/2018 Yes Hyperlipemia ICD-10-CM: E78.5 ICD-9-CM: 272.4  2/4/2011 Yes Pre-diabetes ICD-10-CM: R73.03 
ICD-9-CM: 790.29  1/6/2011 Yes RAMIREZ (obstructive sleep apnea) ICD-10-CM: G47.33 
ICD-9-CM: 327.23  5/17/2010 Yes Overview Signed 5/17/2010 10:47 AM by Cornel Cross  
  cpap Seasonal allergic reaction ICD-10-CM: J30.2 ICD-9-CM: 477.9  5/17/2010 Yes Depression with anxiety ICD-10-CM: F41.8 ICD-9-CM: 300.4  5/17/2010 Yes

## 2018-11-29 NOTE — PROGRESS NOTES
1. 0720 - Patient on Hi-Flow. Now on room air. O2 sats > 95%. Heart rate 90s - 120s. On diltiazem at 5 mg/hr. 2. Orders from Tree Burks NP to run diltiazem at 2.5 mg/hr. Now Infusing at 2.5 mg/hr. 3. Patient using incentive spirometer sitting in bedside chair. No complaints of pain. Blood pressure Q2 hours per Tree Burks NP 
4. Magnesium administered. Diltiazem gtt stopped. Heparin infusing at 24 units/kg/hr. 5. 1100 - Patient down for CHARLIE. 6. 1300 - Patient resting in bed. Slightly drowsy, easily aroused. Family at bedside. Diltiazem gtt infusing at 5 mg/hr. Heart rate 110s - 120s. 7. Heart rate < 110. Diltiazem gtt stopped. Patient without complaints. 8. Patient has transfer orders for telemetry. Patient on High-flow at night. No Stepdown rooms available on [facility] at this time. Spoke with Dr. Kim Buchanan of [facility]. Orders to keep patient as Stepdown status due to use of High-Flow at night. 9. 1800 - Patient up in bedside chair. No complaint. Family at bedside. Dilt gtt not restarted at this time. Bedside and Verbal shift change report given to Kervin Lincoln RN (oncoming nurse) by Lore Valle RN (offgoing nurse). Report included the following information SBAR, Kardex, MAR and Cardiac Rhythm Afib. Diltiazem gtt goal passed on to Kervni Lincoln, [facility].

## 2018-11-29 NOTE — PROCEDURES
Cardiac Electrophysiology Report        PATIENT INFORMATION     Patient Name: Vincenzo Canales MRN: [id number]     Study Date: 2018    YOB: 1955   Age: 70 y.o. Gender: male      Procedure:  Leopoldo Bohr    Referring Physician:  Bud Major DO and Dr. Braxton Jernigan     Duty Name   Electrophysiologist Darinel Patel MD   Monitor Darrel Carlton RN   Circulator Maryana Roberto RN; Eleuterio White RN       PATIENT HISTORY     70year old male with newly diagnosed AF/AFL found to have a cardiomyopathy who failed cardioversion now loaded on amiodarone and presenting for repeat attempt at cardioversion.        PROCEDURE     The patient was brought to the Cardiac Electrophysiology laboratory in a post-absorptive, fasting state. Informed consent was obtained. A peripheral IV was in place. Continuous electrocardiographic, blood pressure, O2 saturation and  CO2 monitoring was initiated. Self-adhesive cardioversion patches were positioned on the chest and back. Once conscious sedation was achieved, a single biphasic, synchronized shock at 360 Joules was delivered with successful restoration of sinus rhythm. The patient remained hemodynamically stable, tolerated the procedure well and was transferred in stable condition. There were no immediate complications encountered during the procedure. There was no blood loss and no specimen were removed. CARDIOVERSION DATA     Energy (Joules) Technique Result   360 Biphasic (lateral) Success; ERAF       MEDICATION SUMMARY     Medication Route Unit Total   Fentanyl IV micrograms 50   Versed IV grams 4       RADIOLOGY SUMMARY     N/A      CONCLUSIONS     1. Successful restoration of sinus rhythm followed by early recurrence of atrial fibrillation  2. Restart amiodarone/diltiazem drip  3. Change heparin drip to eliquis  4. Recommend AF/AFL ablation at [facility] on Tuesday  5.  Continue eliquis uninterrupted. Thank you, Milo Arriaga DO and Dr. Violet Perea for involving me in the care of this extraordinarily pleasant male.          Shaheed Santos MD  Cardiac Electrophysiology / Cardiology    [address]  [address] [facility], [facility], [address]  [phone] / [phone] Fax      [phone] / [phone] Fax

## 2018-11-29 NOTE — PROGRESS NOTES
Bedside and Verbal shift change report given to Norma Phelps (oncoming nurse) by Radha Horvath RN (offgoing nurse). Report included the following information SBAR, Kardex and ED Summary.

## 2018-11-29 NOTE — PROGRESS NOTES
30 [facility], [address] with [address] [facility] Resident Progress Note in Brief S: Patient seen and examined at bedside w/ Dr. Benita Green. Patient is awake and alert, reports improvement in breathing compared to last night/earlier today. Denies cp/sob/n/v Pt has no other concerns at the time O: 
Visit Vitals /80 (BP 1 Location: Left arm, BP Patient Position: At rest) Pulse (!) 113 Temp 98.4 °F (36.9 °C) Resp 17 Ht 5' 11\" (1.803 m) Wt 200 lb (90.7 kg) SpO2 95% BMI 27.89 kg/m² Physical Examination:  
General appearance - alert, well appearing, and in no distress Chest - symmetric air entry, CTAB Heart - tachycardia, irregular rhythm Abdomen - soft, nontender, nondistended Neurological - alert, oriented, normal speech, no focal findings Extremities -  no pedal edema A/P:  
Carlos Chiu is a 70 y.o. admitted for A flutter w/ RVR Tachycardia: -121 bpm on cardiac monitoring 
- Continue cardizem drip currently at 5mg/hr 
- NPO at midnight for cardiac ablation tomorrow - Continue to monitor pt Please see daily progress note for detailed plan. Roz Monzon MD 
Family Medicine Resident

## 2018-11-30 ENCOUNTER — APPOINTMENT (OUTPATIENT)
Dept: MRI IMAGING | Age: 63
DRG: 286 | End: 2018-11-30
Attending: NURSE PRACTITIONER
Payer: COMMERCIAL

## 2018-11-30 LAB
ANION GAP SERPL CALC-SCNC: 10 MMOL/L (ref 5–15)
APTT PPP: 30.9 SEC (ref 22.1–32)
BUN SERPL-MCNC: 14 MG/DL (ref 6–20)
BUN/CREAT SERPL: 13 (ref 12–20)
CALCIUM SERPL-MCNC: 9 MG/DL (ref 8.5–10.1)
CHLORIDE SERPL-SCNC: 104 MMOL/L (ref 97–108)
CO2 SERPL-SCNC: 24 MMOL/L (ref 21–32)
CREAT SERPL-MCNC: 1.12 MG/DL (ref 0.7–1.3)
ERYTHROCYTE [DISTWIDTH] IN BLOOD BY AUTOMATED COUNT: 13.2 % (ref 11.5–14.5)
GLUCOSE SERPL-MCNC: 98 MG/DL (ref 65–100)
HCT VFR BLD AUTO: 42.6 % (ref 36.6–50.3)
HGB BLD-MCNC: 13.6 G/DL (ref 12.1–17)
MAGNESIUM SERPL-MCNC: 2.1 MG/DL (ref 1.6–2.4)
MCH RBC QN AUTO: 26.7 PG (ref 26–34)
MCHC RBC AUTO-ENTMCNC: 31.9 G/DL (ref 30–36.5)
MCV RBC AUTO: 83.7 FL (ref 80–99)
NRBC # BLD: 0 K/UL (ref 0–0.01)
NRBC BLD-RTO: 0 PER 100 WBC
PLATELET # BLD AUTO: 224 K/UL (ref 150–400)
PMV BLD AUTO: 10.1 FL (ref 8.9–12.9)
POTASSIUM SERPL-SCNC: 4.4 MMOL/L (ref 3.5–5.1)
RBC # BLD AUTO: 5.09 M/UL (ref 4.1–5.7)
SODIUM SERPL-SCNC: 138 MMOL/L (ref 136–145)
THERAPEUTIC RANGE,PTTT: NORMAL SECS (ref 58–77)
WBC # BLD AUTO: 5.4 K/UL (ref 4.1–11.1)

## 2018-11-30 PROCEDURE — 74011250637 HC RX REV CODE- 250/637: Performed by: NURSE PRACTITIONER

## 2018-11-30 PROCEDURE — 74011000258 HC RX REV CODE- 258: Performed by: NURSE PRACTITIONER

## 2018-11-30 PROCEDURE — A9579 GAD-BASE MR CONTRAST NOS,1ML: HCPCS | Performed by: INTERNAL MEDICINE

## 2018-11-30 PROCEDURE — 80048 BASIC METABOLIC PNL TOTAL CA: CPT

## 2018-11-30 PROCEDURE — 74011636320 HC RX REV CODE- 636/320: Performed by: INTERNAL MEDICINE

## 2018-11-30 PROCEDURE — 65660000000 HC RM CCU STEPDOWN

## 2018-11-30 PROCEDURE — 85730 THROMBOPLASTIN TIME PARTIAL: CPT

## 2018-11-30 PROCEDURE — 83735 ASSAY OF MAGNESIUM: CPT

## 2018-11-30 PROCEDURE — 74011250637 HC RX REV CODE- 250/637: Performed by: INTERNAL MEDICINE

## 2018-11-30 PROCEDURE — 74011250637 HC RX REV CODE- 250/637: Performed by: FAMILY MEDICINE

## 2018-11-30 PROCEDURE — 77010033678 HC OXYGEN DAILY

## 2018-11-30 PROCEDURE — 74011000250 HC RX REV CODE- 250: Performed by: NURSE PRACTITIONER

## 2018-11-30 PROCEDURE — 36415 COLL VENOUS BLD VENIPUNCTURE: CPT

## 2018-11-30 PROCEDURE — 85027 COMPLETE CBC AUTOMATED: CPT

## 2018-11-30 PROCEDURE — 75561 CARDIAC MRI FOR MORPH W/DYE: CPT

## 2018-11-30 RX ORDER — TAMSULOSIN HYDROCHLORIDE 0.4 MG/1
0.4 CAPSULE ORAL
Status: DISCONTINUED | OUTPATIENT
Start: 2018-11-30 | End: 2018-12-02

## 2018-11-30 RX ORDER — COLCHICINE 0.6 MG/1
0.6 CAPSULE ORAL DAILY
Status: DISCONTINUED | OUTPATIENT
Start: 2018-11-30 | End: 2018-11-30

## 2018-11-30 RX ORDER — COLCHICINE 0.6 MG/1
0.6 CAPSULE ORAL DAILY
Status: DISCONTINUED | OUTPATIENT
Start: 2018-11-30 | End: 2018-12-03 | Stop reason: HOSPADM

## 2018-11-30 RX ADMIN — AMIODARONE HYDROCHLORIDE 400 MG: 200 TABLET ORAL at 17:53

## 2018-11-30 RX ADMIN — APIXABAN 5 MG: 5 TABLET, FILM COATED ORAL at 08:20

## 2018-11-30 RX ADMIN — APIXABAN 5 MG: 5 TABLET, FILM COATED ORAL at 21:22

## 2018-11-30 RX ADMIN — Medication 10 ML: at 15:28

## 2018-11-30 RX ADMIN — COLCHICINE 0.6 MG: 0.6 CAPSULE ORAL at 13:54

## 2018-11-30 RX ADMIN — DIGOXIN 0.12 MG: 125 TABLET ORAL at 08:20

## 2018-11-30 RX ADMIN — AMIODARONE HYDROCHLORIDE 400 MG: 200 TABLET ORAL at 21:22

## 2018-11-30 RX ADMIN — TAMSULOSIN HYDROCHLORIDE 0.4 MG: 0.4 CAPSULE ORAL at 10:01

## 2018-11-30 RX ADMIN — AMIODARONE HYDROCHLORIDE 400 MG: 200 TABLET ORAL at 08:20

## 2018-11-30 RX ADMIN — GADOPENTETATE DIMEGLUMINE 35 ML: 469.01 INJECTION INTRAVENOUS at 11:00

## 2018-11-30 RX ADMIN — DILTIAZEM HYDROCHLORIDE 15 MG/HR: 5 INJECTION INTRAVENOUS at 13:53

## 2018-11-30 RX ADMIN — METOPROLOL SUCCINATE 50 MG: 50 TABLET, FILM COATED, EXTENDED RELEASE ORAL at 08:19

## 2018-11-30 RX ADMIN — Medication 10 ML: at 21:23

## 2018-11-30 NOTE — CARDIO/PULMONARY
Cardiac rehab follow-up. 11/30/2018 @ 1551: Met with pt lying in bed on [facility]. Girlfriend present in room kristen dooley received permission to discuss health issues with friend present. Briefly spoke with pt regarding recent events. He did not have ablation done yesterday but will be going to [facility] on Sunday for AFib/flutter ablation on Monday or Tuesday. Pt is ware of adding Cardizem IV to his med and explained purpose for rhythm controls. Pt has HF booklet at bedside and reported he has read \"some of it. \"  Reinforced importance of heart healthy/low Na diet, daily weights and med compliance. Pt reported he is eager to start Cardiac Rehab once eligible.

## 2018-11-30 NOTE — PROGRESS NOTES
[address] [facility] [address] Office [phone] Fax [phone] Assessment and Plan Terry Church is a 70 y.o. male admitted for Atrial Fibrillation with RVR. He has spent 6 night(s) in the hospital. 
 
24 Hour Events: No acute events Atrial fibrillation with RVR: Unclear etiology; possibly 2/2 untreated RAMIREZ. CTA shows small pleural effusions but negative for aortic dissection, PE, or pericardial effusion. TSH wnl and UDS neg. BNP 2639 and CK-MB wnl. S/p failed cardioversion and [place]. BL LE Doppler: negative. Echo with LVEF 20% 
- CHARLIE: Left atrial appendage: No mass was identified. 
- Continue Eliquis - Continue Metoprolol 50mg daily and Digoxin 0.25mg daily - Cariology following, appreciate recs:Cardiac MRI. transfer to [facility] likely early next week  
  
Acute HFrEF: Newly detected this admission. ProBNP: 2639 (elevated). Echo reveals LVEF 20% with mild LA and RA dilation.  
-  Monitor fluid status closely. Strict I/Os, daily weights.  
- Continue BB Pleuritic Chest Pain (resolved): Arrhythmia vs GERD. ACS less likely given neg trop throughout admission. Received Fentanyl x1 but now avoiding due to low BPs. Received Toradol x2 and GI cocktail.  
-Continue Tylenol as needed Acute hypoxic respiratory failure 2/2 pulmonary edema: Improving, weaned from bipap to high flow to now NC. D-dimer: 1.4 (H), initial CTA neg for PE and on heparin gtt. BLE duplex neg for DVT. ABG: pH 7.44, pCO2 32 pO2 84. CXR: pulmonary edema and small pleural effusions - Wean O2 as tolerated to keep sats > 90% - Will diurese prn - Pulm following, appreciate recs: sleep eval as outpatient BPH 
-Will hold home flomax for now 
  
RAMIREZ- May have led to arrhythmia.  
-Not on CPAP at home, needs sleep eval on d/c 
  
FEN/GI - Cardiac diet Activity - Out of bed with assistance DVT prophylaxis - Eliquis GI prophylaxis - Not indicated at this time Fall prophylaxis - Not indicated at this time. Disposition - Plan to d/c to Home. Code Status - Full, discussed with patient  
  
Patient will be discussed with Dr. Magali Miramontes (Attending Physician) Filipe Gonzalez MD 
Family Medicine Resident Subjective / Objective Subjective Patient resting comfortably. He is ambulating to bathroom. He denied any chest pain, nausea or shortness of breath. Temp (24hrs), Av °F (36.7 °C), Min:97.6 °F (36.4 °C), Max:98.4 °F (36.9 °C) Objective: 
Vital signs: (most recent): Blood pressure 112/87, pulse 71, temperature 98 °F (36.7 °C), resp. rate 18, height 5' 11\" (1.803 m), weight 196 lb 3.2 oz (89 kg), SpO2 95 %. Respiratory: O2 Flow Rate (L/min): 2 l/min O2 Device: Room air Visit Vitals /83 (BP 1 Location: Right arm, BP Patient Position: At rest;Supine) Pulse 87 Temp 98.1 °F (36.7 °C) Resp 21 Ht 5' 11\" (1.803 m) Wt 196 lb 3.2 oz (89 kg) SpO2 95% BMI 27.36 kg/m² General: No acute distress. Alert. Cooperative. Head: Normocephalic. Atraumatic. Eyes:              Conjunctiva pink. Sclera white. Nose:             Septum midline. Mucosa pink. No drainage. Neck: Supple. Normal ROM. No stiffness. Respiratory: CTAB. On NC Cardiovascular: Irregular rhythm and tachycardic. Normal S1,S2. No m/r/g. Pulses 2+ throughout. GI: Nontender. No rebound tenderness or guarding. Nondistended. Extremities:  No LE edema. Distal pulses intact. Musculoskeletal: Full ROM in all extremities. Skin: Warm, dry. No rashes. I/O: 
Date 18 - 18 6596 18 - 18 8356 Shift 3145-9457 6246-7089 24 Hour Total 6450-3666 1612-9027 24 Hour Total  
INTAKE  
P.O. 1080  1080     
  P. O. 1080  1080 Shift Total(mL/kg) 1162(32.4)  [id number]) OUTPUT Urine(mL/kg/hr) 975(0.9) 1100 2075 Urine Voided 975 1100 2075 Shift Total(mL/kg) 975(11) 3461(83.3) [id number])  -4655 -413 Weight (kg) 89 89 89 89 89 89 CBC: 
Recent Labs 12/01/18 
0140 11/30/18 
0645 11/29/18 
0330 WBC 5.2 5.4 5.9 HGB 13.4 13.6 13.1 HCT 42.4 42.6 40.9  224 188 Metabolic Panel: 
Recent Labs 12/01/18 
0140 11/30/18 
0645 11/29/18 
0330  138 140  
K 3.9 4.4 3.9  104 105 CO2 24 24 26 BUN 19 14 10 CREA 1.29 1.12 1.03  
* 98 105* CA 8.7 9.0 8.7 MG 1.8 2.1 1.9 For Billing Chief Complaint Patient presents with  Irregular Heart Beat Hospital Problems  Date Reviewed: 8/8/2018 Codes Class Noted POA * (Principal) Atrial fibrillation with RVR ([facility] [address] ICD-10-CM: I48.91 
ICD-9-CM: 427.31  11/25/2018 Yes Hyperlipemia ICD-10-CM: E78.5 ICD-9-CM: 272.4  2/4/2011 Yes Pre-diabetes ICD-10-CM: R73.03 
ICD-9-CM: 790.29  1/6/2011 Yes RAMIREZ (obstructive sleep apnea) ICD-10-CM: G47.33 
ICD-9-CM: 327.23  5/17/2010 Yes Overview Signed 5/17/2010 10:47 AM by Brie Goodson  
  cpap Seasonal allergic reaction ICD-10-CM: J30.2 ICD-9-CM: 477.9  5/17/2010 Yes Depression with anxiety ICD-10-CM: F41.8 ICD-9-CM: 300.4  5/17/2010 Yes

## 2018-11-30 NOTE — PROGRESS NOTES
Beginning of shift:  Bedside and Verbal shift change report given to Raffi Angel RN (oncoming nurse) by Wendy Hawk RN (offgoing nurse). Report included the following information SBAR, Kardex, Procedure Summary, Intake/Output, MAR, Accordion, Recent Results, Med Rec Status and Cardiac Rhythm A Fib. 
 
2110  Per Dr. Godo Rang pt to remain as stepdown overnight w/ PRN Bipap/Hi Flow orders. Initial assessment completed. Introduced self as primary RN.  VSS. Pt is currently sating at 97% on RA. Pt denies additional complaints at this time. Plan for the evening discussed with pt and he has verbalized understanding. Bed locked and in low position with call bell within reach.  Instructed him to press call felix when needed. White board updated with this RN's name. End of shift:  Bedside and Verbal shift change report given to Carlos Enrique Nelson RN (oncoming nurse) by Raffi Angel RN (offgoing nurse). Report included the following information SBAR, Kardex, Procedure Summary, Intake/Output, MAR, Accordion, Recent Results, Med Rec Status and Cardiac Rhythm A Fib.

## 2018-11-30 NOTE — PROGRESS NOTES
. 11- CASE MANAGEMENT NOTE: 
I met with the pt to introduce myself and explain the role of case management. He said he normally lives in his 2-story house that he and his wife built and the wife's mother lives in a first floor in-law suite. . When he and his wife  he moved out and when the ex-wife dies in 2014, he moved back in so now he and his ex-mother-in-law share the home. He has been totally independent with his ADL's, is active, uses no assistive devices, works full-time and drives. At this time the plan is for the pt to transfer to [facility] on 12-2-2018 for an ablation on 12-4-2018. He is planning to go to his girlfriend's, Anne Aaron ([phone]), home at [address] [place] [address], [address] [place] [address] after discharge. The CM in ICU referred him to  [facility] for an [facility] visit and they were notified of the discharge address. CM at [facility] will follow and adjust the discharge plans if needed.  JUDE MurphyW, CM

## 2018-11-30 NOTE — PROGRESS NOTES
[address] [facility], [address] with [address] [facility] Resident Progress Note in Brief I spoke with Dr. Galen Christopher [facility] Hospitalist) via the transfer center regarding transferring Mr. Kaitlyn Velasquez to [facility] for his ablation on Tuesday 12/04. I discussed the patient's case with the hospitalist and he informed me that the patient would not be rejected, however 's is currently full and has no beds. They anticipate he will be accepted on Sunday 12/02, if not sooner. I updated the patient regarding the plan at this time, as well as Davian Arrington (Cardiology) and patient's nurse, Gustabo Edmondson. A/P:  
Carlos Chiu is a 70 y.o. Atrial fibrillation with RVR. Please see daily progress note for detailed plan. Keara Delaney MD 
Family Medicine Resident

## 2018-11-30 NOTE — PROGRESS NOTES
SHIFT CHANGE: 
7:30 AM  Report received from Sampson Bond RN. SBAR, Kardex, Procedure Summary, Intake/Output, MAR, Recent Results, Med Rec Status and Cardiac Rhythm AFLUTTER were discussed. SHIFT SUMMARY: 
8:16 AM  Diltiazem gtt restarted at 5mg/hr. HR up to the 130's. 
 
9:40 AM  Dilt gtt increased to 10mg/hr. HR still elevated to the 120's 
 
9:47 AM  Patient states he has been having to urinate frequently. Takes flomax at home which hasn't been given in the hospital.  Family practice called and states they will order. 12:35 PM  Increased diltiazem gtt the 15mg/hr for HR in the 120's. 
 
1:22 PM  One call called to confirm transfer process has been started. Dr. Bernett Mortimer to accept patient when a bed becomes available on [facility]. 
 
1:34 PM  HR now in the 80-90's. 
 
3:29 PM  BP 95/55 (MAP 67), HR 66-77. Decreased diltiazem gtt to 10mg/hr. Patient states he is mildly lightheaded. Instructed patient not to get up without calling and to inform RN if symptoms worsen. END OF SHIFT REPORT: 
 
7:19 PM Bedside and Verbal shift change report given to Joaquim Martinez RN (oncoming nurse) by Alba Tracey RN (offgoing nurse). Report included the following information SBAR, Kardex, Intake/Output, MAR, Recent Results, Med Rec Status and Cardiac Rhythm AFLUTTER.

## 2018-11-30 NOTE — PROGRESS NOTES
Cardiology Progress Note [address] [facility], [place], [address] [facility] Phone [phone]; Fax [phone] 
 
 
 
2018 9:18 AM  
 
Admit Date:           2018 Admit Diagnosis:  Atrial fibrillation with RVR ([address] :          1955 MRN:          [id number] ASSESSMENT/RECOMMENDATION:  
Atrial fibrillation/flutter with rapid ventricular response: back  on cardizem gtt this morning for afib/flutter w RVR that started around 2 am. Amiodarone load started, continue toprol and digoxin 
-unsuccessful [facility] (3rd attempt this admission) yesterday despite amiodarone loading -CHARLIE showed no DEVENDRA thrombus. DCCV 18 with 360 J was not successful. 
- heparin gtt switched to eliquis 5 mg bid 
-cardiac MRI this morning for PV mapping (for AF ablation) - Plans to transfer to [facility] on Monday for Afib/flutter ablation, will try to set today Non-ischemic CM: cardiac cath shows no CAD Acute on chronic CHF NYHA class III, new onset: EF 25% per echo.  
-no GDMT right now d/t hypotension 
-PO digoxin - will check digoxin level Saturday morning 
-flash pulmonary edema  - Chest pain: resolved. -The pleuritic chest pain is of unclear etiology, suspect this is pericarditis. Would avoid NSAIDs in the setting of acute CHF/starting OAC. Can use Toradol.  
-chest CTA negative for aortic aneurysm or dissection. 
- Le dopplers negative Sleep apnea: Currently does not use CPAP for last many years. Need to encourage him to start using his CPAP again. 
-follow up with sleep clinic OP Reviewed above plan with the patient Cardiology Attending: 
 
Patient personally seen and examined. All the elements of history and examination were personally performed. Assessment and plan was discussed and agree as written above. - CMR show mild pericarditis. Will start Colchicine. If cannot take colchicine then start him on Indomethacin.  
- He is cleared to take quick shower if his HR is better controlled with low dose Cardizem. Fred Angel MD, [facility]  
  
 
 
 
 
 
11/30 7165 - 11/30 1900 In: 360 [P.O.:360] Out: 200 [Urine:200] Last 3 Recorded Weights in this Encounter 11/25/18 1645 11/29/18 0834 11/30/18 1005 Weight: 200 lb (90.7 kg) 193 lb 2 oz (87.6 kg) 196 lb 3.2 oz (89 kg) 11/28 1901 - 11/30 0700 In: 480 [P.O.:480] Out: 2250 [Urine:2250] SUBJECTIVE Kaylyn Fothergill feels much better today, denies SOb, palpitations or CP. Would really like to take a shower. Current Facility-Administered Medications Medication Dose Route Frequency  tamsulosin (FLOMAX) capsule 0.4 mg  0.4 mg Oral DAILY PRN  
 gadopentetate dimeglumine (MAGNEVIST) 7.5 mmol/15 mL (469.01 mg/mL) contrast solution 35 mL  35 mL IntraVENous RAD ONCE  
 apixaban (ELIQUIS) tablet 5 mg  5 mg Oral BID  dilTIAZem (CARDIZEM) 125 mg in dextrose 5% 125 mL infusion  0-15 mg/hr IntraVENous TITRATE  digoxin (LANOXIN) tablet 0.125 mg  0.125 mg Oral DAILY  amiodarone (CORDARONE) tablet 400 mg  400 mg Oral TID  [START ON 12/1/2018] amiodarone (CORDARONE) tablet 400 mg  400 mg Oral DAILY  metoprolol succinate (TOPROL-XL) XL tablet 50 mg  50 mg Oral DAILY  acetaminophen (TYLENOL) tablet 500 mg  500 mg Oral Q4H PRN  prochlorperazine (COMPAZINE) with saline injection 5 mg  5 mg IntraVENous Q6H PRN  
 sodium chloride (NS) flush 5-10 mL  5-10 mL IntraVENous Q8H  
 sodium chloride (NS) flush 5-10 mL  5-10 mL IntraVENous PRN OBJECTIVE Intake/Output Summary (Last 24 hours) at 11/30/2018 1015 Last data filed at 11/30/2018 1842 Gross per 24 hour Intake 840 ml Output 1700 ml Net -860 ml Review of Systems - History obtained from the patient AS PER  HPI 
 
 Telemetry A fib V rate 120-140's PHYSICAL EXAM  
  
 
Visit Vitals BP (!) 123/107 Pulse (!) 128 Temp 97.6 °F (36.4 °C) Resp 23 Ht 5' 11\" (1.803 m) Wt 196 lb 3.2 oz (89 kg) SpO2 92% BMI 27.36 kg/m² Gen: Well-developed, well-nourished, in no acute distress  alert and oriented x 3 HEENT:  Pink conjunctivae, Hearing grossly normal.No scleral icterus or conjunctival, moist mucous membranes Neck: Supple,JVD Resp: On NC- CTAB Card: Irregular /accelerated Rate,Rythm,No murmurs, rubs or gallop. No thrills. GI:           rounded, non-tender MSK: No cyanosis or clubbing, good capillary refill Skin: No rashes or ulcers, no bruising. Right radial site covered with CDI, +2 radial pulses Neuro:  Cranial nerves are grossly intact, moving all four extremities, no focal deficit, follows commands appropriately Psych:  Good insight, oriented to person, place and time, alert, Nml Affect LE: No edema DATA REVIEW Laboratory and Imaging have been reviewed by me and are notable for No results for input(s): CPK, CKMB, TROIQ in the last 72 hours. Recent Labs 11/30/18 
0645 11/29/18 
0330 11/28/18 
1409  140 139  
K 4.4 3.9 3.9 CO2 24 26 27 BUN 14 10 11 CREA 1.12 1.03 1.11  
GLU 98 105* 104* MG 2.1 1.9 2.1 WBC 5.4 5.9 7.4 HGB 13.6 13.1 12.8 HCT 42.6 40.9 40.6  188 181 Ephraim Garcia, MELINA

## 2018-11-30 NOTE — PROGRESS NOTES
[facility] Name: Danielle Fitzgerald III MRN: [id number] : 1955 Hospital: [address] Date: 2018 Impression Plan 1. Afib/flutter with RVR 2. Acute hypoxic respiratory failure 3. RAMIREZ, untreated 4. Systolic heart failure · Cardiology following · Failed repeated cardioversions, planned to undergo ablation next week at [facility] · Cardiac MRI · Digoxin, amiodarone, metoprolol · Dilt drip if needed, currently infusing · Heparin drip off, on eliquis · Cardiac cath without coronary artery disease · Goal sats >90%, on RA now · Will set up with sleep eval as an outpatient when he is ready for discharge · Cardiac diet No further pulmonary intervention, we will sign off and be available as needed. We will arrange f/u in sleep clinic in next week or two for sleep study. Thank you. Radiology ( personally reviewed) CTA chest: no PE, aortic aneurysm or dissection, lungs clear, small pleural effusion CXR without edema, airspace disease ABG No results for input(s): PHI, PO2I, PCO2I in the last 72 hours. Subjective Underwent attempt at [address] [place] yesterday, in Afib w/RVR on exam. Currently on RA 95%, no dyspnea. Want sleep study sooner than later. Review of Systems: 
Review of systems not obtained due to patient factors. Past Medical History:  
Diagnosis Date  Anxiety  BPH  Depression  ED (erectile dysfunction)  Hearing loss  Herniated disc  Hypogonadism  Sleep apnea Past Surgical History:  
Procedure Laterality Date  ECHO STRESS    
 HX HERNIA REPAIR    
 HX ORTHOPAEDIC    
 knee surgery Prior to Admission medications Medication Sig Start Date End Date Taking? Authorizing Provider  
amiodarone (PACERONE) 400 mg tablet Take 1 Tab by mouth three (3) times daily.  18  Yes Tessie Costa MD  
 amiodarone (PACERONE) 400 mg tablet Take 1 Tab by mouth daily. 12/1/18  Yes Luma Valle MD  
apixaban (ELIQUIS) 5 mg tablet Take 1 Tab by mouth two (2) times a day. 11/29/18  Yes Luma Valle MD  
digoxin (LANOXIN) 0.125 mg tablet Take 1 Tab by mouth daily. 11/30/18  Yes Luma Valle MD  
dilTIAZem Tri [place]) infusion 0-15 mg/hr by IntraVENous route TITRATE. 11/29/18  Yes Luma Valle MD  
metoprolol succinate (TOPROL-XL) 50 mg XL tablet Take 1 Tab by mouth daily. 11/30/18  Yes Luma Valle MD  
prochlorperazine 5 mg/mL soln 5 mg 5 mg by IntraVENous route every six (6) hours as needed for Nausea or Vomiting for up to 1 day. 11/29/18 11/30/18 Yes Luma Valle MD  
tamsulosin (FLOMAX) 0.4 mg capsule Take 0.4 mg by mouth daily as needed (trouble urinating). Yes Provider, Historical  
ibuprofen (MOTRIN) 200 mg tablet Take 200 mg by mouth every eight (8) hours as needed for Pain. Yes Provider, Historical  
 
Current Facility-Administered Medications Medication Dose Route Frequency  apixaban (ELIQUIS) tablet 5 mg  5 mg Oral BID  dilTIAZem (CARDIZEM) 125 mg in dextrose 5% 125 mL infusion  0-15 mg/hr IntraVENous TITRATE  digoxin (LANOXIN) tablet 0.125 mg  0.125 mg Oral DAILY  amiodarone (CORDARONE) tablet 400 mg  400 mg Oral TID  [START ON 12/1/2018] amiodarone (CORDARONE) tablet 400 mg  400 mg Oral DAILY  metoprolol succinate (TOPROL-XL) XL tablet 50 mg  50 mg Oral DAILY  sodium chloride (NS) flush 5-10 mL  5-10 mL IntraVENous Q8H No Known Allergies Social History Tobacco Use  Smoking status: Former Smoker  Smokeless tobacco: Never Used Substance Use Topics  Alcohol use: Yes Alcohol/week: 3.0 oz Types: 6 Cans of beer per week Comment: social  
  
Family History Problem Relation Age of Onset  Cancer Mother  Diabetes Father  Hypertension Father  Dementia Father  Cancer Sister Laboratory: I have personally reviewed the critical care flowsheet and labs. Recent Labs 11/30/18 
0645 11/29/18 
0330 11/28/18 
6669 WBC 5.4 5.9 7.4 HGB 13.6 13.1 12.8 HCT 42.6 40.9 40.6  188 181 Recent Labs 11/30/18 
0645 11/29/18 
0330 11/28/18 
7723  140 139  
K 4.4 3.9 3.9  105 104 CO2 24 26 27 GLU 98 105* 104* BUN 14 10 11 CREA 1.12 1.03 1.11  
CA 9.0 8.7 8.4* MG 2.1 1.9 2.1 Objective: Mode Rate Tidal Volume Pressure FiO2 PEEP  
         60 % Vital Signs:   
 TMAX(24) Intake/Output:  
Last shift:      Ventilator Volumes Vt Spont (ml): 712 ml Ve Observed (l/min): 17.8 l/min Last 3 shifts: 11/30 0701 - 11/30 1900 In: 360 [P.O.:360] Out: 200 [Urine:200]RRIOLAST3 Intake/Output Summary (Last 24 hours) at 11/30/2018 0940 Last data filed at 11/30/2018 4874 Gross per 24 hour Intake 840 ml Output 1700 ml Net -860 ml EXAM: 
GENERAL: well developed and in no distress, HEENT:  PERRL, EOMI, no alar flaring or epistaxis, oral mucosa moist without cyanosis, NECK:  no jugular vein distention, no retractions, no thyromegaly or masses, LUNGS: scattered crackles, HEART:  Irregularly irregular, with no MGR; no edema is present, ABDOMEN:  soft with no tenderness, bowel sounds present, EXTREMITIES:  warm with no cyanosis, SKIN:  no jaundice or ecchymosis and NEUROLOGIC:  alert and oriented, grossly non-focal 
 
Bennetta Alnancy, NP 
[facility]

## 2018-11-30 NOTE — PROGRESS NOTES
Nutrition Assessment: 
 
RECOMMENDATIONS/INTERVENTION(S):  
1. Continue with current diet. 2. Monitor PO intakes, weight. ASSESSMENT:  
68yo male admitted for Afib. RD assessment for LOS. No significant PMHx related to nutrition. Weight WNL per BMI per age. Pt has Cardiac Diet ordered with good PO intakes; documented as 100% for most every meal.  Pt reports good appetite at home PTA. No difficulty chewing/swallowing. Denies any significant weight changes. Medical records confirm stable weight. Labs and meds reviewed. Diet Order: Cardiac 
% Eaten:   
Patient Vitals for the past 72 hrs: 
 % Diet Eaten 11/30/18 0824 100 % 11/29/18 1400 100 % 11/28/18 0900 25 % 11/27/18 1800 100 % Pertinent Medications: [x] Reviewed Labs: [x] Reviewed Anthropometrics: Height: 5' 11\" (180.3 cm) Weight: 89 kg (196 lb 3.2 oz) IBW (%IBW):   ( ) UBW (%UBW):   (  %) BMI: Body mass index is 27.36 kg/m². This BMI is indicative of: 
 [] Underweight    [x] Normal  - for age  [] Overweight    []  Obesity    []  Extreme Obesity (BMI>40) Estimated Nutrition Needs (Based on): 1386 Kcals/day(BMR 1696 x AF 1.3) , 71 g(ABW x 0.8-1gm/day (71-89gm)) Protein Carbohydrate: At Least 130 g/day  Fluids: 2205 mL/day (1 ml/kcal) Last BM: 11/29  [x]Active     []Hyperactive  []Hypoactive       [] Absent   BS Skin:    [] Intact   [] Incision  [] Breakdown   [] DTI   [] Tears/Excoriation/Abrasion  []Edema [] Other: Wt Readings from Last 30 Encounters:  
11/30/18 89 kg (196 lb 3.2 oz) 08/08/18 91.2 kg (201 lb) 02/08/18 93 kg (205 lb) 08/18/17 92.4 kg (203 lb 12.8 oz) 08/09/17 93 kg (205 lb)  
07/28/17 91.6 kg (202 lb)  
09/26/16 91.2 kg (201 lb 1 oz) 08/19/16 89.1 kg (196 lb 6.4 oz) 04/12/16 93.9 kg (207 lb) 12/02/15 90.8 kg (200 lb 3.2 oz) 09/03/15 89.5 kg (197 lb 6.4 oz) 02/19/15 94.3 kg (207 lb 12.8 oz) 02/04/15 93.9 kg (207 lb) 10/16/14 94.1 kg (207 lb 6.4 oz) 10/03/14 93.9 kg (207 lb) 09/04/14 93 kg (205 lb)  
07/24/14 92.1 kg (203 lb)  
02/27/14 94.3 kg (208 lb) 12/30/13 92.5 kg (204 lb)  
11/22/13 92.5 kg (204 lb) 07/08/13 92.1 kg (203 lb)  
02/27/13 92.1 kg (203 lb) 01/04/13 97.5 kg (215 lb) 09/06/12 98.4 kg (217 lb) 12/13/11 98.3 kg (216 lb 12.8 oz) 09/16/11 98.8 kg (217 lb 12.8 oz) 09/08/11 98.9 kg (218 lb) 08/30/11 99.2 kg (218 lb 9.6 oz) 04/14/11 96.4 kg (212 lb 9.6 oz) 02/04/11 99.5 kg (219 lb 6.4 oz) NUTRITION DIAGNOSES:  
Problem:  No nutritional diagnosis at this time Etiology: related to Signs/Symptoms: as evidenced by NUTRITION INTERVENTIONS: 
Meals/Snacks: General/healthful diet GOAL:  
Continue to consume > 75% all meals in next 5-7 days Cultural, Mormon, or Ethnic Dietary Needs: None EDUCATION & DISCHARGE NEEDS:  
 [x] None Identified 
 [] Identified and Education Provided/Documented 
 [] Identified and Pt declined/was not appropriate [x] Interdisciplinary Care Plan Reviewed/Documented  
 [x] Discharge Needs:    Continue to follow Cardiac diet at home 
 [] No Nutrition Related Discharge Needs NUTRITION RISK:  
Pt Is At Nutrition Risk  [x] No Nutrition Risk Identified  [] PT SEEN FOR:  
 []  MD Consult: []Calorie Count []Diabetic Diet Education []Diet Education []Electrolyte Management []General Nutrition Management and Supplements []Management of Tube Feeding []TPN Recommendations []  RN Referral:  []MST score >=2 
   []Enteral/Parenteral Nutrition PTA []Pregnant: Gestational DM or Multigestation  
              [] Pressure Ulcer 
 
[]  Low BMI      [x]  Length of Stay       [] Dysphagia Diet         [] Ventilator 
[]  Follow-up Previous Recommendations: 
 [] Implemented          [] Not Implemented          [x] Not Applicable Previous Goal: 
 [] Met              [] Progressing Towards Goal              [] Not Progressing Towards Goal   [x] Not Applicable Belia Ganser, RD Pager [phone] Phone [phone]

## 2018-11-30 NOTE — PROGRESS NOTES
Problem: Falls - Risk of 
Goal: *Absence of Falls Document [place] Flow Fall Risk and appropriate interventions in the flowsheet. Outcome: Progressing Towards Goal 
Fall Risk Interventions: 
  
 
  
 
Medication Interventions: Patient to call before getting OOB History of Falls Interventions: Room close to nurse's station

## 2018-11-30 NOTE — PROGRESS NOTES
TRANSFER - OUT REPORT: 
 
Verbal report given to Keli(name) on Gladys Blum III  being transferred to PCP(unit) for routine progression of care Report consisted of patients Situation, Background, Assessment and  
Recommendations(SBAR). Information from the following report(s) SBAR, Kardex, Procedure Summary, Intake/Output, MAR and Cardiac Rhythm  a fib was reviewed with the receiving nurse. Lines:  
Peripheral IV 11/25/18 Left Forearm (Active) Site Assessment Clean, dry, & intact 11/29/2018  7:38 PM  
Phlebitis Assessment 0 11/29/2018  7:38 PM  
Infiltration Assessment 0 11/29/2018  7:38 PM  
Dressing Status Clean, dry, & intact 11/29/2018  7:38 PM  
Dressing Type Tape;Transparent 11/29/2018  7:38 PM  
Hub Color/Line Status Pink;Capped 11/29/2018  7:38 PM  
Action Taken Open ports on tubing capped 11/29/2018  7:00 AM  
Alcohol Cap Used Yes 11/29/2018  7:00 AM  
   
Peripheral IV 11/28/18 Right;Upper Arm (Active) Site Assessment Clean, dry, & intact 11/29/2018  7:38 PM  
Phlebitis Assessment 0 11/29/2018  7:38 PM  
Infiltration Assessment 0 11/29/2018  7:38 PM  
Dressing Status Clean, dry, & intact 11/29/2018  7:38 PM  
Dressing Type Tape;Transparent 11/29/2018  7:38 PM  
Hub Color/Line Status Pink;Capped 11/29/2018  7:38 PM  
Action Taken Open ports on tubing capped 11/29/2018  7:00 AM  
Alcohol Cap Used Yes 11/29/2018  7:00 AM  
  
 
Opportunity for questions and clarification was provided. Patient transported with: 
 Monitor Registered Nurse Tech

## 2018-11-30 NOTE — PROGRESS NOTES
30 [facility] [address] with 301 [facility] Resident Progress Note in Brief S: Patient seen and examined at bedside. Patient is awake and alert Pain is well controlled Denies cp/sob/n/v Pt has no other concerns at the time O: 
Visit Vitals /88 (BP 1 Location: Right arm) Pulse (!) 110 Temp 97.5 °F (36.4 °C) Resp 22 Ht 5' 11\" (1.803 m) Wt 193 lb 2 oz (87.6 kg) SpO2 96% BMI 26.94 kg/m² Physical Examination:  
General appearance - alert, well appearing, and in no distress Chest - clear to auscultation, symmetric air entry Heart - normal rate, regular rhythm, normal S1, S2 Abdomen - soft, nontender, nondistended Neurological - alert, oriented, normal speech, no focal findings Extremities - peripheral pulses normal, no pedal edema A/P:  
Faith Mike is a 70 y.o. admitted for A. flutter/A. fib Stable at this time, SpO2 >95% on RA. In the past, has required Hi flow NC overnight for RAMIREZ (refusing CPAP) - Restart Hi Flow as needed for increased O2 requirement/SOB in the setting of RAMIREZ 
- HR currently 101-124 on monitor. Off cardizem drip. Taking amiodarone, digoxin, metoprolol.  
- Continue to monitor pt Please see daily progress note for detailed plan. Asya Cortes MD 
Family Medicine Resident

## 2018-12-01 LAB
ANION GAP SERPL CALC-SCNC: 11 MMOL/L (ref 5–15)
APTT PPP: 31.5 SEC (ref 22.1–32)
BUN SERPL-MCNC: 19 MG/DL (ref 6–20)
BUN/CREAT SERPL: 15 (ref 12–20)
CALCIUM SERPL-MCNC: 8.7 MG/DL (ref 8.5–10.1)
CHLORIDE SERPL-SCNC: 104 MMOL/L (ref 97–108)
CO2 SERPL-SCNC: 24 MMOL/L (ref 21–32)
CREAT SERPL-MCNC: 1.29 MG/DL (ref 0.7–1.3)
DIGOXIN SERPL-MCNC: 0.6 NG/ML (ref 0.9–2)
ERYTHROCYTE [DISTWIDTH] IN BLOOD BY AUTOMATED COUNT: 13 % (ref 11.5–14.5)
GLUCOSE SERPL-MCNC: 117 MG/DL (ref 65–100)
HCT VFR BLD AUTO: 42.4 % (ref 36.6–50.3)
HGB BLD-MCNC: 13.4 G/DL (ref 12.1–17)
MAGNESIUM SERPL-MCNC: 1.8 MG/DL (ref 1.6–2.4)
MCH RBC QN AUTO: 26.2 PG (ref 26–34)
MCHC RBC AUTO-ENTMCNC: 31.6 G/DL (ref 30–36.5)
MCV RBC AUTO: 83 FL (ref 80–99)
NRBC # BLD: 0 K/UL (ref 0–0.01)
NRBC BLD-RTO: 0 PER 100 WBC
PHOSPHATE SERPL-MCNC: 3.7 MG/DL (ref 2.6–4.7)
PLATELET # BLD AUTO: 202 K/UL (ref 150–400)
PMV BLD AUTO: 10.2 FL (ref 8.9–12.9)
POTASSIUM SERPL-SCNC: 3.9 MMOL/L (ref 3.5–5.1)
RBC # BLD AUTO: 5.11 M/UL (ref 4.1–5.7)
SODIUM SERPL-SCNC: 139 MMOL/L (ref 136–145)
THERAPEUTIC RANGE,PTTT: NORMAL SECS (ref 58–77)
WBC # BLD AUTO: 5.2 K/UL (ref 4.1–11.1)

## 2018-12-01 PROCEDURE — 74011000250 HC RX REV CODE- 250: Performed by: NURSE PRACTITIONER

## 2018-12-01 PROCEDURE — 74011250637 HC RX REV CODE- 250/637: Performed by: NURSE PRACTITIONER

## 2018-12-01 PROCEDURE — 85027 COMPLETE CBC AUTOMATED: CPT

## 2018-12-01 PROCEDURE — 36415 COLL VENOUS BLD VENIPUNCTURE: CPT

## 2018-12-01 PROCEDURE — 74011250636 HC RX REV CODE- 250/636: Performed by: STUDENT IN AN ORGANIZED HEALTH CARE EDUCATION/TRAINING PROGRAM

## 2018-12-01 PROCEDURE — 83735 ASSAY OF MAGNESIUM: CPT

## 2018-12-01 PROCEDURE — 84100 ASSAY OF PHOSPHORUS: CPT

## 2018-12-01 PROCEDURE — 74011250637 HC RX REV CODE- 250/637: Performed by: STUDENT IN AN ORGANIZED HEALTH CARE EDUCATION/TRAINING PROGRAM

## 2018-12-01 PROCEDURE — 74011250637 HC RX REV CODE- 250/637: Performed by: INTERNAL MEDICINE

## 2018-12-01 PROCEDURE — 80048 BASIC METABOLIC PNL TOTAL CA: CPT

## 2018-12-01 PROCEDURE — 74011250637 HC RX REV CODE- 250/637: Performed by: FAMILY MEDICINE

## 2018-12-01 PROCEDURE — 74011000258 HC RX REV CODE- 258: Performed by: NURSE PRACTITIONER

## 2018-12-01 PROCEDURE — 65660000000 HC RM CCU STEPDOWN

## 2018-12-01 PROCEDURE — 85730 THROMBOPLASTIN TIME PARTIAL: CPT

## 2018-12-01 PROCEDURE — 80162 ASSAY OF DIGOXIN TOTAL: CPT

## 2018-12-01 RX ORDER — POTASSIUM CHLORIDE 750 MG/1
20 TABLET, FILM COATED, EXTENDED RELEASE ORAL 2 TIMES DAILY WITH MEALS
Status: DISCONTINUED | OUTPATIENT
Start: 2018-12-01 | End: 2018-12-01

## 2018-12-01 RX ORDER — MAGNESIUM SULFATE 1 G/100ML
1 INJECTION INTRAVENOUS ONCE
Status: COMPLETED | OUTPATIENT
Start: 2018-12-01 | End: 2018-12-02

## 2018-12-01 RX ORDER — POTASSIUM CHLORIDE 750 MG/1
20 TABLET, FILM COATED, EXTENDED RELEASE ORAL EVERY 4 HOURS
Status: COMPLETED | OUTPATIENT
Start: 2018-12-01 | End: 2018-12-01

## 2018-12-01 RX ORDER — ACETAMINOPHEN 500 MG
500 TABLET ORAL
Qty: 30 TAB | Refills: 0 | Status: SHIPPED
Start: 2018-12-01 | End: 2021-11-10

## 2018-12-01 RX ORDER — POTASSIUM CHLORIDE 1.5 G/1.77G
20 POWDER, FOR SOLUTION ORAL 2 TIMES DAILY WITH MEALS
Status: DISCONTINUED | OUTPATIENT
Start: 2018-12-01 | End: 2018-12-01

## 2018-12-01 RX ORDER — COLCHICINE 0.6 MG/1
0.6 CAPSULE ORAL DAILY
Qty: 30 CAP | Refills: 0 | Status: SHIPPED
Start: 2018-12-02 | End: 2019-01-03 | Stop reason: SDUPTHER

## 2018-12-01 RX ADMIN — DIGOXIN 0.12 MG: 125 TABLET ORAL at 08:59

## 2018-12-01 RX ADMIN — DILTIAZEM HYDROCHLORIDE 7.5 MG/HR: 5 INJECTION INTRAVENOUS at 20:28

## 2018-12-01 RX ADMIN — DILTIAZEM HYDROCHLORIDE 7.5 MG/HR: 5 INJECTION INTRAVENOUS at 05:33

## 2018-12-01 RX ADMIN — APIXABAN 5 MG: 5 TABLET, FILM COATED ORAL at 20:40

## 2018-12-01 RX ADMIN — AMIODARONE HYDROCHLORIDE 400 MG: 200 TABLET ORAL at 08:59

## 2018-12-01 RX ADMIN — APIXABAN 5 MG: 5 TABLET, FILM COATED ORAL at 08:59

## 2018-12-01 RX ADMIN — POTASSIUM CHLORIDE 20 MEQ: 750 TABLET, EXTENDED RELEASE ORAL at 08:59

## 2018-12-01 RX ADMIN — TAMSULOSIN HYDROCHLORIDE 0.4 MG: 0.4 CAPSULE ORAL at 09:03

## 2018-12-01 RX ADMIN — METOPROLOL SUCCINATE 50 MG: 50 TABLET, FILM COATED, EXTENDED RELEASE ORAL at 08:59

## 2018-12-01 RX ADMIN — MAGNESIUM SULFATE HEPTAHYDRATE 1 G: 1 INJECTION, SOLUTION INTRAVENOUS at 09:09

## 2018-12-01 RX ADMIN — Medication 10 ML: at 14:22

## 2018-12-01 RX ADMIN — Medication 10 ML: at 20:40

## 2018-12-01 RX ADMIN — COLCHICINE 0.6 MG: 0.6 CAPSULE ORAL at 08:59

## 2018-12-01 RX ADMIN — POTASSIUM CHLORIDE 20 MEQ: 750 TABLET, EXTENDED RELEASE ORAL at 14:21

## 2018-12-01 NOTE — PROGRESS NOTES
Beginning of shift:  Bedside and Verbal shift change report given to Arthur Sagastume, RN (oncoming nurse) by Afsaneh Miramontes RN (offgoing nurse). Report included the following information SBAR, Kardex, Intake/Output, MAR, Accordion, Recent Results, Med Rec Status and Cardiac Rhythm A Flutter. 1940  Initial assessment completed. Introduced self as primary RN. Diltiazem drip currently set at 7.5 mg/hr. VSS.  Pt is in bed and appears to be comfortable w/ daughter at bedside. Pt denies additional complaints or needs at this time. Plan for the evening discussed with pt and verbalized understanding. Bed locked and in low position with call bell within reach.  Instructed him to press call felix when needed. White board updated with this RN's name. 2125  Medication given PO w/o complications. Diltiazem drip continued at 7.5 mg/hr as pt's VS continue to be stable and HR in 80's. 0003  Responded to call bell. Pt c/o L sided chest pain that he says worsens whenever he lifts up his left arm. He states he felt it last night, once throughout the day, and just now. He denies having SOB, dizziness, or lightheadedness. He stated it chest pain came a few minutes after having been standing to urinate. Informed pt of recent Cardiac MRI showing mild pericardial inflammation which causes chest discomfort. Informed pt if chest pain continues, is persistant, or worsens then he's to notify the RN immediately. Pt verbalized understanding. 0143  Blood drawn for labs. A few orders noted to be missing in [place], which may be the reason why the phlebotomist missed yesterdays lab orders. FP resident notified and asked to re-order labs.

## 2018-12-01 NOTE — PROGRESS NOTES
SHIFT CHANGE: 
7:39 AM Report received from Tarik Herbert RN. SBAR, Intake/Output, MAR, Recent Results and Cardiac Rhythm AFLUTTER were discussed. SHIFT SUMMARY: 
 
 
 
END OF SHIFT REPORT: 
 
7:30 PM  Bedside and Verbal shift change report given to Malvin Benavides RN (oncoming nurse) by Kim Bass RN (offgoing nurse). Report included the following information SBAR, Kardex, Procedure Summary, Intake/Output, MAR, Recent Results, Med Rec Status and Cardiac Rhythm AFLUTTER.

## 2018-12-01 NOTE — PROGRESS NOTES
SUBJECTIVE No acute events overnight. MRI was performed yesterday and patient was started on colchicine due to mild pericarditis that was observed. There is no pericardial effusion. LV function remained to 25%. Telemetry has shown heart rates up to 140 bpm last night however currently heart rates are in the 80s-90s. ACTIVE PROBLEM LIST Patient Active Problem List  
 Diagnosis Date Noted  Atrial fibrillation with RVR ([address] 11/25/2018  Memory loss 09/04/2014  Lumbar radiculitis 07/18/2013  Hypogonadism male 04/14/2011  Hyperlipemia 02/04/2011  Pre-diabetes 01/06/2011  Sleep apnea 12/21/2010  RAMIREZ (obstructive sleep apnea) 05/17/2010  ED (erectile dysfunction) 05/17/2010  Hypogonadism 05/17/2010  Seasonal allergic reaction 05/17/2010  Depression with anxiety 05/17/2010 MEDICATIONS Current Facility-Administered Medications Medication Dose Route Frequency  magnesium sulfate 1 g/100 ml IVPB (premix or compounded)  1 g IntraVENous ONCE  potassium chloride SR (KLOR-CON 10) tablet 20 mEq  20 mEq Oral Q4H  
 tamsulosin (FLOMAX) capsule 0.4 mg  0.4 mg Oral DAILY PRN  
 colchicine (MITIGARE) capsule 0.6 mg  0.6 mg Oral DAILY  apixaban (ELIQUIS) tablet 5 mg  5 mg Oral BID  dilTIAZem (CARDIZEM) 125 mg in dextrose 5% 125 mL infusion  0-15 mg/hr IntraVENous TITRATE  digoxin (LANOXIN) tablet 0.125 mg  0.125 mg Oral DAILY  amiodarone (CORDARONE) tablet 400 mg  400 mg Oral DAILY  metoprolol succinate (TOPROL-XL) XL tablet 50 mg  50 mg Oral DAILY  acetaminophen (TYLENOL) tablet 500 mg  500 mg Oral Q4H PRN  prochlorperazine (COMPAZINE) with saline injection 5 mg  5 mg IntraVENous Q6H PRN  
 sodium chloride (NS) flush 5-10 mL  5-10 mL IntraVENous Q8H  
 sodium chloride (NS) flush 5-10 mL  5-10 mL IntraVENous PRN  
  
 
 
PAST MEDICAL HISTORY Past Medical History:  
Diagnosis Date  Anxiety  BPH  Depression  ED (erectile dysfunction)  Hearing loss  Herniated disc  Hypogonadism  Sleep apnea PAST SURGICAL HISTORY Past Surgical History:  
Procedure Laterality Date  ECHO STRESS    
 HX HERNIA REPAIR    
 HX ORTHOPAEDIC    
 knee surgery ALLERGIES No Known Allergies FAMILY HISTORY Family History Problem Relation Age of Onset  Cancer Mother  Diabetes Father  Hypertension Father  Dementia Father  Cancer Sister   
 negative for cardiac disease SOCIAL HISTORY Social History Socioeconomic History  Marital status: LIFE PARTNER Spouse name: Not on file  Number of children: Not on file  Years of education: Not on file  Highest education level: Not on file Tobacco Use  Smoking status: Former Smoker  Smokeless tobacco: Never Used Substance and Sexual Activity  Alcohol use: Yes Alcohol/week: 3.0 oz Types: 6 Cans of beer per week Comment: social  
 Drug use: No  
 Sexual activity: Yes MEDICATIONS Current Facility-Administered Medications Medication Dose Route Frequency  magnesium sulfate 1 g/100 ml IVPB (premix or compounded)  1 g IntraVENous ONCE  potassium chloride SR (KLOR-CON 10) tablet 20 mEq  20 mEq Oral Q4H  
 tamsulosin (FLOMAX) capsule 0.4 mg  0.4 mg Oral DAILY PRN  
 colchicine (MITIGARE) capsule 0.6 mg  0.6 mg Oral DAILY  apixaban (ELIQUIS) tablet 5 mg  5 mg Oral BID  dilTIAZem (CARDIZEM) 125 mg in dextrose 5% 125 mL infusion  0-15 mg/hr IntraVENous TITRATE  digoxin (LANOXIN) tablet 0.125 mg  0.125 mg Oral DAILY  amiodarone (CORDARONE) tablet 400 mg  400 mg Oral DAILY  metoprolol succinate (TOPROL-XL) XL tablet 50 mg  50 mg Oral DAILY  acetaminophen (TYLENOL) tablet 500 mg  500 mg Oral Q4H PRN  prochlorperazine (COMPAZINE) with saline injection 5 mg  5 mg IntraVENous Q6H PRN  
 sodium chloride (NS) flush 5-10 mL  5-10 mL IntraVENous Q8H  
  sodium chloride (NS) flush 5-10 mL  5-10 mL IntraVENous PRN I have reviewed the nurses notes, vitals, problem list, allergy list, medical history, family, social history and medications. REVIEW OF SYMPTOMS General: Pt denies excessive weight gain or loss. Pt is able to conduct ADL's HEENT: Denies blurred vision, headaches, hearing loss, epistaxis and difficulty swallowing. Respiratory: Denies cough, congestion, shortness of breath, MATHEW, wheezing or stridor. Cardiovascular: Denies precordial pain, palpitations, edema or PND Gastrointestinal: Denies poor appetite, indigestion, abdominal pain or blood in stool Genitourinary: Denies hematuria, dysuria, increased urinary frequency Musculoskeletal: Denies joint pain or swelling from muscles or joints Neurologic: Denies tremor, paresthesias, headache, or sensory motor disturbance Psychiatric: Denies confusion, insomnia, depression Integumentray: Denies rash, itching or ulcers. Hematologic: Denies easy bruising, bleeding PHYSICAL EXAMINATION Vitals:  
 12/01/18 0013 12/01/18 0700 12/01/18 0708 12/01/18 7834 BP:    110/76 Pulse:   90 98 Resp:    17 Temp: 98.1 °F (36.7 °C)   98.1 °F (36.7 °C) SpO2:    96% Weight:  192 lb 4.8 oz (87.2 kg) Height:      
 
General: Well developed, in no acute distress. HEENT: No jaundice, oral mucosa moist, no oral ulcers Neck: Supple, no stiffness, no lymphadenopathy, supple Heart: Irregularly irregular, no murmur, gallop or rub, no jugular venous distention Respiratory: Clear bilaterally x 4, no wheezing or rales Abdomen:   Soft, non-tender, bowel sounds are active.  
Extremities:  No edema, normal cap refill, no cyanosis. Musculoskeletal: No clubbing, no deformities Neuro: A&Ox3, speech clear, gait stable, cooperative, no focal neurologic deficits Skin: Skin color is normal. No rashes or lesions. Non diaphoretic, moist. 
Vascular: 2+ pulses symmetric in all extremities DIAGNOSTIC DATA TELEMETRY: Atrial fibrillation LABORATORY DATA Lab Results Component Value Date/Time WBC 5.2 12/01/2018 01:40 AM  
 HGB 13.4 12/01/2018 01:40 AM  
 HCT 42.4 12/01/2018 01:40 AM  
 PLATELET 589 88/97/2482 01:40 AM  
 MCV 83.0 12/01/2018 01:40 AM  
  
Lab Results Component Value Date/Time Sodium 139 12/01/2018 01:40 AM  
 Potassium 3.9 12/01/2018 01:40 AM  
 Chloride 104 12/01/2018 01:40 AM  
 CO2 24 12/01/2018 01:40 AM  
 Anion gap 11 12/01/2018 01:40 AM  
 Glucose 117 (H) 12/01/2018 01:40 AM  
 BUN 19 12/01/2018 01:40 AM  
 Creatinine 1.29 12/01/2018 01:40 AM  
 BUN/Creatinine ratio 15 12/01/2018 01:40 AM  
 GFR est AA >60 12/01/2018 01:40 AM  
 GFR est non-AA 56 (L) 12/01/2018 01:40 AM  
 Calcium 8.7 12/01/2018 01:40 AM  
 Bilirubin, total 1.1 (H) 11/25/2018 04:58 PM  
 AST (SGOT) 26 11/25/2018 04:58 PM  
 Alk. phosphatase 54 11/25/2018 04:58 PM  
 Protein, total 7.2 11/25/2018 04:58 PM  
 Albumin 3.7 11/25/2018 04:58 PM  
 Globulin 3.5 11/25/2018 04:58 PM  
 A-G Ratio 1.1 11/25/2018 04:58 PM  
 ALT (SGPT) 67 11/25/2018 04:58 PM  
  
 
 
 ASSESSMENT 1. Atrial fibrillation A. Persistent B. Failed cardioversion x2 2. Atrial flutter 3. Chest pain 4. Cardiomyopathy 5. RAMIREZ 6. Pericarditis PLAN Continue colchicine and rate controlling medications for now. Plan for transfer to [facility] Sunday or Monday in preparation for AF/AFL ablation to be performed on Tuesday afternoon. Continue Eliquis throughout uninterrupted. Cristiane Arias MD 
Cardiac Electrophysiology / Cardiology [address], [address] [place] [address], [address] 
[phone] / [phone] Fax   [phone] / [phone] Fax

## 2018-12-01 NOTE — PROGRESS NOTES
30 [facility] [address] with 301 [facility] Resident Progress Note in Brief S: Patient seen and examined at bedside. Patient is awake and alert Pain is well controlled Denies cp/sob/n/v Pt has no other concerns at the time O: 
Visit Vitals /79 (BP 1 Location: Right arm, BP Patient Position: At rest;Supine) Pulse 87 Temp 98 °F (36.7 °C) Resp 18 Ht 5' 11\" (1.803 m) Wt 196 lb 3.2 oz (89 kg) SpO2 96% BMI 27.36 kg/m² Physical Examination:  
General appearance - alert, well appearing, and in no distress Chest - clear to auscultation, symmetric air entry Heart - normal rate, irregular rhythm, normal S1, S2 Abdomen - soft, nontender, nondistended Neurological - alert, oriented, normal speech, no focal findings Extremities - peripheral pulses normal, no pedal edema A/P:  
Lorie Jordan is a 70 y.o. admitted for A. flutter/A. fib Pt currently on 7.5 cardizem drip, HR 80s; he is comfortable and asymptomatic 
- Goal HR <110 
- Continue to monitor pt Please see daily progress note for detailed plan. Lorie Sheikh MD 
Family Medicine Resident

## 2018-12-02 LAB
ANION GAP SERPL CALC-SCNC: 11 MMOL/L (ref 5–15)
APTT PPP: 29.5 SEC (ref 22.1–32)
BUN SERPL-MCNC: 13 MG/DL (ref 6–20)
BUN/CREAT SERPL: 11 (ref 12–20)
CALCIUM SERPL-MCNC: 9.2 MG/DL (ref 8.5–10.1)
CHLORIDE SERPL-SCNC: 104 MMOL/L (ref 97–108)
CO2 SERPL-SCNC: 24 MMOL/L (ref 21–32)
CREAT SERPL-MCNC: 1.18 MG/DL (ref 0.7–1.3)
ERYTHROCYTE [DISTWIDTH] IN BLOOD BY AUTOMATED COUNT: 13.2 % (ref 11.5–14.5)
GLUCOSE SERPL-MCNC: 105 MG/DL (ref 65–100)
HCT VFR BLD AUTO: 43 % (ref 36.6–50.3)
HGB BLD-MCNC: 13.7 G/DL (ref 12.1–17)
MAGNESIUM SERPL-MCNC: 2 MG/DL (ref 1.6–2.4)
MCH RBC QN AUTO: 26.6 PG (ref 26–34)
MCHC RBC AUTO-ENTMCNC: 31.9 G/DL (ref 30–36.5)
MCV RBC AUTO: 83.3 FL (ref 80–99)
NRBC # BLD: 0 K/UL (ref 0–0.01)
NRBC BLD-RTO: 0 PER 100 WBC
PLATELET # BLD AUTO: 222 K/UL (ref 150–400)
PMV BLD AUTO: 9.9 FL (ref 8.9–12.9)
POTASSIUM SERPL-SCNC: 4.4 MMOL/L (ref 3.5–5.1)
RBC # BLD AUTO: 5.16 M/UL (ref 4.1–5.7)
SODIUM SERPL-SCNC: 139 MMOL/L (ref 136–145)
THERAPEUTIC RANGE,PTTT: NORMAL SECS (ref 58–77)
WBC # BLD AUTO: 5.1 K/UL (ref 4.1–11.1)

## 2018-12-02 PROCEDURE — 74011250637 HC RX REV CODE- 250/637: Performed by: FAMILY MEDICINE

## 2018-12-02 PROCEDURE — 36415 COLL VENOUS BLD VENIPUNCTURE: CPT

## 2018-12-02 PROCEDURE — 74011000250 HC RX REV CODE- 250: Performed by: NURSE PRACTITIONER

## 2018-12-02 PROCEDURE — 83735 ASSAY OF MAGNESIUM: CPT

## 2018-12-02 PROCEDURE — 85730 THROMBOPLASTIN TIME PARTIAL: CPT

## 2018-12-02 PROCEDURE — 80048 BASIC METABOLIC PNL TOTAL CA: CPT

## 2018-12-02 PROCEDURE — 85027 COMPLETE CBC AUTOMATED: CPT

## 2018-12-02 PROCEDURE — 65660000000 HC RM CCU STEPDOWN

## 2018-12-02 PROCEDURE — 74011000258 HC RX REV CODE- 258: Performed by: NURSE PRACTITIONER

## 2018-12-02 PROCEDURE — 74011250637 HC RX REV CODE- 250/637: Performed by: NURSE PRACTITIONER

## 2018-12-02 PROCEDURE — 74011250637 HC RX REV CODE- 250/637: Performed by: INTERNAL MEDICINE

## 2018-12-02 RX ORDER — TAMSULOSIN HYDROCHLORIDE 0.4 MG/1
0.4 CAPSULE ORAL DAILY
Status: DISCONTINUED | OUTPATIENT
Start: 2018-12-03 | End: 2018-12-03 | Stop reason: HOSPADM

## 2018-12-02 RX ADMIN — DILTIAZEM HYDROCHLORIDE 7.5 MG/HR: 5 INJECTION INTRAVENOUS at 14:08

## 2018-12-02 RX ADMIN — Medication 10 ML: at 16:02

## 2018-12-02 RX ADMIN — APIXABAN 5 MG: 5 TABLET, FILM COATED ORAL at 08:48

## 2018-12-02 RX ADMIN — DIGOXIN 0.12 MG: 125 TABLET ORAL at 08:48

## 2018-12-02 RX ADMIN — TAMSULOSIN HYDROCHLORIDE 0.4 MG: 0.4 CAPSULE ORAL at 08:50

## 2018-12-02 RX ADMIN — AMIODARONE HYDROCHLORIDE 400 MG: 200 TABLET ORAL at 08:48

## 2018-12-02 RX ADMIN — APIXABAN 5 MG: 5 TABLET, FILM COATED ORAL at 22:11

## 2018-12-02 RX ADMIN — Medication 10 ML: at 22:11

## 2018-12-02 RX ADMIN — Medication 10 ML: at 05:09

## 2018-12-02 RX ADMIN — METOPROLOL SUCCINATE 50 MG: 50 TABLET, FILM COATED, EXTENDED RELEASE ORAL at 08:48

## 2018-12-02 RX ADMIN — COLCHICINE 0.6 MG: 0.6 CAPSULE ORAL at 08:48

## 2018-12-02 NOTE — PROGRESS NOTES
Samira Schmid [address] Office [phone] Fax [phone] Assessment and Plan Christal Steven is a 70 y.o. male admitted for Atrial Fibrillation with RVR. He has spent 7 night(s) in the hospital. 
 
24 Hour Events: No acute events Atrial fibrillation with RVR: Unclear etiology; possibly 2/2 untreated RAMIREZ. CTA shows small pleural effusions but negative for aortic dissection, PE, or pericardial effusion. TSH wnl and UDS neg. BNP 2639 and CK-MB wnl. S/p failed cardioversion and [place]. BL LE Doppler: negative. Echo with LVEF 20% 
- CHARLIE: Left atrial appendage: No mass was identified. 
- Continue Eliquis - Continue Metoprolol 50mg daily and Digoxin 0.25mg daily - Cardiac MRI (11/29) showed LVEF 25% and RVEF 35%, no significant pericardial effusion, mild pericardial inflammation - Cariology following, appreciate recs: Colchicine for mild pericarditis, transfer to [facility] 12/2 or 12/3 in preparation for AF/AFL ablation to be performed Tuesday afternoon. Continue Eliquis throughout uninterrupted. 
  
Acute HFrEF: Newly detected this admission. ProBNP: 2639 (elevated). Echo reveals LVEF 20% with mild LA and RA dilation.  
-  Monitor fluid status closely. Strict I/Os, daily weights.  
- Continue BB Pleuritic Chest Pain (resolved): Arrhythmia vs GERD. ACS less likely given neg trop throughout admission. Received Fentanyl x1 but now avoiding due to low BPs. Received Toradol x2 and GI cocktail.  
-Continue Tylenol as needed Acute hypoxic respiratory failure 2/2 pulmonary edema: Improving, weaned from bipap to high flow to now NC. D-dimer: 1.4 (H), initial CTA neg for PE and on heparin gtt. BLE duplex neg for DVT. ABG: pH 7.44, pCO2 32 pO2 84. CXR: pulmonary edema and small pleural effusions - Wean O2 as tolerated to keep sats > 90% - Will diurese prn - Pulm following, appreciate recs: sleep eval as outpatient BPH 
-Will hold home flomax for now 
  
 RAMIREZ- May have led to arrhythmia.  
-Not on CPAP at home, needs sleep eval on d/c 
  
FEN/GI - Cardiac diet Activity - Out of bed with assistance DVT prophylaxis - Eliquis GI prophylaxis - Not indicated at this time Fall prophylaxis - Not indicated at this time. Disposition - Plan to transfer to [address] [place] [address] today or tomorrow for ablation scheduled for . Code Status - Full, discussed with patient  
  
Patient will be discussed with Dr. Marino Freeman (Attending Physician) Angela Matthew MD 
Family Medicine Resident Subjective / Objective Subjective Patient resting comfortably. Only complaint is mild chest pain with ambulation to bathroom, denies nausea or shortness of breath. Temp (24hrs), Av.2 °F (36.8 °C), Min:97.9 °F (36.6 °C), Max:98.7 °F (37.1 °C) Objective: 
Vital signs: (most recent): Blood pressure 103/58, pulse 80, temperature 97.9 °F (36.6 °C), resp. rate 21, height 5' 11\" (1.803 m), weight 192 lb 4.8 oz (87.2 kg), SpO2 94 %. Respiratory: O2 Flow Rate (L/min): 2 l/min O2 Device: Room air Visit Vitals /72 (BP 1 Location: Left arm, BP Patient Position: At rest) Pulse (!) 103 Temp 98.7 °F (37.1 °C) Resp 18 Ht 5' 11\" (1.803 m) Wt 189 lb 9.5 oz (86 kg) SpO2 96% BMI 26.44 kg/m² General: No acute distress. Alert. Cooperative. Head: Normocephalic. Atraumatic. Eyes:              Conjunctiva pink. Sclera white. Nose:             Mucosa pink. No drainage. Neck: Supple. Normal ROM. No stiffness. Respiratory: CTAB. Cardiovascular: Irregular rhythm. Normal S1,S2. No m/r/g. Pulses 2+ throughout. GI: Nontender. No rebound tenderness or guarding. Nondistended. Extremities: No LE edema. Distal pulses intact. Musculoskeletal: Full ROM in all extremities. Skin: Warm, dry. No rashes. I/O: 
Date 18 0700 - 18 0700 - 18 5651 Shift 4628-9766 0014-5149 24 Hour Total 1141-4015 8493-4294 24 Hour Total  
 INTAKE  
P.O. 1320  1320 P. O. 1320  1320     
I. V.(mL/kg/hr) 178.3(0.2)  178.3(0.1) Cardizem Volume 178. 3  178.3 Shift Total(mL/kg) [id number])  [id number]) OUTPUT Urine(mL/kg/hr) 1400(1.3) 1375(1.3) [id number]) Urine Voided 1400 1375 2775 Shift Total(mL/kg) 1400(16.1) [id number]) [id number]) NET 98.3 -1375 -1276.7 Weight (kg) 87.2 86 86 86 86 86 CBC: 
Recent Labs 12/02/18 
0459 12/01/18 
0140 11/30/18 
0645 WBC 5.1 5.2 5.4 HGB 13.7 13.4 13.6 HCT 43.0 42.4 42.6  202 224 Metabolic Panel: 
Recent Labs 12/02/18 
0459 12/01/18 
0140 11/30/18 
0645  139 138  
K 4.4 3.9 4.4  
 104 104 CO2 24 24 24 BUN 13 19 14 CREA 1.18 1.29 1.12  
* 117* 98  
CA 9.2 8.7 9.0 MG 2.0 1.8 2.1 PHOS  --  3.7  -- For Billing Chief Complaint Patient presents with  Irregular Heart Beat [facility] Problems  Date Reviewed: 8/8/2018 Codes Class Noted POA * (Principal) Atrial fibrillation with RVR ([facility] [address] ICD-10-CM: I48.91 
ICD-9-CM: 427.31  11/25/2018 Yes Hyperlipemia ICD-10-CM: E78.5 ICD-9-CM: 272.4  2/4/2011 Yes Pre-diabetes ICD-10-CM: R73.03 
ICD-9-CM: 790.29  1/6/2011 Yes RAMIREZ (obstructive sleep apnea) ICD-10-CM: G47.33 
ICD-9-CM: 327.23  5/17/2010 Yes Overview Signed 5/17/2010 10:47 AM by Dennise Hussein  
  cpap Seasonal allergic reaction ICD-10-CM: J30.2 ICD-9-CM: 477.9  5/17/2010 Yes Depression with anxiety ICD-10-CM: F41.8 ICD-9-CM: 300.4  5/17/2010 Yes

## 2018-12-02 NOTE — PROGRESS NOTES
SUBJECTIVE No acute events overnight. Remains on diltiazem drip. Ventricular rate controlled currently. ACTIVE PROBLEM LIST Patient Active Problem List  
 Diagnosis Date Noted  Atrial fibrillation with RVR ([address] 11/25/2018  Memory loss 09/04/2014  Lumbar radiculitis 07/18/2013  Hypogonadism male 04/14/2011  Hyperlipemia 02/04/2011  Pre-diabetes 01/06/2011  Sleep apnea 12/21/2010  RAMIREZ (obstructive sleep apnea) 05/17/2010  ED (erectile dysfunction) 05/17/2010  Hypogonadism 05/17/2010  Seasonal allergic reaction 05/17/2010  Depression with anxiety 05/17/2010 MEDICATIONS Current Facility-Administered Medications Medication Dose Route Frequency  [START ON 12/3/2018] tamsulosin (FLOMAX) capsule 0.4 mg  0.4 mg Oral DAILY  colchicine (MITIGARE) capsule 0.6 mg  0.6 mg Oral DAILY  apixaban (ELIQUIS) tablet 5 mg  5 mg Oral BID  dilTIAZem (CARDIZEM) 125 mg in dextrose 5% 125 mL infusion  0-15 mg/hr IntraVENous TITRATE  digoxin (LANOXIN) tablet 0.125 mg  0.125 mg Oral DAILY  amiodarone (CORDARONE) tablet 400 mg  400 mg Oral DAILY  metoprolol succinate (TOPROL-XL) XL tablet 50 mg  50 mg Oral DAILY  acetaminophen (TYLENOL) tablet 500 mg  500 mg Oral Q4H PRN  prochlorperazine (COMPAZINE) with saline injection 5 mg  5 mg IntraVENous Q6H PRN  
 sodium chloride (NS) flush 5-10 mL  5-10 mL IntraVENous Q8H  
 sodium chloride (NS) flush 5-10 mL  5-10 mL IntraVENous PRN  
  
 
 
PAST MEDICAL HISTORY Past Medical History:  
Diagnosis Date  Anxiety  BPH  Depression  ED (erectile dysfunction)  Hearing loss  Herniated disc  Hypogonadism  Sleep apnea PAST SURGICAL HISTORY Past Surgical History:  
Procedure Laterality Date  ECHO STRESS    
 HX HERNIA REPAIR    
 HX ORTHOPAEDIC    
 knee surgery ALLERGIES No Known Allergies FAMILY HISTORY Family History Problem Relation Age of Onset  Cancer Mother  Diabetes Father  Hypertension Father  Dementia Father  Cancer Sister   
 negative for cardiac disease SOCIAL HISTORY Social History Socioeconomic History  Marital status: LIFE PARTNER Spouse name: Not on file  Number of children: Not on file  Years of education: Not on file  Highest education level: Not on file Tobacco Use  Smoking status: Former Smoker  Smokeless tobacco: Never Used Substance and Sexual Activity  Alcohol use: Yes Alcohol/week: 3.0 oz Types: 6 Cans of beer per week Comment: social  
 Drug use: No  
 Sexual activity: Yes MEDICATIONS Current Facility-Administered Medications Medication Dose Route Frequency  [START ON 12/3/2018] tamsulosin (FLOMAX) capsule 0.4 mg  0.4 mg Oral DAILY  colchicine (MITIGARE) capsule 0.6 mg  0.6 mg Oral DAILY  apixaban (ELIQUIS) tablet 5 mg  5 mg Oral BID  dilTIAZem (CARDIZEM) 125 mg in dextrose 5% 125 mL infusion  0-15 mg/hr IntraVENous TITRATE  digoxin (LANOXIN) tablet 0.125 mg  0.125 mg Oral DAILY  amiodarone (CORDARONE) tablet 400 mg  400 mg Oral DAILY  metoprolol succinate (TOPROL-XL) XL tablet 50 mg  50 mg Oral DAILY  acetaminophen (TYLENOL) tablet 500 mg  500 mg Oral Q4H PRN  prochlorperazine (COMPAZINE) with saline injection 5 mg  5 mg IntraVENous Q6H PRN  
 sodium chloride (NS) flush 5-10 mL  5-10 mL IntraVENous Q8H  
 sodium chloride (NS) flush 5-10 mL  5-10 mL IntraVENous PRN I have reviewed the nurses notes, vitals, problem list, allergy list, medical history, family, social history and medications. REVIEW OF SYMPTOMS General: Pt denies excessive weight gain or loss. Pt is able to conduct ADL's HEENT: Denies blurred vision, headaches, hearing loss, epistaxis and difficulty swallowing. Respiratory: Denies cough, congestion, shortness of breath, MATHEW, wheezing or stridor. Cardiovascular: Denies precordial pain, palpitations, edema or PND Gastrointestinal: Denies poor appetite, indigestion, abdominal pain or blood in stool Genitourinary: Denies hematuria, dysuria, increased urinary frequency Musculoskeletal: Denies joint pain or swelling from muscles or joints Neurologic: Denies tremor, paresthesias, headache, or sensory motor disturbance Psychiatric: Denies confusion, insomnia, depression Integumentray: Denies rash, itching or ulcers. Hematologic: Denies easy bruising, bleeding PHYSICAL EXAMINATION Vitals:  
 12/02/18 1123 12/02/18 1200 12/02/18 1405 12/02/18 1600 BP: 98/86 110/78  (!) 137/93 Pulse: 87 73 91 70 Resp: 18 21 18 Temp: 98.5 °F (36.9 °C) SpO2: 92% 94%  (!) 89% Weight:      
Height:      
 
General: Well developed, in no acute distress. HEENT: No jaundice, oral mucosa moist, no oral ulcers Neck: Supple, no stiffness, no lymphadenopathy, supple Heart: Irregularly irregular, no murmur, gallop or rub, no jugular venous distention Respiratory: Clear bilaterally x 4, no wheezing or rales Abdomen:   Soft, non-tender, bowel sounds are active.  
Extremities:  No edema, normal cap refill, no cyanosis. Musculoskeletal: No clubbing, no deformities Neuro: A&Ox3, speech clear, gait stable, cooperative, no focal neurologic deficits Skin: Skin color is normal. No rashes or lesions. Non diaphoretic, moist. 
Vascular: 2+ pulses symmetric in all extremities DIAGNOSTIC DATA TELEMETRY: Atrial fibrillation with heart rates in the 80s-90s LABORATORY DATA Lab Results Component Value Date/Time WBC 5.1 12/02/2018 04:59 AM  
 HGB 13.7 12/02/2018 04:59 AM  
 HCT 43.0 12/02/2018 04:59 AM  
 PLATELET 095 53/90/1843 04:59 AM  
 MCV 83.3 12/02/2018 04:59 AM  
  
Lab Results Component Value Date/Time  Sodium 139 12/02/2018 04:59 AM  
 Potassium 4.4 12/02/2018 04:59 AM  
 Chloride 104 12/02/2018 04:59 AM  
 CO2 24 12/02/2018 04:59 AM  
 Anion gap 11 12/02/2018 04:59 AM  
 Glucose 105 (H) 12/02/2018 04:59 AM  
 BUN 13 12/02/2018 04:59 AM  
 Creatinine 1.18 12/02/2018 04:59 AM  
 BUN/Creatinine ratio 11 (L) 12/02/2018 04:59 AM  
 GFR est AA >60 12/02/2018 04:59 AM  
 GFR est non-AA >60 12/02/2018 04:59 AM  
 Calcium 9.2 12/02/2018 04:59 AM  
 Bilirubin, total 1.1 (H) 11/25/2018 04:58 PM  
 AST (SGOT) 26 11/25/2018 04:58 PM  
 Alk. phosphatase 54 11/25/2018 04:58 PM  
 Protein, total 7.2 11/25/2018 04:58 PM  
 Albumin 3.7 11/25/2018 04:58 PM  
 Globulin 3.5 11/25/2018 04:58 PM  
 A-G Ratio 1.1 11/25/2018 04:58 PM  
 ALT (SGPT) 67 11/25/2018 04:58 PM  
  
 
 
 ASSESSMENT 1. Atrial fibrillation A. Persistent B. Failed cardioversion x2 
2. Atrial flutter 3. Chest pain 4. Cardiomyopathy 5. RAMIREZ 6. Pericarditis PLAN Transfer to [address] for AF ablation when bed available. Angela Leigh MD 
Cardiac Electrophysiology / Cardiology [address] [place] [address], [address] 
[phone] / [phone] Fax   [phone] / [phone] Fax

## 2018-12-02 NOTE — PROGRESS NOTES
Problem: Falls - Risk of 
Goal: *Absence of Falls Document Miguel Angel Jj Fall Risk and appropriate interventions in the flowsheet. Outcome: Progressing Towards Goal 
Fall Risk Interventions: 
  
 
  
 
Medication Interventions: Patient to call before getting OOB History of Falls Interventions: Room close to nurse's station

## 2018-12-02 NOTE — PROGRESS NOTES
SHIFT CHANGE: 
1930 Bedside and Verbal shift change report given to Madie LOVELL (oncoming nurse) by Aicha Blankenship (offgoing nurse). Report included the following information SBAR, Kardex, MAR and Recent Results. SHIFT SUMMARY: 
Patient had a restful evening. HR controlled, VS stable. No new issues to report. END OF SHIFT REPORT: 
0730  Bedside and Verbal shift change report given to Lianna Choi (oncoming nurse) by Monica French (offgoing nurse). Report included the following information SBAR, Kardex, MAR and Recent Results.

## 2018-12-02 NOTE — PROGRESS NOTES
SHIFT CHANGE: 
7:38 AM Report received from Awais Gibbons RN. SBAR, Kardex, Intake/Output, MAR, Recent Results, Med Rec Status and Cardiac Rhythm AFLUTTER were discussed. SHIFT SUMMARY: 
 
 
 
END OF SHIFT REPORT: 
7:30 PM  Bedside and Verbal shift change report given to Awais Gibbons RN (oncoming nurse) by Rama Tejeda RN (offgoing nurse). Report included the following information Kardex, Procedure Summary, Intake/Output, MAR, Recent Results, Med Rec Status and Cardiac Rhythm AFLUTTER.

## 2018-12-03 ENCOUNTER — HOSPITAL ENCOUNTER (INPATIENT)
Age: 63
LOS: 2 days | Discharge: HOME | DRG: 273 | End: 2018-12-05
Attending: HOSPITALIST | Admitting: FAMILY MEDICINE
Payer: COMMERCIAL

## 2018-12-03 VITALS
SYSTOLIC BLOOD PRESSURE: 132 MMHG | TEMPERATURE: 98.7 F | OXYGEN SATURATION: 98 % | BODY MASS INDEX: 26.39 KG/M2 | RESPIRATION RATE: 16 BRPM | HEIGHT: 71 IN | WEIGHT: 188.49 LBS | HEART RATE: 107 BPM | DIASTOLIC BLOOD PRESSURE: 78 MMHG

## 2018-12-03 PROBLEM — D48.1 ABDOMINAL FIBROMATOSIS: Status: ACTIVE | Noted: 2018-12-03

## 2018-12-03 PROBLEM — I48.91 ATRIAL FIBRILLATION (HCC): Status: ACTIVE | Noted: 2018-12-03

## 2018-12-03 LAB
ANION GAP SERPL CALC-SCNC: 11 MMOL/L (ref 5–15)
APTT PPP: 29 SEC (ref 22.1–32)
BUN SERPL-MCNC: 18 MG/DL (ref 6–20)
BUN/CREAT SERPL: 15 (ref 12–20)
CALCIUM SERPL-MCNC: 9 MG/DL (ref 8.5–10.1)
CHLORIDE SERPL-SCNC: 103 MMOL/L (ref 97–108)
CO2 SERPL-SCNC: 24 MMOL/L (ref 21–32)
CREAT SERPL-MCNC: 1.19 MG/DL (ref 0.7–1.3)
ERYTHROCYTE [DISTWIDTH] IN BLOOD BY AUTOMATED COUNT: 13.3 % (ref 11.5–14.5)
GLUCOSE SERPL-MCNC: 96 MG/DL (ref 65–100)
HCT VFR BLD AUTO: 44.3 % (ref 36.6–50.3)
HGB BLD-MCNC: 14.1 G/DL (ref 12.1–17)
MAGNESIUM SERPL-MCNC: 2 MG/DL (ref 1.6–2.4)
MCH RBC QN AUTO: 26.7 PG (ref 26–34)
MCHC RBC AUTO-ENTMCNC: 31.8 G/DL (ref 30–36.5)
MCV RBC AUTO: 83.9 FL (ref 80–99)
NRBC # BLD: 0 K/UL (ref 0–0.01)
NRBC BLD-RTO: 0 PER 100 WBC
PLATELET # BLD AUTO: 209 K/UL (ref 150–400)
PMV BLD AUTO: 10.4 FL (ref 8.9–12.9)
POTASSIUM SERPL-SCNC: 4.2 MMOL/L (ref 3.5–5.1)
RBC # BLD AUTO: 5.28 M/UL (ref 4.1–5.7)
SODIUM SERPL-SCNC: 138 MMOL/L (ref 136–145)
THERAPEUTIC RANGE,PTTT: NORMAL SECS (ref 58–77)
WBC # BLD AUTO: 5.9 K/UL (ref 4.1–11.1)

## 2018-12-03 PROCEDURE — 80048 BASIC METABOLIC PNL TOTAL CA: CPT

## 2018-12-03 PROCEDURE — 74011000250 HC RX REV CODE- 250: Performed by: HOSPITALIST

## 2018-12-03 PROCEDURE — 74011250637 HC RX REV CODE- 250/637: Performed by: STUDENT IN AN ORGANIZED HEALTH CARE EDUCATION/TRAINING PROGRAM

## 2018-12-03 PROCEDURE — 74011000258 HC RX REV CODE- 258: Performed by: NURSE PRACTITIONER

## 2018-12-03 PROCEDURE — 36415 COLL VENOUS BLD VENIPUNCTURE: CPT

## 2018-12-03 PROCEDURE — 74011250637 HC RX REV CODE- 250/637: Performed by: HOSPITALIST

## 2018-12-03 PROCEDURE — 83735 ASSAY OF MAGNESIUM: CPT

## 2018-12-03 PROCEDURE — 74011250637 HC RX REV CODE- 250/637: Performed by: NURSE PRACTITIONER

## 2018-12-03 PROCEDURE — 65660000000 HC RM CCU STEPDOWN

## 2018-12-03 PROCEDURE — 94760 N-INVAS EAR/PLS OXIMETRY 1: CPT

## 2018-12-03 PROCEDURE — 74011000250 HC RX REV CODE- 250: Performed by: NURSE PRACTITIONER

## 2018-12-03 PROCEDURE — 85730 THROMBOPLASTIN TIME PARTIAL: CPT

## 2018-12-03 PROCEDURE — 85027 COMPLETE CBC AUTOMATED: CPT

## 2018-12-03 PROCEDURE — 74011250637 HC RX REV CODE- 250/637: Performed by: INTERNAL MEDICINE

## 2018-12-03 PROCEDURE — 74011000258 HC RX REV CODE- 258: Performed by: HOSPITALIST

## 2018-12-03 RX ORDER — SODIUM CHLORIDE 0.9 % (FLUSH) 0.9 %
5-10 SYRINGE (ML) INJECTION AS NEEDED
Status: DISCONTINUED | OUTPATIENT
Start: 2018-12-03 | End: 2018-12-05 | Stop reason: HOSPADM

## 2018-12-03 RX ORDER — ACETAMINOPHEN 500 MG
500 TABLET ORAL
Status: DISCONTINUED | OUTPATIENT
Start: 2018-12-03 | End: 2018-12-03 | Stop reason: SDUPTHER

## 2018-12-03 RX ORDER — METOPROLOL SUCCINATE 50 MG/1
50 TABLET, EXTENDED RELEASE ORAL DAILY
Status: DISCONTINUED | OUTPATIENT
Start: 2018-12-04 | End: 2018-12-05 | Stop reason: HOSPADM

## 2018-12-03 RX ORDER — SODIUM CHLORIDE 0.9 % (FLUSH) 0.9 %
5-10 SYRINGE (ML) INJECTION EVERY 8 HOURS
Status: DISCONTINUED | OUTPATIENT
Start: 2018-12-03 | End: 2018-12-03 | Stop reason: SDUPTHER

## 2018-12-03 RX ORDER — TAMSULOSIN HYDROCHLORIDE 0.4 MG/1
0.4 CAPSULE ORAL DAILY
Status: DISCONTINUED | OUTPATIENT
Start: 2018-12-04 | End: 2018-12-05 | Stop reason: HOSPADM

## 2018-12-03 RX ORDER — COLCHICINE 0.6 MG/1
0.6 TABLET ORAL DAILY
Status: DISCONTINUED | OUTPATIENT
Start: 2018-12-03 | End: 2018-12-05 | Stop reason: HOSPADM

## 2018-12-03 RX ORDER — SODIUM CHLORIDE 0.9 % (FLUSH) 0.9 %
5-10 SYRINGE (ML) INJECTION EVERY 8 HOURS
Status: DISCONTINUED | OUTPATIENT
Start: 2018-12-03 | End: 2018-12-05 | Stop reason: HOSPADM

## 2018-12-03 RX ORDER — SODIUM CHLORIDE 0.9 % (FLUSH) 0.9 %
5-10 SYRINGE (ML) INJECTION AS NEEDED
Status: DISCONTINUED | OUTPATIENT
Start: 2018-12-03 | End: 2018-12-03 | Stop reason: SDUPTHER

## 2018-12-03 RX ORDER — ALPRAZOLAM 0.25 MG/1
0.25 TABLET ORAL
Status: DISCONTINUED | OUTPATIENT
Start: 2018-12-03 | End: 2018-12-05 | Stop reason: HOSPADM

## 2018-12-03 RX ORDER — AMIODARONE HYDROCHLORIDE 200 MG/1
400 TABLET ORAL DAILY
Status: DISCONTINUED | OUTPATIENT
Start: 2018-12-04 | End: 2018-12-04

## 2018-12-03 RX ORDER — ACETAMINOPHEN 325 MG/1
650 TABLET ORAL
Status: DISCONTINUED | OUTPATIENT
Start: 2018-12-03 | End: 2018-12-05 | Stop reason: HOSPADM

## 2018-12-03 RX ORDER — DIGOXIN 125 MCG
0.12 TABLET ORAL DAILY
Status: DISCONTINUED | OUTPATIENT
Start: 2018-12-04 | End: 2018-12-04

## 2018-12-03 RX ADMIN — Medication 10 ML: at 05:44

## 2018-12-03 RX ADMIN — DILTIAZEM HYDROCHLORIDE 7.5 MG/HR: 5 INJECTION INTRAVENOUS at 05:43

## 2018-12-03 RX ADMIN — APIXABAN 5 MG: 5 TABLET, FILM COATED ORAL at 20:41

## 2018-12-03 RX ADMIN — Medication 10 ML: at 14:30

## 2018-12-03 RX ADMIN — ALPRAZOLAM 0.25 MG: 0.25 TABLET ORAL at 14:30

## 2018-12-03 RX ADMIN — ALPRAZOLAM 0.25 MG: 0.25 TABLET ORAL at 23:39

## 2018-12-03 RX ADMIN — COLCHICINE 0.6 MG: 0.6 TABLET, FILM COATED ORAL at 11:39

## 2018-12-03 RX ADMIN — METOPROLOL SUCCINATE 50 MG: 50 TABLET, FILM COATED, EXTENDED RELEASE ORAL at 08:18

## 2018-12-03 RX ADMIN — DILTIAZEM HYDROCHLORIDE 7.5 MG/HR: 5 INJECTION, SOLUTION INTRAVENOUS at 11:29

## 2018-12-03 RX ADMIN — TAMSULOSIN HYDROCHLORIDE 0.4 MG: 0.4 CAPSULE ORAL at 08:18

## 2018-12-03 RX ADMIN — Medication 10 ML: at 20:45

## 2018-12-03 RX ADMIN — DIGOXIN 0.12 MG: 125 TABLET ORAL at 08:18

## 2018-12-03 RX ADMIN — AMIODARONE HYDROCHLORIDE 400 MG: 200 TABLET ORAL at 08:18

## 2018-12-03 RX ADMIN — APIXABAN 5 MG: 5 TABLET, FILM COATED ORAL at 08:18

## 2018-12-03 NOTE — H&P
History & Physical 
 
Primary Care Provider: Bud Major DO Source of Information: Patient History of Presenting Illness:  
Vincenzo Canales is a 70 y.o. male who presents with A fib and tx from [facility] for ablation. reviewing chart and from pt unsuccessful [facility] x 3 and Dr. Bernard Pelaez wants him to be tx here at [facility] for further MGMT. Pt has no complains received with  titrated Cardizem drip. He is  Scheduled for for Afib/flutter ablationn tomorrow morning. The patient denies any fever, chills, chest pain, cough, congestion, recent illness, palpitations, or dysuria. Review of Systems: A comprehensive review of systems was negative. Past Medical History:  
Diagnosis Date  Anxiety  BPH  Depression  ED (erectile dysfunction)  Hearing loss  Herniated disc  Hypogonadism  Sleep apnea Past Surgical History:  
Procedure Laterality Date  ECHO STRESS    
 HX HERNIA REPAIR    
 HX ORTHOPAEDIC    
 knee surgery Prior to Admission medications Medication Sig Start Date End Date Taking? Authorizing Provider  
colchicine (MITIGARE) 0.6 mg capsule Take 1 Cap by mouth daily. 12/2/18   Burak Christensen MD  
acetaminophen (TYLENOL) 500 mg tablet Take 1 Tab by mouth every four (4) hours as needed. 12/1/18   Burak Christensen MD  
amiodarone (PACERONE) 400 mg tablet Take 1 Tab by mouth three (3) times daily. 11/29/18   Augusto Newman MD  
amiodarone (PACERONE) 400 mg tablet Take 1 Tab by mouth daily. 12/1/18   Augusto Newman MD  
apixaban (ELIQUIS) 5 mg tablet Take 1 Tab by mouth two (2) times a day. 11/29/18   Augusto Newman MD  
digoxin (LANOXIN) 0.125 mg tablet Take 1 Tab by mouth daily. 11/30/18   Augusto Newman MD  
dilTIAZem (CARDIZEM) infusion 0-15 mg/hr by IntraVENous route TITRATE. 11/29/18   Augusto Newman MD  
metoprolol succinate (TOPROL-XL) 50 mg XL tablet Take 1 Tab by mouth daily.  11/30/18   Augusto Newman MD  
 tamsulosin (FLOMAX) 0.4 mg capsule Take 0.4 mg by mouth daily as needed (trouble urinating). Provider, Historical  
ibuprofen (MOTRIN) 200 mg tablet Take 200 mg by mouth every eight (8) hours as needed for Pain. Provider, Historical  
 
No Known Allergies Family History Problem Relation Age of Onset  Cancer Mother  Diabetes Father  Hypertension Father  Dementia Father  Cancer Sister SOCIAL HISTORY: 
Patient resides: 
Independently x [facility]isted [facility] With family care Smoking history:  
None x Former Chronic Alcohol history:  
None x Social   
Chronic Ambulates:  
Independently x  
w/cane   
w/walker   
w/wc CODE STATUS: 
DNR Full x Other Objective:  
 
Physical Exam:  
 
Visit Vitals /84 (BP 1 Location: Left arm, BP Patient Position: At rest) Pulse 98 Temp 98.4 °F (36.9 °C) Resp 18 Wt 86.6 kg (190 lb 14.7 oz) SpO2 98% BMI 26.63 kg/m² O2 Device: Room air General:  Alert, cooperative, no distress, appears stated age. Head:  Normocephalic, without obvious abnormality, atraumatic. Eyes:  Conjunctivae/corneas clear. PERRL, EOMs intact. Nose: Nares normal. Septum midline. Throat: Lips, mucosa, and tongue normal. Teeth and gums normal.  
Neck: Supple, symmetrical, trachea midline no carotid bruit and no JVD. Lungs:   Clear to auscultation bilaterally. Heart:  Ir Regular rate and rhythm, S1, S2 normal, no murmur, click, rub or gallop. Abdomen:   Soft, non-tender. Bowel sounds normal. No masses, Extremities: Extremities normal, atraumatic, no cyanosis or edema. Pulses: 2+ and symmetric all extremities. Skin: Skin color, texture, turgor normal. No rashes or lesions Neurologic: CNII-XII intact. EKG:  atrial fibrillation. Data Review:  
 
Recent Days: 
Recent Labs 12/03/18 
0150 12/02/18 
0459 12/01/18 
0140 WBC 5.9 5.1 5.2 HGB 14.1 13.7 13.4 HCT 44.3 43.0 42.4  222 202 Recent Labs 12/03/18 
0150 12/02/18 
0459 12/01/18 
0140  139 139  
K 4.2 4.4 3.9  104 104 CO2 24 24 24 GLU 96 105* 117* BUN 18 13 19 CREA 1.19 1.18 1.29  
CA 9.0 9.2 8.7 MG 2.0 2.0 1.8 PHOS  --   --  3.7 No results for input(s): PH, PCO2, PO2, HCO3, FIO2 in the last 72 hours. 24 Hour Results: 
Recent Results (from the past 24 hour(s)) PTT Collection Time: 12/03/18  1:50 AM  
Result Value Ref Range aPTT 29.0 22.1 - 32.0 sec  
 aPTT, therapeutic range     58.0 - 77.0 SECS  
CBC W/O DIFF Collection Time: 12/03/18  1:50 AM  
Result Value Ref Range WBC 5.9 4.1 - 11.1 K/uL  
 RBC 5.28 4.10 - 5.70 M/uL  
 HGB 14.1 12.1 - 17.0 g/dL HCT 44.3 36.6 - 50.3 % MCV 83.9 80.0 - 99.0 FL  
 MCH 26.7 26.0 - 34.0 PG  
 MCHC 31.8 30.0 - 36.5 g/dL  
 RDW 13.3 11.5 - 14.5 % PLATELET 575 139 - 365 K/uL MPV 10.4 8.9 - 12.9 FL  
 NRBC 0.0 0  WBC ABSOLUTE NRBC 0.00 0.00 - 0.01 K/uL MAGNESIUM Collection Time: 12/03/18  1:50 AM  
Result Value Ref Range Magnesium 2.0 1.6 - 2.4 mg/dL METABOLIC PANEL, BASIC Collection Time: 12/03/18  1:50 AM  
Result Value Ref Range Sodium 138 136 - 145 mmol/L Potassium 4.2 3.5 - 5.1 mmol/L Chloride 103 97 - 108 mmol/L  
 CO2 24 21 - 32 mmol/L Anion gap 11 5 - 15 mmol/L Glucose 96 65 - 100 mg/dL BUN 18 6 - 20 MG/DL Creatinine 1.19 0.70 - 1.30 MG/DL  
 BUN/Creatinine ratio 15 12 - 20 GFR est AA >60 >60 ml/min/1.73m2 GFR est non-AA >60 >60 ml/min/1.73m2 Calcium 9.0 8.5 - 10.1 MG/DL Imaging:  
 
Assessment:  
 
Principal Problem: 
  Atrial fibrillation with RVR ([address] (11/25/2018) Active Problems: 
  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 Atrial fibrillation/flutter with rapid ventricular response: - Ablation per cardiology  
- conts on cardizem gtt at low dose to keep HR <110 bpm. Loaded w/ amiodarone, cont PO taper 
-CHARLIE showed no EDVENDRA thrombus. DCCV 11/27/18 with 360 J was not successful. 
- cont eliquis 5 mg bid  
-cardiac MRI done PV mapping 2. Non-ischemic CM: cardiac cath shows no CAD 3. Acute on chronic CHF NYHA class III, new onset: EF 25% per echo.  
-cont BB, no ACE. Shannan Schuster d/t hypotension  
-PO digoxin -dig level 0.6 4. Sleep apnea: Currently does not use CPAP for last many years. 5. DVT - On eliquis Signed By: Shun Givens MD   
 December 3, 2018

## 2018-12-03 NOTE — PROGRESS NOTES
0920: Pt directly admitted into [facility] [place] room 450 from [facility]. No complaints of pain at this time. VSS.

## 2018-12-03 NOTE — PROGRESS NOTES
Cardiology Progress Note [address] [facility]. [address], [place], [address] [facility] Phone [phone]; Fax [phone] 
 
 
 
12/3/2018 8:19 AM  
 
Admit Date:           2018 Admit Diagnosis:  Atrial fibrillation with RVR ([address] :          1955 MRN:          [id number] ASSESSMENT/RECOMMENDATION:  
Atrial fibrillation/flutter with rapid ventricular response: conts on cardizem gtt at low dose to keep HR <110 bpm. Loaded w/ amiodarone, cont PO taper 
-unsuccessful DCC (3rd attempt this admission) -CHARLIE showed no DEVENDRA thrombus. DCCV 18 with 360 J was not successful. 
- cont eliquis 5 mg bid (DO NOT hold for ablation) 
-cardiac MRI done PV mapping (for AF ablation) Transfer to [facility] on Monday for Afib/flutter ablation this morning -reviewed ablation procedure in detail  
  
Non-ischemic CM: cardiac cath shows no CAD 
  
Acute on chronic CHF NYHA class III, new onset: EF 25% per echo.  
-cont BB, no ACE. Renan Creed d/t hypotension  
-PO digoxin -dig level 0.6  
-flash pulmonary edema  - 
  
Chest pain: resolved. -The pleuritic chest pain is of unclear etiology, suspect this is pericarditis. Would avoid NSAIDs in the setting of acute CHF/starting OAC. Can use Toradol.  
-chest CTA negative for aortic aneurysm or dissection. 
- Le dopplers negative  
  
Sleep apnea: Currently does not use CPAP for last many years.  Need to encourage him to start using his CPAP again. 
-follow up with sleep clinic OP No intake/output data recorded. Last 3 Recorded Weights in this Encounter 18 0700 18 0451 18 0550 Weight: 192 lb 4.8 oz (87.2 kg) 189 lb 9.5 oz (86 kg) 188 lb 7.9 oz (85.5 kg)  1901 -  0700 In: 1440 [P.O.:1440] Out: 8460 Mayito SWAIN  
  
  
 
 
 
 Ben Ding III denies palpitations, irregular heart beat, SOB, chest pain or LE edema. No lightheadedness or dizziness Current Facility-Administered Medications Medication Dose Route Frequency  tamsulosin (FLOMAX) capsule 0.4 mg  0.4 mg Oral DAILY  colchicine (MITIGARE) capsule 0.6 mg  0.6 mg Oral DAILY  apixaban (ELIQUIS) tablet 5 mg  5 mg Oral BID  dilTIAZem (CARDIZEM) 125 mg in dextrose 5% 125 mL infusion  0-15 mg/hr IntraVENous TITRATE  digoxin (LANOXIN) tablet 0.125 mg  0.125 mg Oral DAILY  amiodarone (CORDARONE) tablet 400 mg  400 mg Oral DAILY  metoprolol succinate (TOPROL-XL) XL tablet 50 mg  50 mg Oral DAILY  acetaminophen (TYLENOL) tablet 500 mg  500 mg Oral Q4H PRN  prochlorperazine (COMPAZINE) with saline injection 5 mg  5 mg IntraVENous Q6H PRN  
 sodium chloride (NS) flush 5-10 mL  5-10 mL IntraVENous Q8H  
 sodium chloride (NS) flush 5-10 mL  5-10 mL IntraVENous PRN OBJECTIVE Intake/Output Summary (Last 24 hours) at 12/3/2018 0280 Last data filed at 12/3/2018 6448 Gross per 24 hour Intake 1440 ml Output 1700 ml Net -260 ml Review of Systems - History obtained from the patient AS PER  HPI Telemetry A-flutter V rate 90- 100's PHYSICAL EXAM  
  
 
Visit Vitals /78 (BP 1 Location: Left arm, BP Patient Position: Sitting) Pulse (!) 107 Temp 98.7 °F (37.1 °C) Resp 16 Ht 5' 11\" (1.803 m) Wt 188 lb 7.9 oz (85.5 kg) SpO2 98% BMI 26.29 kg/m² Gen: Well-developed, well-nourished, in no acute distress  alert and oriented x 3 HEENT:  Pink conjunctivae, Hearing grossly normal.No scleral icterus or conjunctival, moist mucous membranes Neck: Supple,No JVD Resp: No accessory muscle use, Clear breath sounds, No rales or rhonchi 
Card: irregular Rate,Rythm, No murmurs, rubs or gallop. No thrills. GI:          soft, non-tender MSK: No cyanosis or clubbing, good capillary refill Skin: No rashes or ulcers, no bruising Neuro:  Cranial nerves are grossly intact, moving all four extremities, no focal deficit, follows commands appropriately Psych:  Good insight, oriented to person, place and time, alert, Nml Affect LE: No edema DATA REVIEW Laboratory and Imaging have been reviewed by me and are notable for No results for input(s): CPK, CKMB, TROIQ in the last 72 hours. Recent Labs 12/03/18 
0150 12/02/18 
0459 12/01/18 
0140  139 139  
K 4.2 4.4 3.9 CO2 24 24 24 BUN 18 13 19 CREA 1.19 1.18 1.29  
GLU 96 105* 117* PHOS  --   --  3.7 MG 2.0 2.0 1.8 WBC 5.9 5.1 5.2 HGB 14.1 13.7 13.4 HCT 44.3 43.0 42.4  222 202 Selwyn Escoto, NP

## 2018-12-03 NOTE — PROGRESS NOTES
Shift Summary 12/3/2018 
[id number] 
 
0700 Pt awake at time of bedside shift report received from [address] Focused assessment complete; pt assisted into new gown. Pt ready for tx to [facility] at 0800 via [facility]. Pt signed EMTALA and is ready for transport. Report called to [facility] by night shift RN Corrine Myers. 0800 Brief report given to [facility] transport medics. EMTALA has been signed by all necessary parties. 0820 [facility] at bedside for transport to [facility]. . Pt has had breakfast and received PO meds as per Dr. Madelyn Cooks request and is ready for transport.

## 2018-12-03 NOTE — DISCHARGE SUMMARY
[address]                                                                                TRANSFER SUMMARY     Patient: Jordyn Gonzalez  MRN: [id number]  YOB: 1955  Age: 70 y.o. Date of admission:  11/25/2018  Date of discharge:  12/3/2018  Primary care provider:  Robert Oropeza DO   Admitting provider:  Carlos Abbasi MD    Discharging provider(s): Lucy Dunlap MD - Family Medicine Resident  Uriah Kraus MD -  Family Medicine Attending      Consultations:  · CARDIOLOGY: Angela Leigh  · INTENSIVIST: Sterling Pagan    Procedures:  · TTE and CHARLIE    Chief Complaint:   · Chest pain    Transfer destination: [address].  The patient is stable for transfer. Admission diagnosis:  Atrial fibrillation with RVR (HCC)    HPI:   As per admitting provider:  Dian Gonzales III is a 70 y.o. male with PMH of BPH, RAMIREZ non compliant with CPAP who presents to the ER complaining of chest pain. Patient states it began last night and continued into this morning, worsening around 4am, patient initially just thought it was heartburn. Patient then went to an urgent care center, where they stated he was showing a tachyarrhythmia on EKG, and he was sent to [facility] ED. Patient denies smoking, drinks 1-2 beers a week, and denies illicit drug use. No Hx of thyroid issues. Does not use a CPAP for his sleep apnea. No other symptoms of lightheadedness, dizziness, numbness/tingling, or heart racing. Does complain of intermittent shortness of breath. Describes chest pain as sharp and mid sternal and it comes in waves, hurts to breathe in deep. \"       Final discharge diagnoses and brief hospital course:     Hospital Course   Atrial fibrillation/ flutter with RVR: Atrial fibrillation with rapid ventricular response seen on EKG on admission with unclear etiology; possibly secondary to untreated RAMIREZ. Cardiology consulted.  CTA showed small pleural effusions but negative for aortic dissection, PE, or pericardial effusion. TSH wnl and UDS neg. BNP 2639. CK-MB wnl. CHARLIE showed left atrial appendage, no mass was identified. Patient failed chemical cardioversion and DCCV on three occassions. His rate was not controlled on Cardizem drip, Amiodorane and Digoxin. Patient started on Eliquis 5mg BID following Heparin drip. Per Dr. Louise Lockwood, patient to be transferred to [facility] for further cardiac work up for rate control and possible atrial ablation. Mild pericarditis: Found on Cardiac MRI. He was started on Colchicine 0.6mg daily. Acute HFrEF: Newly detected this admission. ProBNP: 2639 (elevated). Echo revealed LVEF 20% with mild LA and RA dilation. Monitor fluid status closely. Strict I/Os, daily weights. Continue Metropolol 50mg daily.      Pleuritic Chest Pain (resolved): Patient presented with pleuritic chest pain likely secondary to arrhythmia vs GERD. Troponins were negative x3. Patient asymptomatic during hospital stay. Patient to receive Tylenol as needed. Acute hypoxic respiratory failure secondary to pulmonary edema: Patient developed shortness of breath on evening of 11/27 requiring BIPAP. CTA negative for PE and BLE duplex negative for a DVT. A chest xray showed pulmonary edema and small pleural effusions. Patient improved following diuresis and weaned off oxygen within the day. He continues to use High Flow at night and will require a sleep study follow up as outpatient for RAMIREZ.      BPH: Hold home Flomax, resume on discharge.      RAMIREZ: Patient not on CPAP at home, needs sleep evaluation as outpatient. Continue BIPAP or High Flow as tolerated at night.        Follow-up Cares:   · Pending LABS at the time of Discharge: none  · Labs Need to Repeat by PCP: None    Follow-up Information     Follow up With Specialties Details Why [address] [place] [address]   CHF management visit [address]  [facility] [address] [facility] Pertinent Imaging Studies:     Xr Chest Port    Result Date: 11/26/2018  INDICATION:    dyspnea EXAMINATION:  AP CHEST, PORTABLE COMPARISON: 11/25/2018 FINDINGS: Single AP portable view of the chest at 2211 hours demonstrates unchanged cardiomediastinal silhouette. Lungs are adequately expanded with development of mild pulmonary edema and small pleural effusions. No pneumothorax. There is no new airspace disease. IMPRESSION: Development of pulmonary edema and small pleural effusions. Xr Chest Port    Result Date: 11/25/2018  EXAM: Portable CXR. 1741 hours  INDICATION: dyspnea FINDINGS: The lungs are clear. Heart is normal in size. There is no overt pulmonary edema. There is no evident pneumothorax, adenopathy or pleural effusion. IMPRESSION: No Acute Disease. Mri Cardiac Morph Func W Wo Cont    Result Date: 11/30/2018  CARDIOVASCULAR MRI INDICATION: PV mapping w gadolinium PROCEDURAL DATA: CPT Codes: 48426 SCANS PERFORMED: Spin Echo: Axial. FIESTA/SSFP Rest Perfusion LGE Contrast Agent: Magnevist.  Contrast Dose: 40ml. Impression: 1. All pulmonary veins were visualized draining into the left atrium. There is no sinus venosus defect. There is no persistent left SVC. There is no accessory pulmonary vein ostia. The esophagus courses behind the left atrium away from the pulmonary vein ostia. There is no left atrial appendage thrombus. The individual pulmonary vein ostial dimensions are as follows: Right upper pulmonary vein: 21 x 24 mm. Right lower pulmonary vein: 18 x 22 mm. Left upper pulmonary vein: 16 x 18 mm. Left lower pulmonary vein: 19 x 12 mm. 2. Normal pericardium with mild pericardial inflammation seen on postcontrast imaging. There is no significant pericardial effusion. 3. Small bilateral pleural effusions. 4. Normal left ventricular cavity size with severe left ventricular systolic dysfunction. LVEF 25%.  5. Normal right ventricular size with moderate right ventricular systolic dysfunction. RVEF 35%. Cta Chest Abd Pelv W Cont    Result Date: 11/25/2018  INDICATION:  Chest pain  CP EXAM: CT Angio Chest: TECHNIQUE: Unenhanced localizing CT imaging of the aorta is followed by bolus injection of 100 mL Isovue 370 contrast IV, with thin section axial Chest Abdomen Pelvis CT obtained and 3D image post processing performed including coronal MIPS. CT dose reduction was achieved through use of a standardized protocol tailored for this examination and automatic exposure control for dose modulation. FINDINGS: Abdominal aorta is atherosclerotic without dissection, aneurysm or stenosis. Iliofemoral arteries show no acute finding. Brachiocephalic artery origins are normal. Celiac artery, SMA, RYANN and bilateral solitary renal arteries are patent and nonstenotic. Lungs are clear. There are trace pleural effusions. There is no pneumothorax. Abdominal organs are unremarkable during arterial phase imaging. Kidneys show no mass, stone or hydronephrosis. There is no ascites, pneumoperitoneum or significant adenopathy. There are colonic diverticula without inflammation. Bladder is unremarkable. Appendix is normal.     IMPRESSION: 1. No aortic aneurysm or dissection. 2. Small pleural effusions. 3. No acute finding in the abdomen/pelvis.    ---------------------------------    Discharge Medications:      Discharge Medication List as of 12/3/2018  8:42 AM      START taking these medications    Details   colchicine (MITIGARE) 0.6 mg capsule Take 1 Cap by mouth daily. , No Print, Disp-30 Cap, R-0      acetaminophen (TYLENOL) 500 mg tablet Take 1 Tab by mouth every four (4) hours as needed., No Print, Disp-30 Tab, R-0      !! amiodarone (PACERONE) 400 mg tablet Take 1 Tab by mouth three (3) times daily. , Normal, Disp-4 Tab, R-0      !! amiodarone (PACERONE) 400 mg tablet Take 1 Tab by mouth daily. , Normal, Disp-30 Tab, R-0      apixaban (ELIQUIS) 5 mg tablet Take 1 Tab by mouth two (2) times a day., Normal, Disp-30 Tab, R-0      digoxin (LANOXIN) 0.125 mg tablet Take 1 Tab by mouth daily. , Normal, Disp-30 Tab, R-0      dilTIAZem (CARDIZEM) infusion 0-15 mg/hr by IntraVENous route TITRATE., No Print, Disp-1 Each, R-0      metoprolol succinate (TOPROL-XL) 50 mg XL tablet Take 1 Tab by mouth daily. , Normal, Disp-30 Tab, R-0      prochlorperazine 5 mg/mL soln 5 mg 5 mg by IntraVENous route every six (6) hours as needed for Nausea or Vomiting for up to 1 day., No Print, Disp-30 mL, R-0       !! - Potential duplicate medications found. Please discuss with provider. CONTINUE these medications which have NOT CHANGED    Details   tamsulosin (FLOMAX) 0.4 mg capsule Take 0.4 mg by mouth daily as needed (trouble urinating). , Historical Med      ibuprofen (MOTRIN) 200 mg tablet Take 200 mg by mouth every eight (8) hours as needed for Pain., Historical Med             Chronic Diagnoses:    Problem List as of 12/3/2018 Date Reviewed: 8/8/2018          Codes Class Noted - Resolved    Atrial fibrillation ([facility] ICD-10-CM: I48.91  ICD-9-CM: 427.31  12/3/2018 - Present        Abdominal fibromatosis ICD-10-CM: D48.1  ICD-9-CM: 238.1  12/3/2018 - Present        * (Principal) Atrial fibrillation with RVR ([facility] ICD-10-CM: I48.91  ICD-9-CM: 427.31  11/25/2018 - Present        Memory loss ICD-10-CM: R41.3  ICD-9-CM: 780.93  9/4/2014 - Present        Lumbar radiculitis ICD-10-CM: M54.16  ICD-9-CM: 724.4  7/18/2013 - Present    Overview Signed 7/18/2013  3:40 PM by Wan Martínez NP     Pt sees advanced ortho and receives epidural steroid injections.               Hypogonadism male ICD-10-CM: E29.1  ICD-9-CM: 257.2  4/14/2011 - Present        Hyperlipemia ICD-10-CM: E78.5  ICD-9-CM: 272.4  2/4/2011 - Present        Pre-diabetes ICD-10-CM: R73.03  ICD-9-CM: 790.29  1/6/2011 - Present        Sleep apnea ICD-10-CM: G47.30  ICD-9-CM: 780.57  12/21/2010 - Present        RAMIREZ (obstructive sleep apnea) ICD-10-CM: G47.33  ICD-9-CM: 327.23  5/17/2010 - Present    Overview Signed 5/17/2010 10:47 AM by Marquita nuñez             ED (erectile dysfunction) ICD-10-CM: N52.9  ICD-9-CM: 607.84  5/17/2010 - Present        Hypogonadism ICD-9-CM: Vita Comes  5/17/2010 - Present        Seasonal allergic reaction ICD-10-CM: J30.2  ICD-9-CM: 477.9  5/17/2010 - Present        Depression with anxiety ICD-10-CM: F41.8  ICD-9-CM: 300.4  5/17/2010 - Present        RESOLVED: BPH (benign prostatic hypertrophy) ICD-10-CM: N40.0  ICD-9-CM: 600.00  5/17/2010 - 12/21/2010              Signed:      Lucy Dunlap MD   Family Medicine Resident      12/3/2018   11:17 AM     Cc: Robert Oropeza DO     Encounter Diagnoses     ICD-10-CM ICD-9-CM   1. Atrial fibrillation with rapid ventricular response (HCC) I48.91 427.31   2.  Acute chest pain R07.9 786.50

## 2018-12-03 NOTE — CARDIO/PULMONARY
Cardiac Rehab: Living with Heart Failure Booklet given to Gladys Blum III at [facility]. Educated using teach back method. Discussed diagnosis definition and assessed patient understanding. Reviewed importance of daily weight monitoring and Low Sodium diet (5464-6591 mg. daily). Encouraged activity and rest periods within symptom limitations and as ordered by physician. Reviewed the importance of medication compliance and recognizing signs and symptoms of exacerbation, and when to report them to the doctor, to prevent re-hospitalization. Gladys Blum III was encouraged to keep all appointments with doctor. Smoking history assessed. Patient is a non smoker. Discussed the Cardiac Rehab Program, benefits, format, and encouraged enrollment, when eligible. Patient is interested and we will follow up, by phone, once eligible. Informed patient that he would have to wait 6 weeks from the date of discharge before he could begin the program.  
LILIYA Scott III could benefit from further education on the following HF topics: signs and symptoms.

## 2018-12-03 NOTE — PROGRESS NOTES
Samira Mahmood Jesus [address] Office [phone] Fax [phone] Assessment and Plan Anette Leon is a 70 y.o. male admitted for Atrial Fibrillation with RVR. He has spent 8 night(s) in the hospital. 
 
24 Hour Events: No acute events. Anticipate transfer to [address] [place] [address] today. Atrial fibrillation with RVR: Unclear etiology; possibly 2/2 untreated RAMIREZ. CTA shows small pleural effusions but negative for aortic dissection, PE, or pericardial effusion. TSH wnl and UDS neg. BNP 2639 and CK-MB wnl. S/p failed cardioversion and [place]. BL LE Doppler: negative. Echo with LVEF 20% 
- CHARLIE: Left atrial appendage: No mass was identified. 
- Continue Eliquis 5mg BID 
- Continue Metoprolol 50mg daily and Digoxin 0.125mg daily - Cardiac MRI (11/29) showed LVEF 25% and RVEF 35%, no significant pericardial effusion, mild pericardial inflammation - Cardiology following, appreciate recs: Colchicine for mild pericarditis, transfer to [facility] when bed available in preparation for AF/AFL ablation to be performed Tuesday afternoon. Continue Eliquis throughout uninterrupted. 
  
Acute HFrEF: Newly detected this admission. ProBNP: 2639 (elevated). Echo reveals LVEF 20% with mild LA and RA dilation.  
-  Monitor fluid status closely. Strict I/Os, daily weights.  
- Continue BB Pleuritic Chest Pain (resolved): Arrhythmia vs GERD. ACS less likely given neg trop throughout admission. Received Fentanyl x1 but now avoiding due to low BPs. Received Toradol x2 and GI cocktail.  
-Continue Tylenol as needed Acute hypoxic respiratory failure 2/2 pulmonary edema: Improving, weaned from bipap to high flow to now NC. D-dimer: 1.4 (H), initial CTA neg for PE and on heparin gtt. BLE duplex neg for DVT. ABG: pH 7.44, pCO2 32 pO2 84. CXR: pulmonary edema and small pleural effusions - Wean O2 as tolerated to keep sats > 90% - Will diurese prn 
 - Pulm following, appreciate recs: sleep eval as outpatient BPH 
-Will hold home flomax for now 
  
RAMIREZ- May have led to arrhythmia.  
-Not on CPAP at home, needs sleep eval on d/c 
  
FEN/GI - Cardiac diet Activity - Out of bed with assistance DVT prophylaxis - Eliquis GI prophylaxis - Not indicated at this time Fall prophylaxis - Not indicated at this time. Disposition - Plan to transfer to [facility] today for ablation scheduled for . Code Status - Full, discussed with patient  
  
Patient will be discussed with Dr. Mattie Boswell (Attending Physician) Caitie Moore MD 
Family Medicine Resident Subjective / Objective Subjective Patient resting comfortably. No acute concerns today. Denies chest pain, shortness of breath, N/V, abdominal pain. Temp (24hrs), Av.1 °F (36.7 °C), Min:97.6 °F (36.4 °C), Max:98.5 °F (36.9 °C) Objective: 
Vital signs: (most recent): Blood pressure 104/72, pulse 85, temperature 98.2 °F (36.8 °C), resp. rate 16, height 5' 11\" (1.803 m), weight 189 lb 9.5 oz (86 kg), SpO2 98 %. Respiratory: O2 Flow Rate (L/min): 2 l/min O2 Device: Room air Visit Vitals /72 (BP 1 Location: Left arm, BP Patient Position: At rest) Pulse 85 Temp 98.2 °F (36.8 °C) Resp 16 Ht 5' 11\" (1.803 m) Wt 189 lb 9.5 oz (86 kg) SpO2 98% BMI 26.44 kg/m² General: No acute distress. Alert. Cooperative. Head: Normocephalic. Atraumatic. Eyes:              Conjunctiva pink. Sclera white. Nose:             Mucosa pink. No drainage. Neck: Supple. Normal ROM. No stiffness. Respiratory: CTAB. Cardiovascular: Irregular rhythm. Normal S1,S2. No m/r/g. Pulses 2+ throughout. GI: Nontender. No rebound tenderness or guarding. Nondistended. Extremities: No LE edema. Distal pulses intact. Musculoskeletal: Full ROM in all extremities. Skin: Warm, dry. No rashes. I/O: 
Date 12/700 - 18 6505 18 - 18 2411 Shift 5068-9823 7536-8640 24 Hour Total 5910-1639 1069-5199 24 Hour Total  
INTAKE  
P.O. 1440  1440     
  P. O. 1440  1440 Shift Total(mL/kg) 1440(16.7)  1440(16.7) OUTPUT Urine(mL/kg/hr) 525(0.5) 925 1450 Urine Voided  Shift Total(mL/kg) 525(6.1) 925(10.8) 1450(16.9)  -925 -10 Weight (kg) 86 86 86 86 86 86 CBC: 
Recent Labs 12/03/18 
0150 12/02/18 
0459 12/01/18 
0140 WBC 5.9 5.1 5.2 HGB 14.1 13.7 13.4 HCT 44.3 43.0 42.4  222 202 Metabolic Panel: 
Recent Labs 12/03/18 
0150 12/02/18 
0459 12/01/18 
0140  139 139  
K 4.2 4.4 3.9  104 104 CO2 24 24 24 BUN 18 13 19 CREA 1.19 1.18 1.29  
GLU 96 105* 117* CA 9.0 9.2 8.7 MG 2.0 2.0 1.8 PHOS  --   --  3.7 For Billing Chief Complaint Patient presents with  Irregular Heart Beat Hospital Problems  Date Reviewed: 8/8/2018 Codes Class Noted POA * (Principal) Atrial fibrillation with RVR ([facility] ICD-10-CM: I48.91 
ICD-9-CM: 427.31  11/25/2018 Yes Hyperlipemia ICD-10-CM: E78.5 ICD-9-CM: 272.4  2/4/2011 Yes Pre-diabetes ICD-10-CM: R73.03 
ICD-9-CM: 790.29  1/6/2011 Yes RAMIREZ (obstructive sleep apnea) ICD-10-CM: G47.33 
ICD-9-CM: 327.23  5/17/2010 Yes Overview Signed 5/17/2010 10:47 AM by Kris Lujan  
  cpap Seasonal allergic reaction ICD-10-CM: J30.2 ICD-9-CM: 477.9  5/17/2010 Yes Depression with anxiety ICD-10-CM: F41.8 ICD-9-CM: 300.4  5/17/2010 Yes

## 2018-12-03 NOTE — PROGRESS NOTES
[address]  
                                                                            
TRANSFER SUMMARY Patient: Vincenzo Canales 
MRN: [id number] YOB: 1955 Age: 70 y.o. Date of admission:  11/25/2018 Date of discharge:  12/3/2018 Primary care provider:  Bud Major DO Admitting provider:  Fransico Ceballos MD 
 
Discharging provider(s): Edvin Gonzalez MD - Family Medicine Resident Dennise Grande MD -  Family Medicine Attending Consultations: 
· CARDIOLOGY: Darinel Patel · INTENSIVIST: Uriah Laboy Procedures: · TTE and CHARLIE Chief Complaint:  
· Chest pain Transfer destination: [facility].  The patient is stable for transfer. Admission diagnosis: 
Atrial fibrillation with RVR ([address] HPI:  
As per admitting provider: \"Tre Lieberman III is a 70 y.o. male with PMH of BPH, RAMIREZ non compliant with CPAP who presents to the ER complaining of chest pain. Patient states it began last night and continued into this morning, worsening around 4am, patient initially just thought it was heartburn. Patient then went to an urgent care center, where they stated he was showing a tachyarrhythmia on EKG, and he was sent to [address] [place] [address] ED. Patient denies smoking, drinks 1-2 beers a week, and denies illicit drug use. No Hx of thyroid issues. Does not use a CPAP for his sleep apnea. No other symptoms of lightheadedness, dizziness, numbness/tingling, or heart racing. Does complain of intermittent shortness of breath. Describes chest pain as sharp and mid sternal and it comes in waves, hurts to breathe in deep. \"  
 
 
Final discharge diagnoses and brief hospital course:  
 
Hospital Course Atrial fibrillation/ flutter with RVR: Atrial fibrillation with rapid ventricular response seen on EKG on admission with unclear etiology; possibly secondary to untreated RAMIREZ. Cardiology consulted.  CTA showed small pleural effusions but negative for aortic dissection, PE, or pericardial effusion. TSH wnl and UDS neg. BNP 2639. CK-MB wnl. CHARLIE showed left atrial appendage, no mass was identified. Patient failed chemical cardioversion and DCCV on three occassions. His rate was not controlled on Cardizem drip, Amiodorane and Digoxin. Patient started on Eliquis 5mg BID following Heparin drip. Per Dr. Angie Martin, patient to be transferred to [facility] for further cardiac work up for rate control and possible atrial ablation. Mild pericarditis: Found on Cardiac MRI. He was started on Colchicine 0.6mg daily. Acute HFrEF: Newly detected this admission. ProBNP: 2639 (elevated). Echo revealed LVEF 20% with mild LA and RA dilation. Monitor fluid status closely. Strict I/Os, daily weights. Continue Metropolol 50mg daily.  
  
Pleuritic Chest Pain (resolved): Patient presented with pleuritic chest pain likely secondary to arrhythmia vs GERD. Troponins were negative x3. Patient asymptomatic during hospital stay. Patient to receive Tylenol as needed. Acute hypoxic respiratory failure secondary to pulmonary edema: Patient developed shortness of breath on evening of 11/27 requiring BIPAP. CTA negative for PE and BLE duplex negative for a DVT. A chest xray showed pulmonary edema and small pleural effusions. Patient improved following diuresis and weaned off oxygen within the day. He continues to use High Flow at night and will require a sleep study follow up as outpatient for RAMIREZ.  
  
BPH: Hold home Flomax, resume on discharge.  
  
RAMIREZ: Patient not on CPAP at home, needs sleep evaluation as outpatient. Continue BIPAP or High Flow as tolerated at night. Follow-up Cares:  
· Pending LABS at the time of Discharge: none · Labs Need to Repeat by PCP: None Follow-up Information Follow up With Specialties Details Why Contact Info [facility]   CHF management visit [address] [place] [address] 
[phone] Pertinent Imaging Studies: Xr Chest [facility] Result Date: 11/26/2018 INDICATION:    dyspnea EXAMINATION:  AP CHEST, PORTABLE COMPARISON: 11/25/2018 FINDINGS: Single AP portable view of the chest at 2211 hours demonstrates unchanged cardiomediastinal silhouette. Lungs are adequately expanded with development of mild pulmonary edema and small pleural effusions. No pneumothorax. There is no new airspace disease. IMPRESSION: Development of pulmonary edema and small pleural effusions. Xr Chest [facility] Result Date: 11/25/2018 EXAM: Portable CXR. 1741 hours  INDICATION: dyspnea FINDINGS: The lungs are clear. Heart is normal in size. There is no overt pulmonary edema. There is no evident pneumothorax, adenopathy or pleural effusion. IMPRESSION: No Acute Disease. Mri Cardiac Morph Func W Wo Cont Result Date: 11/30/2018 CARDIOVASCULAR MRI INDICATION: PV mapping w gadolinium PROCEDURAL DATA: CPT Codes: 96298 SCANS PERFORMED: Spin Echo: Axial. FIESTA/SSFP Rest Perfusion LGE Contrast Agent: Magnevist.  Contrast Dose: 40ml. Impression: 1. All pulmonary veins were visualized draining into the left atrium. There is no sinus venosus defect. There is no persistent left SVC. There is no accessory pulmonary vein ostia. The esophagus courses behind the left atrium away from the pulmonary vein ostia. There is no left atrial appendage thrombus. The individual pulmonary vein ostial dimensions are as follows: Right upper pulmonary vein: 21 x 24 mm. Right lower pulmonary vein: 18 x 22 mm. Left upper pulmonary vein: 16 x 18 mm. Left lower pulmonary vein: 19 x 12 mm. 2. Normal pericardium with mild pericardial inflammation seen on postcontrast imaging. There is no significant pericardial effusion. 3. Small bilateral pleural effusions.  4. Normal left ventricular cavity size with severe left ventricular systolic dysfunction. LVEF 25%. 5. Normal right ventricular size with moderate right ventricular systolic dysfunction. RVEF 35%. Cta Chest Abd Pelv W Cont Result Date: 11/25/2018 INDICATION:  Chest pain  CP EXAM: CT Angio Chest: TECHNIQUE: Unenhanced localizing CT imaging of the aorta is followed by bolus injection of 100 mL Isovue 370 contrast IV, with thin section axial Chest Abdomen Pelvis CT obtained and 3D image post processing performed including coronal MIPS. CT dose reduction was achieved through use of a standardized protocol tailored for this examination and automatic exposure control for dose modulation. FINDINGS: Abdominal aorta is atherosclerotic without dissection, aneurysm or stenosis. Iliofemoral arteries show no acute finding. Brachiocephalic artery origins are normal. Celiac artery, SMA, RYANN and bilateral solitary renal arteries are patent and nonstenotic. Lungs are clear. There are trace pleural effusions. There is no pneumothorax. Abdominal organs are unremarkable during arterial phase imaging. Kidneys show no mass, stone or hydronephrosis. There is no ascites, pneumoperitoneum or significant adenopathy. There are colonic diverticula without inflammation. Bladder is unremarkable. Appendix is normal.  
 
IMPRESSION: 1. No aortic aneurysm or dissection. 2. Small pleural effusions. 3. No acute finding in the abdomen/pelvis. 
 
--------------------------------- Discharge Medications:   
 
Discharge Medication List as of 12/3/2018  8:42 AM  
  
START taking these medications Details  
colchicine (MITIGARE) 0.6 mg capsule Take 1 Cap by mouth daily. , No Print, Disp-30 Cap, R-0  
  
acetaminophen (TYLENOL) 500 mg tablet Take 1 Tab by mouth every four (4) hours as needed., No Print, Disp-30 Tab, R-0  
  
!! amiodarone (PACERONE) 400 mg tablet Take 1 Tab by mouth three (3) times daily. , Normal, Disp-4 Tab, R-0  
  
!! amiodarone (PACERONE) 400 mg tablet Take 1 Tab by mouth daily. , Normal, Disp-30 Tab, R-0  
  
apixaban (ELIQUIS) 5 mg tablet Take 1 Tab by mouth two (2) times a day., Normal, Disp-30 Tab, R-0  
  
digoxin (LANOXIN) 0.125 mg tablet Take 1 Tab by mouth daily. , Normal, Disp-30 Tab, R-0  
  
dilTIAZem (CARDIZEM) infusion 0-15 mg/hr by IntraVENous route TITRATE., No Print, Disp-1 Each, R-0  
  
metoprolol succinate (TOPROL-XL) 50 mg XL tablet Take 1 Tab by mouth daily. , Normal, Disp-30 Tab, R-0  
  
prochlorperazine 5 mg/mL soln 5 mg 5 mg by IntraVENous route every six (6) hours as needed for Nausea or Vomiting for up to 1 day., No Print, Disp-30 mL, R-0  
  
 !! - Potential duplicate medications found. Please discuss with provider. CONTINUE these medications which have NOT CHANGED Details  
tamsulosin (FLOMAX) 0.4 mg capsule Take 0.4 mg by mouth daily as needed (trouble urinating). , Historical Med  
  
ibuprofen (MOTRIN) 200 mg tablet Take 200 mg by mouth every eight (8) hours as needed for Pain., Historical Med Chronic Diagnoses:   
Problem List as of 12/3/2018 Date Reviewed: 8/8/2018 Codes Class Noted - Resolved Atrial fibrillation [facility]) ICD-10-CM: I48.91 
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 
ICD-9-CM: 427.31  11/25/2018 - Present Memory loss ICD-10-CM: R41.3 ICD-9-CM: 780.93  9/4/2014 - Present Lumbar radiculitis ICD-10-CM: M54.16 
ICD-9-CM: 724.4  7/18/2013 - Present Overview Signed 7/18/2013  3:40 PM by Kadeem Naranjo, MELINA Pt sees advanced ortho and receives epidural steroid injections. Hypogonadism male ICD-10-CM: E29.1 ICD-9-CM: 257.2  4/14/2011 - Present Hyperlipemia ICD-10-CM: E78.5 ICD-9-CM: 272.4  2/4/2011 - Present Pre-diabetes ICD-10-CM: R73.03 
ICD-9-CM: 790.29  1/6/2011 - Present  Sleep apnea ICD-10-CM: G47.30 
 ICD-9-CM: 780.57  12/21/2010 - Present RAMIREZ (obstructive sleep apnea) ICD-10-CM: V14.08 
ICD-9-CM: 327.23  5/17/2010 - Present Overview Signed 5/17/2010 10:47 AM by Matthew nuñez ED (erectile dysfunction) ICD-10-CM: N52.9 ICD-9-CM: 607.84  5/17/2010 - Present Hypogonadism ICD-9-CM: Marvin Alvarez  5/17/2010 - Present Seasonal allergic reaction ICD-10-CM: J30.2 ICD-9-CM: 477.9  5/17/2010 - Present Depression with anxiety ICD-10-CM: F41.8 ICD-9-CM: 300.4  5/17/2010 - Present RESOLVED: BPH (benign prostatic hypertrophy) ICD-10-CM: N40.0 ICD-9-CM: 600.00  5/17/2010 - 12/21/2010 Signed:  
 
 Caitie Moore MD 
 Family Medicine Resident 12/3/2018 11:17 AM 
  
Cc: Malinda Castañeda DO  
 
Encounter Diagnoses ICD-10-CM ICD-9-CM 1. Atrial fibrillation with rapid ventricular response (HCC) I48.91 427.31  
2.  Acute chest pain R07.9 786.50

## 2018-12-03 NOTE — PROGRESS NOTES
SHIFT CHANGE: 
1930 Bedside and Verbal shift change report given to Madie LOVELL (oncoming nurse) by Carlos Barnard (offgoing nurse). Report included the following information SBAR, Kardex, MAR and Recent Results. SHIFT SUMMARY: 
No new issues to report. Patient remains on dilt gtt 7.5 Received call from [employer] that patient was scheduled to be picked up at 0800. Report called to Maxwell Clay 0730. Nette Colorado NP on hand to prepare emtala. TRANSFER - OUT REPORT: 
 
Verbal report given to Maxwell Clay (name) on Ladona Anchors III  being transferred to 4th floor (450)(unit) for ordered procedure Report consisted of patients Situation, Background, Assessment and  
Recommendations(SBAR). Information from the following report(s) SBAR, Kardex, [facility] and Recent Results was reviewed with the receiving nurse. Lines:  
Peripheral IV 11/25/18 Left Forearm (Active) Site Assessment Clean, dry, & intact 12/3/2018  7:37 AM  
Phlebitis Assessment 0 12/3/2018  7:37 AM  
Infiltration Assessment 0 12/3/2018  7:37 AM  
Dressing Status Clean, dry, & intact 12/3/2018  7:37 AM  
Dressing Type Transparent 12/3/2018  7:37 AM  
Hub Color/Line Status Capped 12/3/2018  7:37 AM  
Action Taken Open ports on tubing capped 12/2/2018  4:51 AM  
Alcohol Cap Used Yes 12/3/2018  7:37 AM  
   
Peripheral IV 11/28/18 Right;Upper Arm (Active) Site Assessment Clean, dry, & intact 12/3/2018  7:37 AM  
Phlebitis Assessment 0 12/3/2018  7:37 AM  
Infiltration Assessment 0 12/3/2018  7:37 AM  
Dressing Status Clean, dry, & intact 12/3/2018  7:37 AM  
Dressing Type Transparent 12/3/2018  7:37 AM  
Hub Color/Line Status Infusing 12/3/2018  7:37 AM  
Action Taken Open ports on tubing capped 12/2/2018  4:51 AM  
Alcohol Cap Used Yes 12/3/2018  7:37 AM  
  
 
Opportunity for questions and clarification was provided. Patient transported with: 
 Monitor END OF SHIFT REPORT: 
 0730  Bedside and Verbal shift change report given to Meme Molina [facility] (oncoming nurse) by Parker Russo (offgoing nurse). Report included the following information SBAR, Kardex, MAR and Recent Results.

## 2018-12-03 NOTE — PROGRESS NOTES
CM noted previous CM notes. Patient is planned for an ablation tomorrow. Previous CM provided assistance with setting up [facility]; AVS updated. CM to continue to monitor and assess needs.   
 
Nneka Manzanares MS

## 2018-12-03 NOTE — PROGRESS NOTES
Notified patient has arrived to [facility]. N.p.o. after midnight. Continue Eliquis throughout. Plan for AF/AFL ablation tomorrow with me.  
 
Montse Martinez MD

## 2018-12-03 NOTE — DISCHARGE INSTRUCTIONS
TRANSFER OFF SERVICE INSTRUCTIONS    Mathilda Duverney / [id number] : 1955    Admission date: 2018 Discharge date: 12/3/2018       Primary care provider: Christiano Ureña DO    Discharging provider:  Adriano Carter MD  - Family Medicine Resident  Dr. Jasson Ag - Attending, Family Medicine    . . . . . . . . . . . . . . . . . . . . . . . . . . . . . . . . . . . . . . . . . . . . . . . . . . . . . . . . . . . . . . . . . . . . . . . Christy Rodriguez History of Present Illness  Mila Sotelo III is a 70 y.o. male with PMH of BPH, RAMIREZ non compliant with CPAP who presents to the ER complaining of chest pain. Patient states it began last night and continued into this morning, worsening around 4am, patient initially just thought it was heartburn. Patient then went to an urgent care center, where they stated he was showing a tachyarrhythmia on EKG, and he was sent to [address] [place] [address] ED. Patient denies smoking, drinks 1-2 beers a week, and denies illicit drug use. No Hx of thyroid issues. Does not use a CPAP for his sleep apnea. No other symptoms of lightheadedness, dizziness, numbness/tingling, or heart racing. Does complain of intermittent shortness of breath. Describes chest pain as sharp and mid sternal and it comes in waves, hurts to breathe in deep. Hospital Course   Atrial fibrillation with RVR: Atrial fibrillation with rapid ventricular response seen on EKG on admission with unclear etiology; possibly secondary to untreated RAMIREZ. Cardiology consulted. CTA showed small pleural effusions but negative for aortic dissection, PE, or pericardial effusion. TSH wnl and UDS neg. BNP 2639. CK-MB wnl. CHARLIE showed left atrial appendage, no mass was identified. Patient failed chemical cardioversion and DCCV on three occassions. His rate was not controlled on Cardizem drip, Amiodorane and Digoxin. Patient started on Eliquis following Heparin drip.  Per Dr. Julien Coreas, patient to be transferred to [facility] for further cardiac work up for rate control and possible atrial ablation. Mild pericarditis: Found on Cardiac MRI. He was started on Colchicine 0.6mg daily. Acute HFrEF: Newly detected this admission. ProBNP: 2639 (elevated). Echo revealed LVEF 20% with mild LA and RA dilation. Monitor fluid status closely. Strict I/Os, daily weights. Continue Metropolol 50mg daily.      Pleuritic Chest Pain (resolved): Patient presented with pleuritic chest pain likely secondary to arrhythmia vs GERD. Troponins were negative x3. Patient asymptomatic during hospital stay. Patient to receive Tylenol as needed. Acute hypoxic respiratory failure secondary to pulmonary edema: Patient developed shortness of breath on evening of 11/27 requiring BIPAP. CTA negative for PE and BLE duplex negative for a DVT. A chest xray showed pulmonary edema and small pleural effusions. Patient improved following diuresis and weaned off oxygen within the day. He continues to use High Flow at night and will require a sleep study follow up as outpatient for RAMIREZ.      BPH: Hold home Flomax, resume on discharge.      RAMIREZ: Patient not on CPAP at home, needs sleep evaluation as outpatient. Continue BIPAP or High Flow as tolerated at night. FOLLOW-UP CARE RECOMMENDATIONS:  Follow-up Information     Follow up With Specialties Details Why Contact Info    [address] [place] [address]   CHF management visit [place] [phone]        MEDICATION CHANGES:  START AMIODARONE 400MG DAILY  START COLCHICINE 0.6MG DAILY  CONTINUE ELIQUIS 5MG TWICE A DAY   CONTINUE DIGOXIN 0.125MG DAILY   CONTINUE METOPROLOL SUCCINATE 50MG DAILY   CONTINUE CARDIZEM drip  HOLD HOME FLOMAX UNTIL DISCHARGE    PENDING TEST RESULTS:  At the time of discharge the following test results are still pending: None.      Specific symptoms to watch for: chest pain, shortness of breath, fever, chills, nausea, vomiting, diarrhea, change in mentation, falling, weakness, bleeding. DIET:  Cardiac Diet    ACTIVITY:  Activity as tolerated    WOUND CARE: n/a    EQUIPMENT needed:  None    INCIDENTAL FINDINGS:  None    GOALS OF CARE:  x  Eventual return to home/independent/assisted living     Long term SNF      Hospice     No rehospitalization     Patient condition at discharge:   Functional status    Poor      Deconditioned    x  Independent   Cognition  x  Lucid     Forgetful (some sensescence)     Dementia   Catheters/lines (plus indication)    Stone     PICC      PEG    x  None   Code status  x  Full code      DNR    . . . . . . . . . . . . . . . . . . . . . . . . . . . . . . . . . . . . . . . . . . . . . . . . . . . . . . . . . . . . . . . . . . . . . . . Angeles Sotelo CHRONIC MEDICAL CONDITIONS:  Problem List as of 12/3/2018 Date Reviewed: 8/8/2018          Codes Class Noted - Resolved    * (Principal) Atrial fibrillation with RVR ([address] ICD-10-CM: I48.91  ICD-9-CM: 427.31  11/25/2018 - Present        Memory loss ICD-10-CM: R41.3  ICD-9-CM: 780.93  9/4/2014 - Present        Lumbar radiculitis ICD-10-CM: M54.16  ICD-9-CM: 724.4  7/18/2013 - Present    Overview Signed 7/18/2013  3:40 PM by Kadeem Naranjo NP     Pt sees advanced ortho and receives epidural steroid injections.               Hypogonadism male ICD-10-CM: E29.1  ICD-9-CM: 257.2  4/14/2011 - Present        Hyperlipemia ICD-10-CM: E78.5  ICD-9-CM: 272.4  2/4/2011 - Present        Pre-diabetes ICD-10-CM: R73.03  ICD-9-CM: 790.29  1/6/2011 - Present        Sleep apnea ICD-10-CM: G47.30  ICD-9-CM: 780.57  12/21/2010 - Present        RAMIREZ (obstructive sleep apnea) ICD-10-CM: G47.33  ICD-9-CM: 327.23  5/17/2010 - Present    Overview Signed 5/17/2010 10:47 AM by Sergio ZUNIGA     cpap             ED (erectile dysfunction) ICD-10-CM: N52.9  ICD-9-CM: 607.84  5/17/2010 - Present        Hypogonadism ICD-9-CM: Carin Peña  5/17/2010 - Present        Seasonal allergic reaction ICD-10-CM: J30.2  ICD-9-CM: 477.9  5/17/2010 - Present        Depression with anxiety ICD-10-CM: F41.8  ICD-9-CM: 300.4  5/17/2010 - Present        RESOLVED: BPH (benign prostatic hypertrophy) ICD-10-CM: N40.0  ICD-9-CM: 600.00  5/17/2010 - 12/21/2010                  Radial Cardiac Catheterization/Angiography Discharge Instructions   It is normal to feel tired the first couple days. Take it easy and follow the physicians instructions. CHECK THE CATHETER INSERTION SITE DAILY:   Remove the wrist dressing 24 hours after the procedure. You may shower 24 hours after the procedure. Wash with soap and water and pat dry. Gentle cleaning of the site with soap and water is sufficient, cover with a dry clean dressing or bandage. Do not apply creams or powders to the area. No soaking the wrist for 3 days. Leave the puncture site open to air after 24 hours post-procedure. CALL THE PHYSICIANS:   If the site becomes red, swollen or feels warm to the touch   If there is bleeding or drainage or if there is unusual pain at the radial site. If there is any minor oozing, you may apply a band-aid and remove after 12 hours. If the bleeding continues, hold pressure with the middle finger against the puncture site and the thumb against the back of the wrist,call 911 to be transported to the hospital.   DO NOT DRIVE YOURSELF, [facility]. ACTIVITY:   For the first 24 hours do not manipulate the wrist.   No lifting, pushing or pulling over 3-5 pounds with the affected wrist for 7 daysand no straining the insertion site. Do not life grocery bags or the garbage can, do not run the vacuum  or  for 7 days. Start with short walks as in the hospital and gradually increase as tolerated each day. It is recommended to walk 30 minutes 5-7 days per week. Follow your physicians instructions on activity. Avoid walking outside in extremes of heat or cold. Walk inside when it is cold and windy or hot and humid.    Things to keep in mind:   No driving for at least 24 hours, or as designated by your physician. Limit the number of times you go up and down the stairs   Take rests and pace yourself with activity. Be careful and do not strain with bowel movements. MEDICATIONS:   Take all medications as prescribed   Call your physician if you have any questions   Keep an updated list of your medications with you at all times and give a list to your physician and pharmacist   SIGNS AND SYMPTOMS:   Be cautious of symptoms of angina or recurrent symptoms such as chest discomfort, unusual shortness of breath or fatigue. These could be symptoms of restenosis, a new blockage or a heart attack. If your symptoms are relieved with rest it is still recommended that you notify your physician of recurrent chest pain or discomfort. For CHEST PAIN or symptoms of angina not relieved with rest: If the discomfort is not relieved with rest, and you have been prescribed Nitroglycerin, take as directed (taken under the tongue, one at a time 5 minutes apart for a total of 3 doses). If the discomfort is not relieved after the 3rd nitroglycerin, call 911. If you have not been prescribed Nitroglycerin and your chest discomfort is not relieved with rest, call 911. AFTER CARE:   Follow up with your physician as instructed. Follow a heart healthy diet with proper portion control, daily stress management, daily exercise, blood pressure and cholesterol control , and smoking cessation. Atrial Fibrillation: Care Instructions  Your Care Instructions    Atrial fibrillation is an irregular and often fast heartbeat. Treating this condition is important for several reasons. It can cause blood clots, which can travel from your heart to your brain and cause a stroke. If you have a fast heartbeat, you may feel lightheaded, dizzy, and weak. An irregular heartbeat can also increase your risk for heart failure.   Atrial fibrillation is often the result of another heart condition, such as high blood pressure or coronary artery disease. Making changes to improve your heart condition will help you stay healthy and active. Follow-up care is a key part of your treatment and safety. Be sure to make and go to all appointments, and call your doctor if you are having problems. It's also a good idea to know your test results and keep a list of the medicines you take. How can you care for yourself at home? Medicines    · Take your medicines exactly as prescribed. Call your doctor if you think you are having a problem with your medicine. You will get more details on the specific medicines your doctor prescribes.     · If your doctor has given you a blood thinner to prevent a stroke, be sure you get instructions about how to take your medicine safely. Blood thinners can cause serious bleeding problems.     · Do not take any vitamins, over-the-counter drugs, or herbal products without talking to your doctor first.    Lifestyle changes    · Do not smoke. Smoking can increase your chance of a stroke and heart attack. If you need help quitting, talk to your doctor about stop-smoking programs and medicines. These can increase your chances of quitting for good.     · Eat a heart-healthy diet.     · Stay at a healthy weight. Lose weight if you need to.     · Limit alcohol to 2 drinks a day for men and 1 drink a day for women. Too much alcohol can cause health problems.     · Avoid colds and flu. Get a pneumococcal vaccine shot. If you have had one before, ask your doctor whether you need another dose. Get a flu shot every year. If you must be around people with colds or flu, wash your hands often. Activity    · If your doctor recommends it, get more exercise. Walking is a good choice. Bit by bit, increase the amount you walk every day. Try for at least 30 minutes on most days of the week. You also may want to swim, bike, or do other activities.  Your doctor may suggest that you join a cardiac rehabilitation program so that you can have help increasing your physical activity safely.     · Start light exercise if your doctor says it is okay. Even a small amount will help you get stronger, have more energy, and manage stress. Walking is an easy way to get exercise. Start out by walking a little more than you did in the hospital. Gradually increase the amount you walk.     · When you exercise, watch for signs that your heart is working too hard. You are pushing too hard if you cannot talk while you are exercising. If you become short of breath or dizzy or have chest pain, sit down and rest immediately.     · Check your pulse regularly. Place two fingers on the artery at the palm side of your wrist, in line with your thumb. If your heartbeat seems uneven or fast, talk to your doctor. When should you call for help? Call 911 anytime you think you may need emergency care. For example, call if:    · You have symptoms of a heart attack. These may include:  ? Chest pain or pressure, or a strange feeling in the chest.  ? Sweating. ? Shortness of breath. ? Nausea or vomiting. ? Pain, pressure, or a strange feeling in the back, neck, jaw, or upper belly or in one or both shoulders or arms. ? Lightheadedness or sudden weakness. ? A fast or irregular heartbeat. After you call 911, the  may tell you to chew 1 adult-strength or 2 to 4 low-dose aspirin. Wait for an ambulance. Do not try to drive yourself.     · You have symptoms of a stroke. These may include:  ? Sudden numbness, tingling, weakness, or loss of movement in your face, arm, or leg, especially on only one side of your body. ? Sudden vision changes. ? Sudden trouble speaking. ? Sudden confusion or trouble understanding simple statements. ? Sudden problems with walking or balance.   ? A sudden, severe headache that is different from past headaches.     · You passed out (lost consciousness).    Call your doctor now or seek immediate medical care if:    · You have new or increased shortness of breath.     · You feel dizzy or lightheaded, or you feel like you may faint.     · Your heart rate becomes irregular.     · You can feel your heart flutter in your chest or skip heartbeats. Tell your doctor if these symptoms are new or worse.    Watch closely for changes in your health, and be sure to contact your doctor if you have any problems. Where can you learn more? Go to http://james-dia.info/. Enter U020 in the search box to learn more about \"Atrial Fibrillation: Care Instructions. \"  Current as of: December 6, 2017  Content Version: 11.8  © 2743-5634 [employer]. Care instructions adapted under license by [employer] (which disclaims liability or warranty for this information). If you have questions about a medical condition or this instruction, always ask your healthcare professional. [address] any warranty or liability for your use of this information.

## 2018-12-04 ENCOUNTER — ANESTHESIA (OUTPATIENT)
Dept: NON INVASIVE DIAGNOSTICS | Age: 63
DRG: 273 | End: 2018-12-04
Payer: COMMERCIAL

## 2018-12-04 ENCOUNTER — ANESTHESIA EVENT (OUTPATIENT)
Dept: NON INVASIVE DIAGNOSTICS | Age: 63
DRG: 273 | End: 2018-12-04
Payer: COMMERCIAL

## 2018-12-04 LAB
ACT BLD: 169 SECS (ref 79–138)
ACT BLD: 230 SECS (ref 79–138)
ACT BLD: 246 SECS (ref 79–138)
ACT BLD: 263 SECS (ref 79–138)
ACT BLD: 290 SECS (ref 79–138)
ANION GAP SERPL CALC-SCNC: 8 MMOL/L (ref 5–15)
BUN SERPL-MCNC: 17 MG/DL (ref 6–20)
BUN/CREAT SERPL: 14 (ref 12–20)
CALCIUM SERPL-MCNC: 8.7 MG/DL (ref 8.5–10.1)
CHLORIDE SERPL-SCNC: 106 MMOL/L (ref 97–108)
CO2 SERPL-SCNC: 24 MMOL/L (ref 21–32)
CREAT SERPL-MCNC: 1.21 MG/DL (ref 0.7–1.3)
ERYTHROCYTE [DISTWIDTH] IN BLOOD BY AUTOMATED COUNT: 13.1 % (ref 11.5–14.5)
GLUCOSE SERPL-MCNC: 100 MG/DL (ref 65–100)
HCT VFR BLD AUTO: 45.9 % (ref 36.6–50.3)
HGB BLD-MCNC: 14.8 G/DL (ref 12.1–17)
MAGNESIUM SERPL-MCNC: 2.1 MG/DL (ref 1.6–2.4)
MCH RBC QN AUTO: 27 PG (ref 26–34)
MCHC RBC AUTO-ENTMCNC: 32.2 G/DL (ref 30–36.5)
MCV RBC AUTO: 83.8 FL (ref 80–99)
NRBC # BLD: 0 K/UL (ref 0–0.01)
NRBC BLD-RTO: 0 PER 100 WBC
PLATELET # BLD AUTO: 245 K/UL (ref 150–400)
PMV BLD AUTO: 10.2 FL (ref 8.9–12.9)
POTASSIUM SERPL-SCNC: 4.2 MMOL/L (ref 3.5–5.1)
RBC # BLD AUTO: 5.48 M/UL (ref 4.1–5.7)
SODIUM SERPL-SCNC: 138 MMOL/L (ref 136–145)
WBC # BLD AUTO: 4.7 K/UL (ref 4.1–11.1)

## 2018-12-04 PROCEDURE — 74011000250 HC RX REV CODE- 250

## 2018-12-04 PROCEDURE — 93662 INTRACARDIAC ECG (ICE): CPT

## 2018-12-04 PROCEDURE — 74011000250 HC RX REV CODE- 250: Performed by: HOSPITALIST

## 2018-12-04 PROCEDURE — 4A0234Z MEASUREMENT OF CARDIAC ELECTRICAL ACTIVITY, PERCUTANEOUS APPROACH: ICD-10-PCS | Performed by: INTERNAL MEDICINE

## 2018-12-04 PROCEDURE — 85347 COAGULATION TIME ACTIVATED: CPT

## 2018-12-04 PROCEDURE — C1894 INTRO/SHEATH, NON-LASER: HCPCS

## 2018-12-04 PROCEDURE — 74011250636 HC RX REV CODE- 250/636

## 2018-12-04 PROCEDURE — 77030034850

## 2018-12-04 PROCEDURE — 77030013797 HC KT TRNSDUC PRSSR EDWD -A

## 2018-12-04 PROCEDURE — 77030039046 HC PAD DEFIB RADIOTRNSPNT CNMD -B

## 2018-12-04 PROCEDURE — 77030035291 HC TBNG PMP SMARTABLATE J&J -B

## 2018-12-04 PROCEDURE — 77030008684 HC TU ET CUF COVD -B: Performed by: ANESTHESIOLOGY

## 2018-12-04 PROCEDURE — 80048 BASIC METABOLIC PNL TOTAL CA: CPT

## 2018-12-04 PROCEDURE — 77030027107 HC PTCH EXT REF CARTO3 J&J -F

## 2018-12-04 PROCEDURE — C1769 GUIDE WIRE: HCPCS

## 2018-12-04 PROCEDURE — 74011250636 HC RX REV CODE- 250/636: Performed by: INTERNAL MEDICINE

## 2018-12-04 PROCEDURE — 02K83ZZ MAP CONDUCTION MECHANISM, PERCUTANEOUS APPROACH: ICD-10-PCS | Performed by: INTERNAL MEDICINE

## 2018-12-04 PROCEDURE — 74011000258 HC RX REV CODE- 258: Performed by: HOSPITALIST

## 2018-12-04 PROCEDURE — 02583ZZ DESTRUCTION OF CONDUCTION MECHANISM, PERCUTANEOUS APPROACH: ICD-10-PCS | Performed by: INTERNAL MEDICINE

## 2018-12-04 PROCEDURE — 65660000000 HC RM CCU STEPDOWN

## 2018-12-04 PROCEDURE — 36415 COLL VENOUS BLD VENIPUNCTURE: CPT

## 2018-12-04 PROCEDURE — 74011250637 HC RX REV CODE- 250/637: Performed by: HOSPITALIST

## 2018-12-04 PROCEDURE — C1732 CATH, EP, DIAG/ABL, 3D/VECT: HCPCS

## 2018-12-04 PROCEDURE — C1893 INTRO/SHEATH, FIXED,NON-PEEL: HCPCS

## 2018-12-04 PROCEDURE — C1730 CATH, EP, 19 OR FEW ELECT: HCPCS

## 2018-12-04 PROCEDURE — 77030026438 HC STYL ET INTUB CARD -A: Performed by: ANESTHESIOLOGY

## 2018-12-04 PROCEDURE — 85027 COMPLETE CBC AUTOMATED: CPT

## 2018-12-04 PROCEDURE — 83735 ASSAY OF MAGNESIUM: CPT

## 2018-12-04 PROCEDURE — C1759 CATH, INTRA ECHOCARDIOGRAPHY: HCPCS

## 2018-12-04 PROCEDURE — 74011250637 HC RX REV CODE- 250/637: Performed by: INTERNAL MEDICINE

## 2018-12-04 PROCEDURE — 77030020506 HC NDL TRNSPTL NRG BAYL -F

## 2018-12-04 RX ORDER — FUROSEMIDE 10 MG/ML
20-40 INJECTION INTRAMUSCULAR; INTRAVENOUS ONCE
Status: DISCONTINUED | OUTPATIENT
Start: 2018-12-04 | End: 2018-12-04

## 2018-12-04 RX ORDER — AMIODARONE HYDROCHLORIDE 200 MG/1
200 TABLET ORAL DAILY
Status: DISCONTINUED | OUTPATIENT
Start: 2018-12-05 | End: 2018-12-05 | Stop reason: HOSPADM

## 2018-12-04 RX ORDER — DEXAMETHASONE SODIUM PHOSPHATE 4 MG/ML
INJECTION, SOLUTION INTRA-ARTICULAR; INTRALESIONAL; INTRAMUSCULAR; INTRAVENOUS; SOFT TISSUE AS NEEDED
Status: DISCONTINUED | OUTPATIENT
Start: 2018-12-04 | End: 2018-12-04 | Stop reason: HOSPADM

## 2018-12-04 RX ORDER — GLYCOPYRROLATE 0.2 MG/ML
INJECTION INTRAMUSCULAR; INTRAVENOUS AS NEEDED
Status: DISCONTINUED | OUTPATIENT
Start: 2018-12-04 | End: 2018-12-04 | Stop reason: HOSPADM

## 2018-12-04 RX ORDER — PROTAMINE SULFATE 10 MG/ML
INJECTION, SOLUTION INTRAVENOUS AS NEEDED
Status: DISCONTINUED | OUTPATIENT
Start: 2018-12-04 | End: 2018-12-04 | Stop reason: HOSPADM

## 2018-12-04 RX ORDER — ATROPINE SULFATE 0.1 MG/ML
0.5 INJECTION INTRAVENOUS
Status: DISCONTINUED | OUTPATIENT
Start: 2018-12-04 | End: 2018-12-04

## 2018-12-04 RX ORDER — HYDROCODONE BITARTRATE AND ACETAMINOPHEN 5; 325 MG/1; MG/1
1 TABLET ORAL
Status: DISCONTINUED | OUTPATIENT
Start: 2018-12-04 | End: 2018-12-05 | Stop reason: HOSPADM

## 2018-12-04 RX ORDER — FUROSEMIDE 10 MG/ML
INJECTION INTRAMUSCULAR; INTRAVENOUS AS NEEDED
Status: DISCONTINUED | OUTPATIENT
Start: 2018-12-04 | End: 2018-12-04 | Stop reason: HOSPADM

## 2018-12-04 RX ORDER — SUCCINYLCHOLINE CHLORIDE 20 MG/ML
INJECTION INTRAMUSCULAR; INTRAVENOUS AS NEEDED
Status: DISCONTINUED | OUTPATIENT
Start: 2018-12-04 | End: 2018-12-04 | Stop reason: HOSPADM

## 2018-12-04 RX ORDER — HEPARIN SODIUM 1000 [USP'U]/ML
INJECTION, SOLUTION INTRAVENOUS; SUBCUTANEOUS AS NEEDED
Status: DISCONTINUED | OUTPATIENT
Start: 2018-12-04 | End: 2018-12-04 | Stop reason: HOSPADM

## 2018-12-04 RX ORDER — SODIUM CHLORIDE 9 MG/ML
INJECTION, SOLUTION INTRAVENOUS
Status: DISCONTINUED | OUTPATIENT
Start: 2018-12-04 | End: 2018-12-04 | Stop reason: HOSPADM

## 2018-12-04 RX ORDER — LIDOCAINE HYDROCHLORIDE 20 MG/ML
INJECTION, SOLUTION EPIDURAL; INFILTRATION; INTRACAUDAL; PERINEURAL AS NEEDED
Status: DISCONTINUED | OUTPATIENT
Start: 2018-12-04 | End: 2018-12-04 | Stop reason: HOSPADM

## 2018-12-04 RX ORDER — HEPARIN SODIUM 200 [USP'U]/100ML
500 INJECTION, SOLUTION INTRAVENOUS ONCE
Status: COMPLETED | OUTPATIENT
Start: 2018-12-04 | End: 2018-12-04

## 2018-12-04 RX ORDER — ROCURONIUM BROMIDE 10 MG/ML
INJECTION, SOLUTION INTRAVENOUS AS NEEDED
Status: DISCONTINUED | OUTPATIENT
Start: 2018-12-04 | End: 2018-12-04 | Stop reason: HOSPADM

## 2018-12-04 RX ORDER — MIDAZOLAM HYDROCHLORIDE 1 MG/ML
INJECTION, SOLUTION INTRAMUSCULAR; INTRAVENOUS
Status: COMPLETED
Start: 2018-12-04 | End: 2018-12-04

## 2018-12-04 RX ORDER — MIDAZOLAM HYDROCHLORIDE 1 MG/ML
INJECTION, SOLUTION INTRAMUSCULAR; INTRAVENOUS AS NEEDED
Status: DISCONTINUED | OUTPATIENT
Start: 2018-12-04 | End: 2018-12-04 | Stop reason: HOSPADM

## 2018-12-04 RX ORDER — ONDANSETRON 2 MG/ML
4 INJECTION INTRAMUSCULAR; INTRAVENOUS
Status: DISCONTINUED | OUTPATIENT
Start: 2018-12-04 | End: 2018-12-05 | Stop reason: HOSPADM

## 2018-12-04 RX ORDER — PHENYLEPHRINE HCL IN 0.9% NACL 0.4MG/10ML
SYRINGE (ML) INTRAVENOUS AS NEEDED
Status: DISCONTINUED | OUTPATIENT
Start: 2018-12-04 | End: 2018-12-04 | Stop reason: HOSPADM

## 2018-12-04 RX ORDER — ADENOSINE 3 MG/ML
6-24 INJECTION, SOLUTION INTRAVENOUS
Status: DISCONTINUED | OUTPATIENT
Start: 2018-12-04 | End: 2018-12-04

## 2018-12-04 RX ORDER — NEOSTIGMINE METHYLSULFATE 1 MG/ML
INJECTION INTRAVENOUS AS NEEDED
Status: DISCONTINUED | OUTPATIENT
Start: 2018-12-04 | End: 2018-12-04 | Stop reason: HOSPADM

## 2018-12-04 RX ORDER — SODIUM CHLORIDE, SODIUM LACTATE, POTASSIUM CHLORIDE, CALCIUM CHLORIDE 600; 310; 30; 20 MG/100ML; MG/100ML; MG/100ML; MG/100ML
INJECTION, SOLUTION INTRAVENOUS
Status: DISCONTINUED | OUTPATIENT
Start: 2018-12-04 | End: 2018-12-04 | Stop reason: HOSPADM

## 2018-12-04 RX ORDER — PANTOPRAZOLE SODIUM 40 MG/1
40 TABLET, DELAYED RELEASE ORAL
Status: DISCONTINUED | OUTPATIENT
Start: 2018-12-04 | End: 2018-12-05 | Stop reason: HOSPADM

## 2018-12-04 RX ORDER — ONDANSETRON 2 MG/ML
INJECTION INTRAMUSCULAR; INTRAVENOUS AS NEEDED
Status: DISCONTINUED | OUTPATIENT
Start: 2018-12-04 | End: 2018-12-04 | Stop reason: HOSPADM

## 2018-12-04 RX ORDER — ACETAMINOPHEN 325 MG/1
650 TABLET ORAL
Status: DISCONTINUED | OUTPATIENT
Start: 2018-12-04 | End: 2018-12-05 | Stop reason: HOSPADM

## 2018-12-04 RX ORDER — PROPOFOL 10 MG/ML
INJECTION, EMULSION INTRAVENOUS AS NEEDED
Status: DISCONTINUED | OUTPATIENT
Start: 2018-12-04 | End: 2018-12-04 | Stop reason: HOSPADM

## 2018-12-04 RX ORDER — SODIUM CHLORIDE 0.9 % (FLUSH) 0.9 %
5-10 SYRINGE (ML) INJECTION AS NEEDED
Status: DISCONTINUED | OUTPATIENT
Start: 2018-12-04 | End: 2018-12-05 | Stop reason: HOSPADM

## 2018-12-04 RX ORDER — HEPARIN SODIUM 1000 [USP'U]/ML
1000-10000 INJECTION, SOLUTION INTRAVENOUS; SUBCUTANEOUS
Status: DISCONTINUED | OUTPATIENT
Start: 2018-12-04 | End: 2018-12-04

## 2018-12-04 RX ORDER — LISINOPRIL 5 MG/1
5 TABLET ORAL DAILY
Status: DISCONTINUED | OUTPATIENT
Start: 2018-12-04 | End: 2018-12-05 | Stop reason: HOSPADM

## 2018-12-04 RX ORDER — SODIUM CHLORIDE 0.9 % (FLUSH) 0.9 %
5-10 SYRINGE (ML) INJECTION EVERY 8 HOURS
Status: DISCONTINUED | OUTPATIENT
Start: 2018-12-04 | End: 2018-12-04

## 2018-12-04 RX ADMIN — APIXABAN 5 MG: 5 TABLET, FILM COATED ORAL at 23:19

## 2018-12-04 RX ADMIN — Medication 40 MCG: at 14:49

## 2018-12-04 RX ADMIN — Medication 10 ML: at 19:14

## 2018-12-04 RX ADMIN — DIGOXIN 0.12 MG: 125 TABLET ORAL at 08:11

## 2018-12-04 RX ADMIN — Medication 80 MCG: at 15:19

## 2018-12-04 RX ADMIN — Medication 40 MCG: at 15:09

## 2018-12-04 RX ADMIN — HEPARIN SODIUM 700 UNITS: 1000 INJECTION INTRAVENOUS; SUBCUTANEOUS at 17:04

## 2018-12-04 RX ADMIN — APIXABAN 5 MG: 5 TABLET, FILM COATED ORAL at 08:11

## 2018-12-04 RX ADMIN — Medication 40 MCG: at 15:03

## 2018-12-04 RX ADMIN — GLYCOPYRROLATE 0.4 MG: 0.2 INJECTION INTRAMUSCULAR; INTRAVENOUS at 17:28

## 2018-12-04 RX ADMIN — Medication 80 MCG: at 15:43

## 2018-12-04 RX ADMIN — PROPOFOL 150 MG: 10 INJECTION, EMULSION INTRAVENOUS at 14:39

## 2018-12-04 RX ADMIN — HEPARIN SODIUM 4000 UNITS: 1000 INJECTION, SOLUTION INTRAVENOUS; SUBCUTANEOUS at 15:41

## 2018-12-04 RX ADMIN — ONDANSETRON 4 MG: 2 INJECTION INTRAMUSCULAR; INTRAVENOUS at 17:04

## 2018-12-04 RX ADMIN — Medication 40 MCG: at 14:40

## 2018-12-04 RX ADMIN — SUCCINYLCHOLINE CHLORIDE 140 MG: 20 INJECTION INTRAMUSCULAR; INTRAVENOUS at 14:40

## 2018-12-04 RX ADMIN — Medication 40 MCG: at 15:16

## 2018-12-04 RX ADMIN — Medication 40 MCG: at 15:12

## 2018-12-04 RX ADMIN — HEPARIN SODIUM 4000 UNITS: 1000 INJECTION, SOLUTION INTRAVENOUS; SUBCUTANEOUS at 16:32

## 2018-12-04 RX ADMIN — HEPARIN SODIUM 3000 UNITS: 1000 INJECTION, SOLUTION INTRAVENOUS; SUBCUTANEOUS at 16:05

## 2018-12-04 RX ADMIN — SODIUM CHLORIDE, SODIUM LACTATE, POTASSIUM CHLORIDE, CALCIUM CHLORIDE: 600; 310; 30; 20 INJECTION, SOLUTION INTRAVENOUS at 16:33

## 2018-12-04 RX ADMIN — DILTIAZEM HYDROCHLORIDE 5 MG/HR: 5 INJECTION, SOLUTION INTRAVENOUS at 07:44

## 2018-12-04 RX ADMIN — DEXAMETHASONE SODIUM PHOSPHATE 4 MG: 4 INJECTION, SOLUTION INTRA-ARTICULAR; INTRALESIONAL; INTRAMUSCULAR; INTRAVENOUS; SOFT TISSUE at 15:40

## 2018-12-04 RX ADMIN — COLCHICINE 0.6 MG: 0.6 TABLET, FILM COATED ORAL at 08:11

## 2018-12-04 RX ADMIN — SODIUM CHLORIDE: 9 INJECTION, SOLUTION INTRAVENOUS at 15:25

## 2018-12-04 RX ADMIN — Medication 80 MCG: at 15:50

## 2018-12-04 RX ADMIN — ROCURONIUM BROMIDE 10 MG: 10 INJECTION, SOLUTION INTRAVENOUS at 14:39

## 2018-12-04 RX ADMIN — Medication 120 MCG: at 15:23

## 2018-12-04 RX ADMIN — HYDROCODONE BITARTRATE AND ACETAMINOPHEN 1 TABLET: 5; 325 TABLET ORAL at 19:19

## 2018-12-04 RX ADMIN — AMIODARONE HYDROCHLORIDE 400 MG: 200 TABLET ORAL at 08:11

## 2018-12-04 RX ADMIN — Medication 10 ML: at 07:45

## 2018-12-04 RX ADMIN — Medication 40 MCG: at 14:48

## 2018-12-04 RX ADMIN — TAMSULOSIN HYDROCHLORIDE 0.4 MG: 0.4 CAPSULE ORAL at 08:11

## 2018-12-04 RX ADMIN — PROTAMINE SULFATE 40 MG: 10 INJECTION, SOLUTION INTRAVENOUS at 17:04

## 2018-12-04 RX ADMIN — SODIUM CHLORIDE: 9 INJECTION, SOLUTION INTRAVENOUS at 14:33

## 2018-12-04 RX ADMIN — Medication 10 ML: at 23:20

## 2018-12-04 RX ADMIN — ROCURONIUM BROMIDE 20 MG: 10 INJECTION, SOLUTION INTRAVENOUS at 16:15

## 2018-12-04 RX ADMIN — SODIUM CHLORIDE: 9 INJECTION, SOLUTION INTRAVENOUS at 14:28

## 2018-12-04 RX ADMIN — LISINOPRIL 5 MG: 5 TABLET ORAL at 19:18

## 2018-12-04 RX ADMIN — METOPROLOL SUCCINATE 50 MG: 50 TABLET, EXTENDED RELEASE ORAL at 08:11

## 2018-12-04 RX ADMIN — LIDOCAINE HYDROCHLORIDE 100 MG: 20 INJECTION, SOLUTION EPIDURAL; INFILTRATION; INTRACAUDAL; PERINEURAL at 14:39

## 2018-12-04 RX ADMIN — HEPARIN SODIUM IN SODIUM CHLORIDE 1000 UNITS: 200 INJECTION INTRAVENOUS at 15:07

## 2018-12-04 RX ADMIN — NEOSTIGMINE METHYLSULFATE 3 MG: 1 INJECTION INTRAVENOUS at 17:28

## 2018-12-04 RX ADMIN — Medication 120 MCG: at 16:25

## 2018-12-04 RX ADMIN — ALPRAZOLAM 0.25 MG: 0.25 TABLET ORAL at 10:05

## 2018-12-04 RX ADMIN — HEPARIN SODIUM IN SODIUM CHLORIDE 1000 UNITS: 200 INJECTION INTRAVENOUS at 15:05

## 2018-12-04 RX ADMIN — MIDAZOLAM HYDROCHLORIDE 2 MG: 1 INJECTION, SOLUTION INTRAMUSCULAR; INTRAVENOUS at 14:25

## 2018-12-04 RX ADMIN — HEPARIN SODIUM 8000 UNITS: 1000 INJECTION, SOLUTION INTRAVENOUS; SUBCUTANEOUS at 15:14

## 2018-12-04 RX ADMIN — FUROSEMIDE 40 MG: 10 INJECTION INTRAMUSCULAR; INTRAVENOUS at 17:09

## 2018-12-04 NOTE — PROGRESS NOTES
Spiritual Care Partner Volunteer visited patient in room 450 on 12.04.18. Documented by: : Rev. Mikaela Alvares. Fay Sinclair; [facility], to contact Allyn Corona

## 2018-12-04 NOTE — PROGRESS NOTES
Problem: Falls - Risk of 
Goal: *Absence of Falls Document Kathryn Marshd Fall Risk and appropriate interventions in the flowsheet. Outcome: Progressing Towards Goal 
Fall Risk Interventions: 
  
 
  
 
Medication Interventions: Evaluate medications/consider consulting pharmacy, Teach patient to arise slowly History of Falls Interventions: Consult care management for discharge planning, Evaluate medications/consider consulting pharmacy

## 2018-12-04 NOTE — PROGRESS NOTES
TRANSFER - OUT REPORT: 
 
Verbal report given to Anjel colunga RN(name) on Munira Mejía III  being transferred to RR(unit) for routine post - op Report consisted of patients Situation, Background, Assessment and  
Recommendations(SBAR). Information from the following report(s) SBAR, Procedure Summary, MAR and Recent Results was reviewed with the receiving nurse. Lines:  
Peripheral IV 11/25/18 Left Forearm (Active) Site Assessment Drainage (comment) 12/4/2018  1:45 PM  
Phlebitis Assessment 0 12/4/2018  1:45 PM  
Infiltration Assessment 0 12/4/2018  1:45 PM  
Dressing Status Old drainage 12/4/2018  1:45 PM  
Dressing Type Transparent 12/4/2018  1:45 PM  
Hub Color/Line Status Pink;Capped;Flushed 12/4/2018  1:45 PM  
Action Taken Dressing changed 12/4/2018  1:45 PM  
Alcohol Cap Used Yes 12/4/2018  1:45 PM  
   
Peripheral IV 11/28/18 Right;Upper Arm (Active) Site Assessment Clean, dry, & intact 12/4/2018  1:45 PM  
Phlebitis Assessment 0 12/4/2018  1:45 PM  
Infiltration Assessment 0 12/4/2018  1:45 PM  
Dressing Status Clean, dry, & intact 12/4/2018  1:45 PM  
Dressing Type Transparent 12/4/2018  1:45 PM  
Hub Color/Line Status Pink; Infusing 12/4/2018  1:45 PM  
Action Taken Open ports on tubing capped 12/4/2018 11:47 AM  
Alcohol Cap Used Yes 12/4/2018  1:45 PM  
   
Arterial Line 12/04/18 Right Radial artery (Active) Opportunity for questions and clarification was provided. Patient transported with: 
 Registered Nurse

## 2018-12-04 NOTE — PROGRESS NOTES
TRANSFER - IN REPORT: 
 
Verbal report received from [facility] and anesthesia on Jose R Kwong III  being received from procedure for routine progression of care. Report consisted of patients Situation, Background, Assessment and Recommendations(SBAR). Information from the following report(s) Procedure Summary, Intake/Output, MAR, Recent Results, Med Rec Status and Cardiac Rhythm SR was reviewed with the receiving clinician. Opportunity for questions and clarification was provided. Assessment completed upon patients arrival to [address] [place] [address]. Cardiac Cath Lab Recovery Arrival Note: 
 
Jose R Kwong III arrived to [facility] recovery area. Patient procedure= EPS/ablation. Patient on cardiac monitor, non-invasive blood pressure, SPO2 monitor. On  O2 @ 2 lpm via n/c. IV  of nacl on pump at 25 ml/hr. Patient status doing well without problems. Patient is A&Ox 4. Patient reports no complaints. PROCEDURE SITE CHECK: 
 
Procedure site:without any bleeding and or hematoma, no pain/discomfort reported at procedure site. No change in patient status. Continue to monitor patient and status. Dr Mesfin Varela talked with pt and family

## 2018-12-04 NOTE — PROGRESS NOTES
Hospitalist Progress Note Helen Mcmahan MD 
     
                             Answering service: [phone] OR [phone] from in house phone Date of Service:  2018 NAME:  Rosy Burton III 
:  1955 MRN:  [id number] Admission Summary:  
Amber Longo is a 70 y.o. male who presents with A fib and tx from [facility] for ablation. reviewing chart and from pt unsuccessful [address] x 3 and Dr. Kylah Quesada wants him to be tx here at [facility] for further MGMT. Reason for follow up:  
Seen before he went down to ablation. He did not have any complaints,specifically denied chest pain/pressure,palpitation or shortness of breath. Assessment & Plan:  
#Atrial fibrillation/flutter with rapid ventricular response,refractory to ablation and medicaitons  
-On amiodarone,Eliquis. 
-Ablation today  
-CHARLIE showed no DEVENDRA thrombus. DCCV 18 with 360 J was not successful. # Acute on chronic CHF NYHA class III, new onset: EF 25% per echo. Non ischemic likely due to fast rate. Cardiac cath negative for CAD 
-cont BB, no ACE/ARB d/t hypotension #Sleep apnea: Currently does not use CPAP for many years. Diet:NPOP for procedure. Cardiac diet when  He returns from procedure. Code status: full DVT prophylaxis: Eliquis. Anticipate discharge home tomorrow. Hospital Problems  Date Reviewed: 2018 Codes Class Noted POA Atrial fibrillation [facility]) ICD-10-CM: I48.91 
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 
ICD-9-CM: 427.31  2018 Yes Review of Systems: A comprehensive review of systems was negative except for that written in the HPI. Physical Examination: Last 24hrs VS reviewed since prior progress note. Most recent are: 
Visit Vitals /79 (BP 1 Location: Left arm, BP Patient Position: Post activity) Pulse 84 Temp 97.8 °F (36.6 °C) Resp 20 Wt 86 kg (189 lb 9.5 oz) SpO2 99% BMI 26.44 kg/m² Constitutional:  No acute distress, cooperative, pleasant   
HEENT: Head is a traumatic, Un icteric sclera. Pink conjunctiva,no erythema or discharge. Oral mucous moist, oropharynx benign. Neck supple, Resp:  CTA bilaterally. No wheezing/rhonchi/rales. No accessory muscle use CV:  Regular rhythm, normal rate, no murmurs, gallops, rubs GI:  Soft, non distended, non tender. normoactive bowel sounds, no hepatosplenomegaly :  No CVA or suprapubic tenderness Skin  :  No erythema,rash,bullae,dipigmentation Musculoskeletal:  No edema, warm, 2+ pulses throughout Neurologic:  AAOx3, CN II-XII reviewed. Moves all extremities. Psych:  Good insight, Not anxious nor agitated. Intake/Output Summary (Last 24 hours) at 12/4/2018 1715 Last data filed at 12/4/2018 1710 Gross per 24 hour Intake 2348.47 ml Output 2150 ml Net 198.47 ml Data Review:  
 Review and/or order of clinical lab test 
Review and/or order of tests in the radiology section of CPT Review and/or order of tests in the medicine section of CPT Labs:  
 
Recent Labs 12/04/18 
2683 12/03/18 
0150 WBC 4.7 5.9 HGB 14.8 14.1 HCT 45.9 44.3  209 Recent Labs 12/04/18 
6586 12/03/18 
0150 12/02/18 
4327  138 139  
K 4.2 4.2 4.4  
 103 104 CO2 24 24 24 BUN 17 18 13 CREA 1.21 1.19 1.18  
 96 105* CA 8.7 9.0 9.2 MG 2.1 2.0 2.0 No results for input(s): SGOT, GPT, ALT, AP, TBIL, TBILI, TP, ALB, GLOB, GGT, AML, LPSE in the last 72 hours. No lab exists for component: AMYP, HLPSE Recent Labs 12/03/18 
0150 12/02/18 
0459 APTT 29.0 29.5 No results for input(s): FE, TIBC, PSAT, FERR in the last 72 hours. No results found for: FOL, RBCF No results for input(s): PH, PCO2, PO2 in the last 72 hours. No results for input(s): CPK, CKNDX, TROIQ in the last 72 hours. No lab exists for component: CPKMB Lab Results Component Value Date/Time Cholesterol, total 176 02/08/2018 08:20 AM  
 HDL Cholesterol 25 (L) 02/08/2018 08:20 AM  
 LDL, calculated 113 (H) 02/08/2018 08:20 AM  
 Triglyceride 192 (H) 02/08/2018 08:20 AM  
 
Lab Results Component Value Date/Time Glucose (POC) 122 (H) 11/26/2018 09:22 PM  
 
Lab Results Component Value Date/Time Color YELLOW/STRAW 11/25/2018 10:24 PM  
 Appearance CLEAR 11/25/2018 10:24 PM  
 Specific gravity <1.005 11/25/2018 10:24 PM  
 pH (UA) 5.5 11/25/2018 10:24 PM  
 Protein NEGATIVE  11/25/2018 10:24 PM  
 Glucose NEGATIVE  11/25/2018 10:24 PM  
 Ketone 15 (A) 11/25/2018 10:24 PM  
 Bilirubin NEGATIVE  11/25/2018 10:24 PM  
 Urobilinogen 0.2 11/25/2018 10:24 PM  
 Nitrites NEGATIVE  11/25/2018 10:24 PM  
 Leukocyte Esterase NEGATIVE  11/25/2018 10:24 PM  
 Epithelial cells FEW 11/25/2018 10:24 PM  
 Bacteria NEGATIVE  11/25/2018 10:24 PM  
 WBC 0-4 11/25/2018 10:24 PM  
 RBC 0-5 11/25/2018 10:24 PM  
 
 
 
Medications Reviewed:  
 
Current Facility-Administered Medications Medication Dose Route Frequency  isoproterenol (ISUPREL) 0.2 mg in 0.9% sodium chloride 50 mL infusion  0-10 mcg/min IntraVENous TITRATE  atropine injection 0.5 mg  0.5 mg IntraVENous Multiple  heparin (porcine) 1,000 unit/mL injection 1,000-10,000 Units  1,000-10,000 Units IntraVENous Multiple  adenosine (ADENOCARD) injection 6-24 mg  6-24 mg IntraVENous Multiple  furosemide (LASIX) injection 20-40 mg  20-40 mg IntraVENous ONCE  
 sodium chloride (NS) flush 5-10 mL  5-10 mL IntraVENous Q8H  
 sodium chloride (NS) flush 5-10 mL  5-10 mL IntraVENous PRN  
  acetaminophen (TYLENOL) tablet 650 mg  650 mg Oral Q4H PRN  
 HYDROcodone-acetaminophen (NORCO) 5-325 mg per tablet 1 Tab  1 Tab Oral Q4H PRN  
 ondansetron (ZOFRAN) injection 4 mg  4 mg IntraVENous Q4H PRN  pantoprazole (PROTONIX) tablet 40 mg  40 mg Oral ACB  [START ON 12/5/2018] amiodarone (CORDARONE) tablet 200 mg  200 mg Oral DAILY  acetaminophen (TYLENOL) tablet 650 mg  650 mg Oral Q4H PRN  
 sodium chloride (NS) flush 5-10 mL  5-10 mL IntraVENous Q8H  
 sodium chloride (NS) flush 5-10 mL  5-10 mL IntraVENous PRN  
 metoprolol succinate (TOPROL-XL) XL tablet 50 mg  50 mg Oral DAILY  apixaban (ELIQUIS) tablet 5 mg  5 mg Oral BID  colchicine tablet 0.6 mg  0.6 mg Oral DAILY  tamsulosin (FLOMAX) capsule 0.4 mg  0.4 mg Oral DAILY  prochlorperazine (COMPAZINE) with saline injection 5 mg  5 mg IntraVENous Q6H PRN  
 ALPRAZolam (XANAX) tablet 0.25 mg  0.25 mg Oral TID PRN Facility-Administered Medications Ordered in Other Encounters Medication Dose Route Frequency  midazolam (VERSED) injection   IntraVENous PRN  
 PHENYLephrine (CHAVA-SYNEPHRINE) 10 mg in 0.9% sodium chloride 250 mL infusion   IntraVENous CONTINUOUS  
 PHENYLephrine (NEOSYNEPHRINE) in NS syringe   IntraVENous PRN  propofol (DIPRIVAN) 10 mg/mL injection   IntraVENous PRN  
 rocuronium (ZEMURON) injection   IntraVENous PRN  
 succinylcholine (ANECTINE) injection   IntraVENous PRN  
 0.9% sodium chloride infusion   IntraVENous CONTINUOUS  
 0.9% sodium chloride infusion   IntraVENous CONTINUOUS  
 lidocaine (PF) (XYLOCAINE) 20 mg/mL (2 %) injection   IntraVENous PRN  
 heparin (porcine) 1,000 unit/mL injection   IntraVENous PRN  
 dexamethasone (DECADRON) 4 mg/mL injection    PRN  
 lactated Ringers infusion   IntraVENous CONTINUOUS  
 ondansetron (ZOFRAN) injection   IntraVENous PRN  protamine injection    PRN  
 furosemide (LASIX) injection    PRN  
 
 ______________________________________________________________________ EXPECTED LENGTH OF STAY: 1d 21h ACTUAL LENGTH OF STAY:          1 John Waggoner MD

## 2018-12-04 NOTE — PROGRESS NOTES
1530: Bedside shift change report given to Hue Parikh  (oncoming nurse) by AP RN (offgoing nurse). Report included the following information SBAR and Kardex. 1930: Bedside shift change report given to [id number] [facility] (oncoming nurse) by [employer] RN (offgoing nurse). Report included the following information SBAR and Kardex.

## 2018-12-04 NOTE — PROGRESS NOTES
Cardiac Cath Lab Procedure Area Arrival Note: 
 
Mila Sotelo III arrived to Cardiac Cath Lab, Procedure Area. Patient identifiers verified with NAME and DATE OF BIRTH. Procedure verified with patient. Consent forms verified. Allergies verified. Patient informed of procedure and plan of care. Questions answered with review. Patient voiced understanding of procedure and plan of care. Patient on cardiac monitor, non-invasive blood pressure, SPO2 monitor. On ra, anesthesia here for sedation and airway management. IV of ns on pump at 25 ml/hr. Patient status doing well without problems. Patient is A&Ox 3. Patient reports anxiety, no pain. Patient medicated during procedure with orders obtained and verified by Dr. Julien Rodriguez Refer to patients Cardiac Cath Lab PROCEDURE REPORT for vital signs, assessment, status, and response during procedure, printed at end of case. Printed report on chart or scanned into chart.

## 2018-12-04 NOTE — ANESTHESIA POSTPROCEDURE EVALUATION
Post-Anesthesia Evaluation and Assessment Patient: Lila Morgan MRN: [id number]  SSN: [id number] YOB: 1955  Age: 70 y.o. Sex: male I have evaluated the patient and they are stable and ready for discharge from the PACU. Cardiovascular Function/Vital Signs Visit Vitals /61 Pulse 61 Temp 36.6 °C (97.9 °F) Resp 11 Wt 86 kg (189 lb 9.5 oz) SpO2 97% BMI 26.44 kg/m² Patient is status post * No anesthesia type entered * anesthesia for * No procedures listed *. Nausea/Vomiting: None Postoperative hydration reviewed and adequate. Pain: 
Pain Scale 1: Numeric (0 - 10) (12/04/18 1345) Pain Intensity 1: 0 (12/04/18 1345) Managed Neurological Status:  
Neuro Neurologic State: Alert (12/04/18 9192) Orientation Level: Oriented X4 (12/04/18 9678) Cognition: Appropriate decision making; Appropriate for age attention/concentration; Appropriate safety awareness; Follows commands (12/04/18 1070) Speech: Clear (12/04/18 0960) LUE Motor Response: Purposeful;Spontaneous  (12/03/18 2030) LLE Motor Response: Purposeful;Spontaneous  (12/03/18 2030) RUE Motor Response: Purposeful;Spontaneous  (12/03/18 2030) RLE Motor Response: Purposeful;Spontaneous  (12/03/18 2030) At baseline Mental Status, Level of Consciousness: Alert and  oriented to person, place, and time Pulmonary Status:  
O2 Device: Nasal cannula (12/04/18 1744) Adequate oxygenation and airway patent Complications related to anesthesia: None Post-anesthesia assessment completed. No concerns Signed By: Julieta Cast MD   
 December 4, 2018 * No procedures listed *. 
 
<BSHSIANPOST> Visit Vitals /61 Pulse 61 Temp 36.6 °C (97.9 °F) Resp 11 Wt 86 kg (189 lb 9.5 oz) SpO2 97% BMI 26.44 kg/m²

## 2018-12-04 NOTE — PROCEDURES
Cardiac Electrophysiology Report      PATIENT INFORMATION        Patient Name: Luci Hair MRN: [id number]              Study Date: 2018    YOB: 1955   Age: 70 y.o. Gender: male      Procedure:  Atrial Fibrillation AblationRa    Referring Physician:  Mellisa Sigala DO and Dr. Gael Schirmer     Duty Name   Electrophysiologist Xiang Alaniz MD   Monitor Anesthesia   Circulator Katiuskamelida Pretty RN; Abdon Eckert RN; Destiny Landis RN       PATIENT HISTORY     Hernandez Acuña III is a 70 y.o. male hospitalized with chest pain noted to have AF/AFL with RVR that failed to respond to cardizem drip. He underwent CHARLIE/DCCV that failed to restore sinus rhythm; subsequently ibutilide failed as well. He continues to exhibit AF/AFL with RVR. Echocardiogram revealed an LV function of 35%; left heart catheterization demonstrated normal coronary arteries. He underwent amiodarone loading and repeat attempt at cardioversion however was noted to have early recurrence of AF. Cardiac MRI demonstrated mild pericarditis and colchicine was started. He continued to exhibit AF requiring diltiazem drip. He thus presents for an AF/AFL ablation. A previously performed transesophageal echocardiogram demonstrated the left atrium and left atrial appendage were free of visualized thrombus. PROCEDURE     TThe patient was brought to the Cardiac Electrophysiology laboratory in a post-absorptive, fasting state. Informed consent was obtained. A peripheral IV was in place. Continuous electrocardiographic, blood pressure, O2 saturation and  CO2 monitoring was initiated. Self-adhesive cardioversion patches were positioned on the chest. General anesthesia was effectuated by the anesthesia service. The patient was then prepped and draped in the usual sterile fashion.  A multi-planar lubricated CHARLIE probe was advanced to the oropharynx and into the esophagus without difficulty. Limited views were obtained visualizing the left atrium and left atrial appendage. The left atrial appendage was visualized in multiple views and demonstrated the LA appendage was free of visualized thrombus. The CHARLIE probe was then removed without difficulty. The emptying velocity of the DEVENDRA was   M/sec. Both groins were infiltrated with a 50/50 mixture of Lidocaine (1%) and bupivicaine (0.5%). Vascular access was obtained and a steerable sheath, SL0 sheath and an 8F sheath were placed in the right common femoral vein using the modified Seldinger technique. Through these sheaths, an 8F phased-array intracardiac ultrasound catheter was advanced to the right atrium where the surrounding structures were visualized and the fossa ovalis was localized. Through the sheaths, two 0.032, 145-cm Omada wires were advanced to the level of the superior vena cava. After the guidewire was withdrawn, a [employer] transseptal needle was introduced into the sheath. The needle/sheath assembly was withdrawn under fluoroscopic and ICE guidance until the tip of the sheath prolapsed into the fossa ovalis. Appropriate positioning was confirmed with multiple fluoroscopic views and ICE imaging. Transseptal access was obtained following delivery of RF energy with the [facility] needle. The dilator was advanced over the wire, followed by the sheath. The dilator and wire were removed. A circular duodecapolar Lasso mapping catheter was advanced into the left atrium. Systemic heparinization was initiated and the sheath was flushed continuously with heparinized saline. Anticoagulation status was monitored with frequent ACT measurements. Heparin was given in interrupted doses to maintain an ACT > 300 sec. Using the CARTO3 mapping system, detailed mapping was performed and a 3D electroanatomical, geometric rendering of the left atrium was created.   The Lasso catheter was then exchanged for a 3.5 mm, bi-directional, externally-irrigated contact-force sensing ablation catheter and further detailed mapping was completed. This electroanatomic map was then merged with a previously obtained cardiac MRI. Over the second 0.032  145-cm wire, a second transseptal access was obtained. The ICE catheter was then exchanged for a 7F octapolar electrode catheter which was utilized to catheterize the coronary sinus for left atrial activity recording. Esophageal temperatures were monitored using a temperature probe. The height of the esophageal temperature probe was adjusted throughout the procedure in relation to the position of the ablation catheter. The baseline ECG revealed sinus rhythm atrial flutter with  msec. Using a 3.5 mm externally irrigated contact force sensing ablation catheter, entrainment and activation mapping was performed which exhibited findings consistent with typical atrial flutter. Ablation across the CTI was performed and resulted in termination of the tachycardia to sinus rhythm. Shortly thereafter, AF spontaneously induced. Wide antral circumferential ablation around the left and right pulmonary veins was performed. Using the Lasso mapping catheter, sites of persistent conduction were sought and further ablation was performed when necessary. Pacing was then performed along the line of ablation around the pulmonary veins and further ablation was performed at sites where persistent atrial capture was demontrated until capture was no longer evident. Entrance and exit block across all four pulmonary veins was demonstrated. At the completion of the study, both transseptal sheaths were withdrawn to the RA. Catheters were placed in the RA and RV for recording and pacing as well as the His position for recording. A comprehensive electrophysiology study was performed. There was no pericardial effusion noted at the conclusion of the procedure.  Following a test dose, protamine 40 mg was administered and once the ACT was found to be < 180 seconds, all catheters and sheaths were removed and hemostasis obtained by direct manual compression. The patient was extubated, hemodynamically stable, tolerated the procedure well and was transferred in stable condition. There were no immediate complications encountered during the procedure. There was minimal blood loss and no specimen were removed. CONDUCTION INTERVALS      See attached report      FINDINGS     1. The baseline ECG revealed sinus rhythm atrial flutter with  msec  2. Using a 3.5 mm externally irrigated contact force sensing ablation catheter, entrainment and activation mapping was performed which exhibited findings consistent with typical atrial flutter. 3. Ablation across the CTI was performed and resulted in termination of the tachycardia to sinus rhythm. 4. Shortly thereafter, AF spontaneously induced. 5. All pulmonary veins were successfully mapped using 3D mapping and ICE imaging. 6. Wide antral circumferential ablation was performed sucessfully. 7. Entrance and exit block across all four pulmonary veins was demonstrated. 8. Bidirectional conduction block was demonstrated across the CTI that persisted following an observation period. RADIOLOGY SUMMARY       Total    Fluoro Time (minutes)  8.4    Dose Area Product (mGy)  279        CONCLUSIONS      1. Successful atrial fibrillation ablation. 2. Successful typical atrial flutter ablation performed  3. Monitor on telemetry overnight. 4. Continue anticoagulation with eliquis 5 mg bid, first dose tonight  5. GI acid suppression for prophylactic esophageal protection (Protonix 40 mg po daily x 1 month). 6. Resume all other home medications including amiodarone at 200 mg daily for duration of healing phase; discontinue diltiazem drip/digoxin. Continue oral metoprolol/colchicine  7. Evaluate for LV function recovery in 3 months  8. Follow up in 1 month in EP clinic with Dr. Bernard Pelaez   9.  Thirty day monitor as outpatient  10. Outpatient sleep study to restart CPAP as soon as possible  11. Lisinopril 5 mg orally daily for cardiomyopathy  12. Follow up with Christiano Ureña DO and Dr. Shaneka Lipscomb as scheduled. Thank you, Christiano Ureña DO and Dr. Shaneka Lipscomb for allowing me to participate in the care of this extraordinarily pleasant male.        Devi Cesar MD  Cardiac Electrophysiology / Cardiology    [address]  [address] [facility], [address], [address]  [place]                 Shree Dalal  [phone] / [phone] Fax   [phone] / [phone] Fax

## 2018-12-04 NOTE — PROGRESS NOTES
TRANSFER - OUT REPORT: 
 
Verbal report given to Aaliyah Aguila RN(name) on Danielle Fitzgerald III  being transferred to CVSU(unit) for routine progression of care Report consisted of patients Situation, Background, Assessment and  
Recommendations(SBAR). Information from the following report(s) Procedure Summary, Intake/Output, MAR, Recent Results, Med Rec Status and Cardiac Rhythm SR  60's was reviewed with the receiving nurse. Lines:  
Peripheral IV 11/25/18 Left Forearm (Active) Site Assessment Drainage (comment) 12/4/2018  1:45 PM  
Phlebitis Assessment 0 12/4/2018  1:45 PM  
Infiltration Assessment 0 12/4/2018  1:45 PM  
Dressing Status Old drainage 12/4/2018  1:45 PM  
Dressing Type Transparent 12/4/2018  1:45 PM  
Hub Color/Line Status Pink;Capped;Flushed 12/4/2018  1:45 PM  
Action Taken Dressing changed 12/4/2018  1:45 PM  
Alcohol Cap Used Yes 12/4/2018  1:45 PM  
   
Peripheral IV 11/28/18 Right;Upper Arm (Active) Site Assessment Clean, dry, & intact 12/4/2018  1:45 PM  
Phlebitis Assessment 0 12/4/2018  1:45 PM  
Infiltration Assessment 0 12/4/2018  1:45 PM  
Dressing Status Clean, dry, & intact 12/4/2018  1:45 PM  
Dressing Type Transparent 12/4/2018  1:45 PM  
Hub Color/Line Status Pink; Infusing 12/4/2018  1:45 PM  
Action Taken Open ports on tubing capped 12/4/2018 11:47 AM  
Alcohol Cap Used Yes 12/4/2018  1:45 PM  
   
Arterial Line 12/04/18 Right Radial artery (Active) Opportunity for questions and clarification was provided. Patient transported with: 
 Monitor Registered Nurse  
 
[phone] transferred via stretcher to room 450

## 2018-12-04 NOTE — PROGRESS NOTES
1930: Bedside and Verbal shift change report given to [facility], RN (oncoming nurse) by Zakiya Irizarry RN (offgoing nurse). Report included the following information SBAR, Kardex, ED Summary, Intake/Output, MAR, Recent Results and Cardiac Rhythm A.fib/A.flutter.

## 2018-12-04 NOTE — PROGRESS NOTES
1530 Bedside and Verbal shift change report given to LILIYA Pruett (oncoming nurse) by Star Baxter (offgoing nurse). Report included the following information SBAR, Kardex, Procedure Summary, Intake/Output, MAR, Recent Results and Cardiac Rhythm Afib. [address] Ruro[address]io Rader IN REPORT: 
 
Verbal report received from LILIYA Garnett(name) on Seals Trenton III  being received from CCL(unit) for routine progression of care Report consisted of patients Situation, Background, Assessment and  
Recommendations(SBAR). Information from the following report(s) SBAR, Kardex, Procedure Summary, Intake/Output, MAR, Recent Results and Cardiac Rhythm NSR was reviewed with the receiving nurse. Opportunity for questions and clarification was provided. Assessment completed upon patients arrival to unit and care assumed. 1911 Patient arrived to floor via stretcher, VSS. Tele placed and verified with monitor tech. Right groin assessed, dressing is saturated with blood, pressure held. 4x4 and tegaderm placed. No hematoma present. Will continue to monitor groin. Complains of groin pain from holding pressure, Norco administered. 1930 Bedside and Verbal shift change report given to LILIYA Le (oncoming nurse) by Brianna Humphrey (offgoing nurse). Report included the following information SBAR, Kardex, Procedure Summary, Intake/Output, MAR, Recent Results and Cardiac Rhythm NSR.  
 
2010 Patient: Maddie Vizcaino MRN: [id number]    Age: 70 y.o. YOB: 1955 Room/Bed: 450/01 Consent for video monitoring obtained after the below has been explained to the patient/family on 12/4/2018: 
1. They are being monitored continuously in an effort to promote thier safety. 2. That there may be times when the camera will be discontinued to provide care to me to ensure my dignity, such as during bathing or any activity that risks me being exposed.  
3. They have the right to opt out of having this surveillance monitoring at any time. 4. It has been explained to the patient/family and they understand that the hospital does not maintain any recording of this surveillance monitoring.    
Ashley Clark RN

## 2018-12-05 ENCOUNTER — CLINICAL SUPPORT (OUTPATIENT)
Dept: CARDIOLOGY CLINIC | Age: 63
End: 2018-12-05

## 2018-12-05 VITALS
DIASTOLIC BLOOD PRESSURE: 69 MMHG | TEMPERATURE: 97.9 F | OXYGEN SATURATION: 97 % | HEART RATE: 89 BPM | WEIGHT: 192.24 LBS | RESPIRATION RATE: 17 BRPM | BODY MASS INDEX: 26.81 KG/M2 | SYSTOLIC BLOOD PRESSURE: 124 MMHG

## 2018-12-05 DIAGNOSIS — I48.91 ATRIAL FIBRILLATION, UNSPECIFIED TYPE (HCC): Primary | ICD-10-CM

## 2018-12-05 LAB
ANION GAP SERPL CALC-SCNC: 9 MMOL/L (ref 5–15)
BUN SERPL-MCNC: 18 MG/DL (ref 6–20)
BUN/CREAT SERPL: 13 (ref 12–20)
CALCIUM SERPL-MCNC: 8.8 MG/DL (ref 8.5–10.1)
CHLORIDE SERPL-SCNC: 101 MMOL/L (ref 97–108)
CO2 SERPL-SCNC: 24 MMOL/L (ref 21–32)
CREAT SERPL-MCNC: 1.42 MG/DL (ref 0.7–1.3)
ERYTHROCYTE [DISTWIDTH] IN BLOOD BY AUTOMATED COUNT: 13.1 % (ref 11.5–14.5)
GLUCOSE SERPL-MCNC: 142 MG/DL (ref 65–100)
HCT VFR BLD AUTO: 46.9 % (ref 36.6–50.3)
HGB BLD-MCNC: 15 G/DL (ref 12.1–17)
MAGNESIUM SERPL-MCNC: 1.9 MG/DL (ref 1.6–2.4)
MCH RBC QN AUTO: 26.8 PG (ref 26–34)
MCHC RBC AUTO-ENTMCNC: 32 G/DL (ref 30–36.5)
MCV RBC AUTO: 83.9 FL (ref 80–99)
NRBC # BLD: 0 K/UL (ref 0–0.01)
NRBC BLD-RTO: 0 PER 100 WBC
PLATELET # BLD AUTO: 251 K/UL (ref 150–400)
PMV BLD AUTO: 10.1 FL (ref 8.9–12.9)
POTASSIUM SERPL-SCNC: 4.1 MMOL/L (ref 3.5–5.1)
RBC # BLD AUTO: 5.59 M/UL (ref 4.1–5.7)
SODIUM SERPL-SCNC: 134 MMOL/L (ref 136–145)
WBC # BLD AUTO: 7.3 K/UL (ref 4.1–11.1)

## 2018-12-05 PROCEDURE — 74011250637 HC RX REV CODE- 250/637: Performed by: HOSPITALIST

## 2018-12-05 PROCEDURE — 80048 BASIC METABOLIC PNL TOTAL CA: CPT

## 2018-12-05 PROCEDURE — 85027 COMPLETE CBC AUTOMATED: CPT

## 2018-12-05 PROCEDURE — 36415 COLL VENOUS BLD VENIPUNCTURE: CPT

## 2018-12-05 PROCEDURE — 83735 ASSAY OF MAGNESIUM: CPT

## 2018-12-05 PROCEDURE — 74011250637 HC RX REV CODE- 250/637: Performed by: INTERNAL MEDICINE

## 2018-12-05 RX ORDER — COLCHICINE 0.6 MG/1
0.6 CAPSULE ORAL DAILY
Qty: 30 CAP | Refills: 0 | Status: SHIPPED | OUTPATIENT
Start: 2018-12-05 | End: 2019-01-01 | Stop reason: SDUPTHER

## 2018-12-05 RX ORDER — PANTOPRAZOLE SODIUM 40 MG/1
40 TABLET, DELAYED RELEASE ORAL
Qty: 30 TAB | Refills: 0 | Status: SHIPPED | OUTPATIENT
Start: 2018-12-06 | End: 2019-01-01 | Stop reason: SDUPTHER

## 2018-12-05 RX ORDER — LISINOPRIL 5 MG/1
5 TABLET ORAL DAILY
Qty: 30 TAB | Refills: 1 | Status: SHIPPED | OUTPATIENT
Start: 2018-12-06 | End: 2019-01-02 | Stop reason: SDUPTHER

## 2018-12-05 RX ADMIN — LISINOPRIL 5 MG: 5 TABLET ORAL at 08:03

## 2018-12-05 RX ADMIN — Medication 10 ML: at 06:55

## 2018-12-05 RX ADMIN — PANTOPRAZOLE SODIUM 40 MG: 40 TABLET, DELAYED RELEASE ORAL at 06:55

## 2018-12-05 RX ADMIN — METOPROLOL SUCCINATE 50 MG: 50 TABLET, EXTENDED RELEASE ORAL at 08:03

## 2018-12-05 RX ADMIN — ALPRAZOLAM 0.25 MG: 0.25 TABLET ORAL at 02:05

## 2018-12-05 RX ADMIN — COLCHICINE 0.6 MG: 0.6 TABLET, FILM COATED ORAL at 08:03

## 2018-12-05 RX ADMIN — APIXABAN 5 MG: 5 TABLET, FILM COATED ORAL at 08:03

## 2018-12-05 RX ADMIN — TAMSULOSIN HYDROCHLORIDE 0.4 MG: 0.4 CAPSULE ORAL at 08:03

## 2018-12-05 RX ADMIN — AMIODARONE HYDROCHLORIDE 200 MG: 200 TABLET ORAL at 08:03

## 2018-12-05 NOTE — PROGRESS NOTES
Right Elizabeth removed, 10 min pressure applied, no bruising noted at site.gauze and tegaderm applied

## 2018-12-05 NOTE — PROGRESS NOTES
[address] [facility] December 5, 2018 RE: Ben Ding III To Whom It May Concern, This is to certify that Debra Harris has been in the hospital from 11/25 until 12/5. Please feel free to contact my office if you have any questions or concerns. Thank you for your assistance in this matter. Sincerely, Kortney Aly RN  
[phone]

## 2018-12-05 NOTE — PROGRESS NOTES
Problem: Falls - Risk of 
Goal: *Absence of Falls Document Tejas Cash Fall Risk and appropriate interventions in the flowsheet. Outcome: Progressing Towards Goal 
Fall Risk Interventions: 
Mobility Interventions: Assess mobility with egress test, Communicate number of staff needed for ambulation/transfer Medication Interventions: Evaluate medications/consider consulting pharmacy, Patient to call before getting OOB History of Falls Interventions: Consult care management for discharge planning, Evaluate medications/consider consulting pharmacy Problem: Pressure Injury - Risk of 
Goal: *Prevention of pressure injury Document Eamon Scale and appropriate interventions in the flowsheet. Outcome: Progressing Towards Goal 
Pressure Injury Interventions: Activity Interventions: Increase time out of bed, Pressure redistribution bed/mattress(bed type) Mobility Interventions: Pressure redistribution bed/mattress (bed type) Nutrition Interventions: Document food/fluid/supplement intake Comments:  
Visit Vitals /69 (BP 1 Location: Left arm, BP Patient Position: Post activity) Pulse 89 Temp 97.9 °F (36.6 °C) Resp 17 Wt 87.2 kg (192 lb 3.9 oz) SpO2 97% BMI 26.81 kg/m² Last 3 Recorded Weights in this Encounter 12/03/18 3246 12/04/18 0330 12/05/18 4274 Weight: 86.6 kg (190 lb 14.7 oz) 86 kg (189 lb 9.5 oz) 87.2 kg (192 lb 3.9 oz)

## 2018-12-05 NOTE — PROGRESS NOTES
Hospital follow-up PCP transitional care appointment has been scheduled with Dr. Villa Chase for Tuesday, 12/11/18 at 1:30 p.m. Pending patient discharge.   Kelsie Pollock, Care Management Specialist.

## 2018-12-05 NOTE — PROGRESS NOTES
0000: Patient resting in bed at this time without any complaints at this time. 0715: Patient with any uneventful shift overnight. 0745: Bedside and Verbal shift change report given to Tammy Cosme RN (oncoming nurse) by LILIYA Bateman (offgoing nurse). Report included the following information SBAR, Kardex, Intake/Output, MAR, Recent Results, Med Rec Status and Cardiac Rhythm NSR.

## 2018-12-05 NOTE — PROGRESS NOTES
12/05/18 1000 Six Minute Walk Report (PRE) Heart Rate 86  
O2 Saturation 99 Six Minute Walk Report (POST) Heart Rate 83  
O2 Saturation 98 Distance Walked in Feet (ft) 550 ft. Distance in Meters 167.64 meters

## 2018-12-05 NOTE — PROGRESS NOTES
Patient discharged by cardiology services. Will sign off. I have taken the liberty of stopping the ibuprofen resume order and eliminated the 2nd order of amiodarone listed as 400 mg tid to minimize confusion as it has printed I the AVS.confirmed with Nurse Nat Johnston cards only wanted jason in amiodarone 400 mg daily.

## 2018-12-05 NOTE — PROGRESS NOTES
0730 Bedside shift change report given to LILIYA Pruett (oncoming nurse) by Colonel Shaikh (offgoing nurse). Report included the following information SBAR, Kardex, Procedure Summary, Intake/Output, MAR, Recent Results and Cardiac Rhythm NSR.  
1040 Six minute walk performed, patient tolerated well. See note [id number] Yen Corona DISCHARGE SUMMARY from Nurse PATIENT INSTRUCTIONS: 
 
After general anesthesia or intravenous sedation, for 24 hours or while taking prescription Narcotics: · Limit your activities · Do not drive and operate hazardous machinery · Do not make important personal or business decisions · Do  not drink alcoholic beverages · If you have not urinated within 8 hours after discharge, please contact your surgeon on call. Report the following to your surgeon: 
· Excessive pain, swelling, redness or odor of or around the surgical area · Temperature over 100.5 · Nausea and vomiting lasting longer than 4 hours or if unable to take medications · Any signs of decreased circulation or nerve impairment to extremity: change in color, persistent  numbness, tingling, coldness or increase pain · Any questions What to do at Home: 
Recommended activity: Activity as tolerated, If you experience any of the following symptoms see instructions, please follow up with Dr. Kyleigh Barker, PCP. *  Please give a list of your current medications to your Primary Care Provider. *  Please update this list whenever your medications are discontinued, doses are 
    changed, or new medications (including over-the-counter products) are added. *  Please carry medication information at all times in case of emergency situations. These are general instructions for a healthy lifestyle: No smoking/ No tobacco products/ Avoid exposure to second hand smoke Surgeon General's Warning:  Quitting smoking now greatly reduces serious risk to your health.  
 
Obesity, smoking, and sedentary lifestyle greatly increases your risk for illness A healthy diet, regular physical exercise & weight monitoring are important for maintaining a healthy lifestyle You may be retaining fluid if you have a history of heart failure or if you experience any of the following symptoms:  Weight gain of 3 pounds or more overnight or 5 pounds in a week, increased swelling in our hands or feet or shortness of breath while lying flat in bed. Please call your doctor as soon as you notice any of these symptoms; do not wait until your next office visit. Recognize signs and symptoms of STROKE: 
 
F-face looks uneven A-arms unable to move or move unevenly S-speech slurred or non-existent T-time-call 911 as soon as signs and symptoms begin-DO NOT go Back to bed or wait to see if you get better-TIME IS BRAIN. Warning Signs of HEART ATTACK Call 911 if you have these symptoms: 
? Chest discomfort. Most heart attacks involve discomfort in the center of the chest that lasts more than a few minutes, or that goes away and comes back. It can feel like uncomfortable pressure, squeezing, fullness, or pain. ? Discomfort in other areas of the upper body. Symptoms can include pain or discomfort in one or both arms, the back, neck, jaw, or stomach. ? Shortness of breath with or without chest discomfort. ? Other signs may include breaking out in a cold sweat, nausea, or lightheadedness. Don't wait more than five minutes to call [address]! Fast action can save your life. Calling 911 is almost always the fastest way to get lifesaving treatment. Emergency Medical Services staff can begin treatment when they arrive  up to an hour sooner than if someone gets to the hospital by car. The discharge information has been reviewed with the patient. The patient verbalized understanding. Discharge medications reviewed with the patient and appropriate educational materials and side effects teaching were provided. ___________________________________________________________________________________________________________________________________

## 2018-12-05 NOTE — DISCHARGE SUMMARY
Cardiology Discharge Summary     Patient ID:  Guerrero Cole  [id number]  70 y.o.  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 Successful completion of Afib/flutter ablation. No CP or SOB following. NSR on telemetry  Stable for discharge    Visit Vitals  /69 (BP 1 Location: Left arm, BP Patient Position: Post activity)   Pulse 89   Temp 97.9 °F (36.6 °C)   Resp 17   Wt 192 lb 3.9 oz (87.2 kg)   SpO2 97%   BMI 26.81 kg/m²       Physical Exam  Abdomen: soft, non-tender. Bowel sounds normal.   Extremities: no LE edema, + PP bilaterally   Heart: regular rate and rhythm, S1, S2 normal, no murmurs, clicks, rubs or gallops  Lungs: clear to auscultation bilaterally  Neck: supple, symmetrical, trachea midline,   Neurologic: Grossly normal  Pulses: 2+ and symmetrical    Labs:   Recent Labs     12/05/18  0351 12/04/18  0337 12/03/18  0150   WBC 7.3 4.7 5.9   HGB 15.0 14.8 14.1   HCT 46.9 45.9 44.3    245 209     Recent Labs     12/05/18  0351 12/04/18  0337 12/03/18  0150   * 138 138   K 4.1 4.2 4.2    106 103   CO2 24 24 24   * 100 96   BUN 18 17 18   CREA 1.42* 1.21 1.19   CA 8.8 8.7 9.0   MG 1.9 2.1 2.0       No results for input(s): TROIQ, CPK, CKMB in the last 72 hours.       Disposition: home    Patient Instructions:      Medication List      START taking these medications    lisinopril 5 mg tablet  Commonly known as:  PRINIVIL, ZESTRIL  Take 1 Tab by mouth daily. Start taking on:  12/6/2018     pantoprazole 40 mg tablet  Commonly known as:  PROTONIX  Take 1 Tab by mouth Daily (before breakfast). Start taking on:  12/6/2018        CONTINUE taking these medications    acetaminophen 500 mg tablet  Commonly known as:  TYLENOL  Take 1 Tab by mouth every four (4) hours as needed. * amiodarone 400 mg tablet  Commonly known as:  PACERONE  Take 1 Tab by mouth three (3) times daily. * amiodarone 400 mg tablet  Commonly known as:  PACERONE  Take 1 Tab by mouth daily. apixaban 5 mg tablet  Commonly known as:  ELIQUIS  Take 1 Tab by mouth two (2) times a day. colchicine 0.6 mg capsule  Commonly known as:  MITIGARE  Take 1 Cap by mouth daily. FLOMAX 0.4 mg capsule  Generic drug:  tamsulosin     ibuprofen 200 mg tablet  Commonly known as:  MOTRIN     metoprolol succinate 50 mg XL tablet  Commonly known as:  TOPROL-XL  Take 1 Tab by mouth daily. * This list has 2 medication(s) that are the same as other medications prescribed for you. Read the directions carefully, and ask your doctor or other care provider to review them with you. STOP taking these medications    digoxin 0.125 mg tablet  Commonly known as:  LANOXIN     dilTIAZem (CARDIZEM) infusion           Where to Get Your Medications      Information about where to get these medications is not yet available    Ask your nurse or doctor about these medications  · lisinopril 5 mg tablet  · pantoprazole 40 mg tablet         Reference discharge instructions provided by nursing for diet and activity.     Follow-up with   Dr. Loni Hemphill office will contact patient for 30 day follow up    Signed:  Marine Parrish PA-C

## 2018-12-05 NOTE — PROGRESS NOTES
On transfer to bed with slid board, right groin checked and damp with blood, dsg off, no bleeding noted at site with dsg off. Gauze and tegaderm dsg applied. pt instructed to call nurse if groin feels warm and or wet.

## 2018-12-06 ENCOUNTER — TELEPHONE (OUTPATIENT)
Dept: CARDIOLOGY CLINIC | Age: 63
End: 2018-12-06

## 2018-12-06 NOTE — TELEPHONE ENCOUNTER
Post Anesthesia Care Unit [address] [address]    Phone:  [phone]                                       After Visit Summary   3/2/2017    Rigoberto Holguin    MRN: [id number]           After Visit Summary Signature Page     I have received my discharge instructions, and my questions have been answered. I have discussed any challenges I see with this plan with the nurse or doctor.    ..........................................................................................................................................  Patient/Patient Representative Signature      ..........................................................................................................................................  Patient Representative Print Name and Relationship to Patient    ..................................................               ................................................  Date                                            Time    ..........................................................................................................................................  Reviewed by Signature/Title    ...................................................              ..............................................  Date                                                            Time           Pt is calling in regards to home monitor that was supposed to be sent to patients house. Pt wasn't sure if that was prescribed for the patient to wear it due to not hearing any information in regards to wearing one. Phone# [phone]  Thanks.

## 2018-12-07 ENCOUNTER — OFFICE VISIT (OUTPATIENT)
Dept: FAMILY MEDICINE CLINIC | Age: 63
End: 2018-12-07

## 2018-12-07 VITALS
SYSTOLIC BLOOD PRESSURE: 105 MMHG | RESPIRATION RATE: 16 BRPM | WEIGHT: 188 LBS | OXYGEN SATURATION: 98 % | HEIGHT: 71 IN | HEART RATE: 71 BPM | BODY MASS INDEX: 26.32 KG/M2 | TEMPERATURE: 97.9 F | DIASTOLIC BLOOD PRESSURE: 71 MMHG

## 2018-12-07 DIAGNOSIS — G47.33 OSA (OBSTRUCTIVE SLEEP APNEA): Primary | ICD-10-CM

## 2018-12-07 NOTE — PROGRESS NOTES
Narciso Gamboa is a 70 y.o. male   Chief Complaint   Patient presents with   [facility] Follow Up    Pt here for follow up with a-fib and flutter with RVR and had an ablation which was successful with Dr Lucinda Medrano. However pt stats cardio saw signs of Sleep apnea while he under anesthesia. Pt has hx of sleep apnea and machine broke and needed another sleep study but had to see sleep which he didn't then lost 50 lbs and assumed it was gone. Pt does snore. he is a 70y.o. year old male who presents for evalution. Reviewed PmHx, RxHx, FmHx, SocHx, AllgHx and updated and dated in the chart. Review of Systems - negative except as listed above in the HPI    Objective:     Vitals:    12/07/18 1620   BP: 105/71   Pulse: 71   Resp: 16   Temp: 97.9 °F (36.6 °C)   TempSrc: Oral   SpO2: 98%   Weight: 188 lb (85.3 kg)   Height: 5' 11\" (1.803 m)       Current Outpatient Medications   Medication Sig    pantoprazole (PROTONIX) 40 mg tablet Take 1 Tab by mouth Daily (before breakfast).  lisinopril (PRINIVIL, ZESTRIL) 5 mg tablet Take 1 Tab by mouth daily.  colchicine (MITIGARE) 0.6 mg capsule Take 1 Cap by mouth daily.  colchicine (MITIGARE) 0.6 mg capsule Take 1 Cap by mouth daily.  acetaminophen (TYLENOL) 500 mg tablet Take 1 Tab by mouth every four (4) hours as needed.  amiodarone (PACERONE) 400 mg tablet Take 1 Tab by mouth daily.  apixaban (ELIQUIS) 5 mg tablet Take 1 Tab by mouth two (2) times a day.  metoprolol succinate (TOPROL-XL) 50 mg XL tablet Take 1 Tab by mouth daily.  tamsulosin (FLOMAX) 0.4 mg capsule Take 0.4 mg by mouth daily as needed (trouble urinating). No current facility-administered medications for this visit.         Physical Examination: General appearance - alert, well appearing, and in no distress  Mental status - alert, oriented to person, place, and time  Chest - clear to auscultation, no wheezes, rales or rhonchi, symmetric air entry  Heart - normal rate, regular rhythm, normal S1, S2, no murmurs, rubs, clicks or gallops      Assessment/ Plan:   Diagnoses and all orders for this visit:    1. RAMIREZ (obstructive sleep apnea)  -     SLEEP MEDICINE REFERRAL       Follow-up Disposition:  Return if symptoms worsen or fail to improve. I have discussed the diagnosis with the patient and the intended plan as seen in the above orders. The patient has received an after-visit summary and questions were answered concerning future plans. Pt conveyed understanding of plan.     Medication Side Effects and Warnings were discussed with patient      Tony Coleman, DO

## 2018-12-07 NOTE — PATIENT INSTRUCTIONS

## 2018-12-07 NOTE — PROGRESS NOTES
Chief Complaint   Patient presents with   [facility] Follow Up     Patient presents in office today for JUAN CARLOS CORTEZ f/u from [facility] and transferred to [facility]. Admitted on 11/25/18 and discharged on 12/3/18. Admitted for Afib and A flutter. States that he needs to get a referral for a sleep study. No other concerns. 1. Have you been to the ER, urgent care clinic since your last visit? Hospitalized since your last visit? Yes When: 11/25/18 at [facility] for Afib     2. Have you seen or consulted any other health care providers outside of the [address] [place] [address] [place] since your last visit? Include any pap smears or colon screening.  No    Learning Assessment 7/28/2017   PRIMARY LEARNER Patient   HIGHEST LEVEL OF EDUCATION - PRIMARY LEARNER  GRADUATED HIGH SCHOOL OR GED   BARRIERS PRIMARY LEARNER NONE   CO-LEARNER CAREGIVER No   PRIMARY LANGUAGE ENGLISH   LEARNER PREFERENCE PRIMARY DEMONSTRATION   ANSWERED BY patient   RELATIONSHIP SELF

## 2018-12-07 NOTE — TELEPHONE ENCOUNTER
Pt girlfriend Ms. Raymon Brown is calling in regards to previous encounter. Pt was told he would receive a home monitor but never received one as of yet. Pt had surgery on Tuesday December 4 and was notified that he would be getting one. Ms. Raymon Brown wanted to know when the monitor would come in. Ms. Raymon Brown wanted to know if patient can come pick one up instead they wouldn't mind to doing that. Phone# [phone]    Thanks.

## 2018-12-07 NOTE — TELEPHONE ENCOUNTER
Pt called following up on getting a loop monitor mailed to his listed home address  Pt states that he now lives with his girlfriend and says he provided that address already and states he did not receive a vm today. Pt requesting another call from office.   Phone # [phone]  Thanks

## 2018-12-07 NOTE — TELEPHONE ENCOUNTER
Returned pts call & informed him about his loop that had already been ordered by Dr Bridger Coleman on day he was in [facility]. [employer] is rushing so should receive tomorrow. Explained Ariana Bello had l/m to inform pt earlier & he stated he didn't get a message. Gave him the ph# we had & pt stated that isnt his #. Apologized to pt.

## 2018-12-07 NOTE — TELEPHONE ENCOUNTER
Call returned. Per Lindsay Murphy, a villavicencio was placed on monitor order and it should arrive no later than tomorrow. Voicemail message left.

## 2018-12-08 ENCOUNTER — TELEPHONE (OUTPATIENT)
Dept: SLEEP MEDICINE | Age: 63
End: 2018-12-08

## 2018-12-12 ENCOUNTER — HOME CARE VISIT (OUTPATIENT)
Dept: SCHEDULING | Facility: HOME HEALTH | Age: 63
End: 2018-12-12

## 2018-12-12 ENCOUNTER — TELEPHONE (OUTPATIENT)
Dept: CARDIOLOGY CLINIC | Age: 63
End: 2018-12-12

## 2018-12-12 DIAGNOSIS — I48.91 ATRIAL FIBRILLATION, UNSPECIFIED TYPE (HCC): Primary | ICD-10-CM

## 2018-12-12 PROCEDURE — G0299 HHS/HOSPICE OF RN EA 15 MIN: HCPCS

## 2018-12-12 NOTE — TELEPHONE ENCOUNTER
Please call Tommie Loya with [facility] at [phone]  Regarding the patients medications.      Thanks

## 2018-12-13 ENCOUNTER — HOSPITAL ENCOUNTER (OUTPATIENT)
Dept: CARDIAC REHAB | Age: 63
Discharge: HOME | End: 2018-12-13
Payer: COMMERCIAL

## 2018-12-13 ENCOUNTER — OFFICE VISIT (OUTPATIENT)
Dept: SLEEP MEDICINE | Age: 63
End: 2018-12-13

## 2018-12-13 VITALS
DIASTOLIC BLOOD PRESSURE: 68 MMHG | HEART RATE: 74 BPM | WEIGHT: 190 LBS | SYSTOLIC BLOOD PRESSURE: 110 MMHG | BODY MASS INDEX: 26.6 KG/M2 | OXYGEN SATURATION: 97 % | HEIGHT: 71 IN

## 2018-12-13 VITALS — WEIGHT: 189 LBS | BODY MASS INDEX: 26.46 KG/M2 | HEIGHT: 71 IN

## 2018-12-13 DIAGNOSIS — G47.33 OSA (OBSTRUCTIVE SLEEP APNEA): Primary | ICD-10-CM

## 2018-12-13 PROCEDURE — 93798 PHYS/QHP OP CAR RHAB W/ECG: CPT

## 2018-12-13 NOTE — PATIENT INSTRUCTIONS

## 2018-12-13 NOTE — PROGRESS NOTES
[address] [facility]. [place], [address]  Tel.  [phone]  Fax. [address] [facility] [address]  [place], [address] [facility]  Tel.  [phone]  Fax. [phone] [address]  Tel.  [phone]  Fax. [phone]       Chief Complaint       Chief Complaint   Patient presents with    Sleep Problem     NP; ref Dr Lila Jarvis; consult for sleep; discontinued cpap since 5 years ago (after broke); approx 45 lbs lost since the last sleep study       HPI      Alben Jeans III is a  70 y.o.  male seen for evaluation of a sleep disorder  . He notes recent diagnosis of atrial fibrillation/a flutter for which she underwent ablation procedure. He has a past history of sleep apnea for which he was started on CPAP. He believes that the evaluation was done at [facility]. He does not have a copy of that evaluation. He notes that his unit broke. He had lost approximate 50 pounds and he did not pursue replacing the unit or undergoing a reevaluation. He notes that he was told of apparent apnea when he was sedated. The patient retires at 10 pm -12 am and awakens at 6-7 am. The patient notes that he will experience frequent awakening from sleep. In general he is able to return to sleep after awakening. he tends to awaken spontaneously. The patient has not undergone recent diagnostic testing for the current problems. [place] Sleepiness Score: 6       No Known Allergies    Current Outpatient Medications   Medication Sig Dispense Refill    pantoprazole (PROTONIX) 40 mg tablet Take 1 Tab by mouth Daily (before breakfast). 30 Tab 0    lisinopril (PRINIVIL, ZESTRIL) 5 mg tablet Take 1 Tab by mouth daily. 30 Tab 1    colchicine (MITIGARE) 0.6 mg capsule Take 1 Cap by mouth daily. 30 Cap 0    acetaminophen (TYLENOL) 500 mg tablet Take 1 Tab by mouth every four (4) hours as needed.  30 Tab 0    amiodarone (PACERONE) 400 mg tablet Take 1 Tab by mouth daily. 30 Tab 0    apixaban (ELIQUIS) 5 mg tablet Take 1 Tab by mouth two (2) times a day. 30 Tab 0    metoprolol succinate (TOPROL-XL) 50 mg XL tablet Take 1 Tab by mouth daily. 30 Tab 0    tamsulosin (FLOMAX) 0.4 mg capsule Take 0.4 mg by mouth daily as needed (trouble urinating).  colchicine (MITIGARE) 0.6 mg capsule Take 1 Cap by mouth daily. 30 Cap 0        He  has a past medical history of A-fib ([address] Anxiety, Atrial flutter ([address] BPH, Depression, ED (erectile dysfunction), Hearing loss, Heart failure ([address] Herniated disc, Hypogonadism, and Sleep apnea. He  has a past surgical history that includes echo stress; hx hernia repair; and hx orthopaedic. He family history includes Cancer in his mother and sister; Dementia in his father; Diabetes in his father; Hypertension in his father. He  reports that he has quit smoking. he has never used smokeless tobacco. He reports that he drinks about 3.0 oz of alcohol per week. He reports that he does not use drugs. Review of Systems:  Review of Systems   Constitutional: Negative for chills and fever. HENT: Negative for hearing loss and tinnitus. Eyes: Negative for blurred vision and double vision. Cardiovascular: Positive for chest pain. Negative for palpitations. Gastrointestinal: Negative for abdominal pain and heartburn. Genitourinary: Positive for frequency. Negative for urgency. Musculoskeletal: Negative for back pain, joint pain and neck pain. Skin: Negative for itching and rash. Neurological: Positive for dizziness. Negative for headaches. Endo/Heme/Allergies: Does not bruise/bleed easily. Psychiatric/Behavioral: Negative for depression. Objective:     Visit Vitals  /68   Pulse 74   Ht 5' 11\" (1.803 m)   Wt 190 lb (86.2 kg)   SpO2 97%   BMI 26.50 kg/m²     Body mass index is 26.5 kg/m².     General:   Conversant, cooperative   Eyes:  Pupils equal and reactive, no nystagmus   Oropharynx: Mallampati score IV,  tongue scalloped   Tonsils:      Neck:   No carotid bruits; Neck circ. in \"inches\": 16   Chest/Lungs:  Clear on auscultation    CVS:  Normal rate, regular rhythm   Skin:  Warm to touch; no obvious rashes   Neuro:  Speech fluent, face symmetrical, tongue movement normal   Psych:  Normal affect,  normal countenance        Assessment:       ICD-10-CM ICD-9-CM    1. RAMIREZ (obstructive sleep apnea) G47.33 327.23 SLEEP STUDY UNATTENDED, 4 CHANNEL     Past history of sleep apnea without current documentation regarding severity or pressure parameters. He had not used PAP for a number of years. Given his recent history of atrial fibrillation/flutter he will be reevaluated with a home sleep test.  The results will be reviewed with him. Plan:     Orders Placed This Encounter    SLEEP STUDY UNATTENDED, 4 CHANNEL     Order Specific Question:   Reason for Exam     Answer:   history of sleep apnea; reevaluation       * Patient has a history and examination consistent with the diagnosis of sleep apnea. *Home sleep testing was ordered for evaluation. * He was provided information on sleep apnea including corresponding risk factors and the importance of proper treatment. * Treatment options if indicated were reviewed today. Instructions:.  o Do not engage in activities requiring a normal degree of alertness if fatigue is present. o The patient understands that untreated or undertreated sleep apnea could impair judgement and the ability to function normally during the day.  o Call or return if symptoms worsen or persist.          Rohan Tamayo MD, [facility]  Electronically signed 12/13/18       This note was created using voice recognition software. Despite editing, there may be syntax errors. This note will not be viewable in [address]

## 2018-12-13 NOTE — TELEPHONE ENCOUNTER
Returned call. Spoke with Lux Barrera from [facility]. Stated patient does not have enough Eliquis to last until he sees Dr. Emmy Waldrop. Will send patient $10 copay card and send refill to local pharmacy. Also called to verify amiodarone dosing. Per [facility] nurse, patient is taking 400mg TID. Discharge note has 2 separate entries for amio. 400mg TID and another entry for 400mg qday. Per progress note from REBEL Weeks, patient should continue amiodarone 200mg daily. Please advise.

## 2018-12-13 NOTE — CARDIO/PULMONARY
Cedrick Foster III  [birth year] presented for cardiac rehab orientation and exercise tolerance test today with a primary diagnosis of HF.    LVEF is 20 . Pt is not  and lives with his girlfriend  Works f/t in [employer]   Depression score PHQ9 is 8 and this is considered normal. The result was discussed with patient who affirms score to be accurate. Pt denies depression at this time. Patient denied chest pain or SOB during 6 minute walk on treadmill at 3.4 mph, with RPE 12. Cardiac rhythm was SR with isolated PVC at rest.   Exercise plan was developed. Pt will attend cardiac rehab 2-3times per week. Pt verbalized plan to engage in home exercise after a few cardiac rehab visits. Cardiac Rehab education book given and reviewed with patient.   Luba Sanchez RN

## 2018-12-13 NOTE — PROGRESS NOTES
I have reviewed the notes, assessments, and/or procedures performed by Lexis Simms MD  , I concur with her/his documentation of Alben Jeans III.

## 2018-12-14 ENCOUNTER — TELEPHONE (OUTPATIENT)
Dept: SLEEP MEDICINE | Age: 63
End: 2018-12-14

## 2018-12-14 DIAGNOSIS — G47.33 OBSTRUCTIVE SLEEP APNEA (ADULT) (PEDIATRIC): Primary | ICD-10-CM

## 2018-12-14 NOTE — TELEPHONE ENCOUNTER
HSAT demonstrated sleep disordered breathing characterized by an overall AHI of 18.7/h associated with arterial desaturations to 81%. Snoring during 6.5% of the recording. Events were primarily supine with the supine-related AHI of 34.9/h. Recommendation: APAP 7-16 cm    Chief technologist: Please review the HSAT results with the patient. Prescription has been generated for APAP 7-16 cm. Please schedule first compliance appointment.

## 2018-12-14 NOTE — TELEPHONE ENCOUNTER
Called patient. Stated he is taking amiodarone 200mg qday. He is going to cardiac rehab 3 times a week. Will need refill of Eliquis, savings card mailed yesterday. Instructed patient to contact office prior to 1/16 appointment if needed. Understanding expressed. Requested Prescriptions     Signed Prescriptions Disp Refills    apixaban (ELIQUIS) 5 mg tablet 60 Tab 6     Sig: Take 1 Tab by mouth two (2) times a day.      Authorizing Provider: Estela Hanna     Ordering User: Vania Brady     Verbal order for refill per Dr. Nii Bolivar

## 2018-12-14 NOTE — TELEPHONE ENCOUNTER
Pt calling to speak with Maurice again about his medication dosage. He can be reached at [phone].      [employer]

## 2018-12-17 ENCOUNTER — DOCUMENTATION ONLY (OUTPATIENT)
Dept: SLEEP MEDICINE | Age: 63
End: 2018-12-17

## 2018-12-17 ENCOUNTER — HOSPITAL ENCOUNTER (OUTPATIENT)
Dept: CARDIAC REHAB | Age: 63
Discharge: HOME | End: 2018-12-17
Payer: COMMERCIAL

## 2018-12-17 VITALS — WEIGHT: 189 LBS | BODY MASS INDEX: 26.36 KG/M2

## 2018-12-17 PROCEDURE — 93798 PHYS/QHP OP CAR RHAB W/ECG: CPT

## 2018-12-17 NOTE — PROGRESS NOTES
This note is being routed to [address] Sleep Medicine consult note and sleep study report in patient's chart for review.     Thank you for the referral.

## 2018-12-19 ENCOUNTER — HOSPITAL ENCOUNTER (OUTPATIENT)
Dept: CARDIAC REHAB | Age: 63
Discharge: HOME | End: 2018-12-19
Payer: COMMERCIAL

## 2018-12-19 VITALS — WEIGHT: 190 LBS | BODY MASS INDEX: 26.5 KG/M2

## 2018-12-19 PROCEDURE — 93798 PHYS/QHP OP CAR RHAB W/ECG: CPT

## 2018-12-21 ENCOUNTER — HOSPITAL ENCOUNTER (OUTPATIENT)
Dept: CARDIAC REHAB | Age: 63
Discharge: HOME | End: 2018-12-21
Payer: COMMERCIAL

## 2018-12-21 VITALS — BODY MASS INDEX: 26.64 KG/M2 | WEIGHT: 191 LBS

## 2018-12-21 PROCEDURE — 93798 PHYS/QHP OP CAR RHAB W/ECG: CPT

## 2018-12-26 ENCOUNTER — DOCUMENTATION ONLY (OUTPATIENT)
Dept: SLEEP MEDICINE | Age: 63
End: 2018-12-26

## 2018-12-26 NOTE — PROGRESS NOTES
Patient called to inform his insurance will change to [facility] from January. Due to that, we told him, we will fax PAP order to [facility]. Order faxed.

## 2018-12-28 ENCOUNTER — HOSPITAL ENCOUNTER (OUTPATIENT)
Dept: CARDIAC REHAB | Age: 63
Discharge: HOME | End: 2018-12-28
Payer: COMMERCIAL

## 2018-12-28 ENCOUNTER — DOCUMENTATION ONLY (OUTPATIENT)
Dept: SLEEP MEDICINE | Age: 63
End: 2018-12-28

## 2018-12-28 VITALS — WEIGHT: 191 LBS | BODY MASS INDEX: 26.64 KG/M2

## 2018-12-28 PROCEDURE — 93798 PHYS/QHP OP CAR RHAB W/ECG: CPT

## 2018-12-28 NOTE — PROGRESS NOTES
Patient called to inform Isaac Dominguez did not receive patient's new insurance information. Re-faxed over new insurance and informed patient that it has been re-faxed.

## 2018-12-31 ENCOUNTER — HOSPITAL ENCOUNTER (OUTPATIENT)
Dept: CARDIAC REHAB | Age: 63
Discharge: HOME | End: 2018-12-31
Payer: COMMERCIAL

## 2018-12-31 VITALS — BODY MASS INDEX: 26.64 KG/M2 | WEIGHT: 191 LBS

## 2018-12-31 PROCEDURE — 93798 PHYS/QHP OP CAR RHAB W/ECG: CPT

## 2019-01-02 ENCOUNTER — HOSPITAL ENCOUNTER (OUTPATIENT)
Dept: CARDIAC REHAB | Age: 64
Discharge: HOME | End: 2019-01-02
Payer: COMMERCIAL

## 2019-01-02 VITALS — WEIGHT: 191 LBS | BODY MASS INDEX: 26.64 KG/M2

## 2019-01-02 PROCEDURE — 93798 PHYS/QHP OP CAR RHAB W/ECG: CPT

## 2019-01-02 RX ORDER — PANTOPRAZOLE SODIUM 40 MG/1
TABLET, DELAYED RELEASE ORAL
Qty: 30 TAB | Refills: 0 | Status: SHIPPED | OUTPATIENT
Start: 2019-01-02 | End: 2019-01-02 | Stop reason: SDUPTHER

## 2019-01-02 RX ORDER — LISINOPRIL 5 MG/1
5 TABLET ORAL DAILY
Qty: 30 TAB | Refills: 3 | Status: SHIPPED | OUTPATIENT
Start: 2019-01-02 | End: 2019-05-29 | Stop reason: SDUPTHER

## 2019-01-02 RX ORDER — PANTOPRAZOLE SODIUM 40 MG/1
40 TABLET, DELAYED RELEASE ORAL DAILY
Qty: 30 TAB | Refills: 0 | Status: SHIPPED | OUTPATIENT
Start: 2019-01-02 | End: 2019-03-22

## 2019-01-02 RX ORDER — METOPROLOL SUCCINATE 50 MG/1
50 TABLET, EXTENDED RELEASE ORAL DAILY
Qty: 30 TAB | Refills: 3 | Status: SHIPPED | OUTPATIENT
Start: 2019-01-02 | End: 2019-04-17

## 2019-01-02 RX ORDER — AMIODARONE HYDROCHLORIDE 400 MG/1
400 TABLET ORAL DAILY
Qty: 30 TAB | Refills: 3 | Status: SHIPPED | OUTPATIENT
Start: 2019-01-02 | End: 2019-01-16 | Stop reason: ALTCHOICE

## 2019-01-02 RX ORDER — COLCHICINE 0.6 MG/1
0.6 CAPSULE ORAL DAILY
Qty: 30 CAP | Refills: 0 | OUTPATIENT
Start: 2019-01-02

## 2019-01-02 RX ORDER — TAMSULOSIN HYDROCHLORIDE 0.4 MG/1
0.4 CAPSULE ORAL
OUTPATIENT
Start: 2019-01-02

## 2019-01-02 RX ORDER — COLCHICINE 0.6 MG/1
CAPSULE ORAL
Qty: 30 CAP | Refills: 0 | Status: SHIPPED | OUTPATIENT
Start: 2019-01-02 | End: 2019-01-03 | Stop reason: SDUPTHER

## 2019-01-02 NOTE — TELEPHONE ENCOUNTER
Requested Prescriptions     Signed Prescriptions Disp Refills    amiodarone (PACERONE) 400 mg tablet 30 Tab 3     Sig: Take 1 Tab by mouth daily. Authorizing Provider: Griselda Mireles User: Daiana Guerrero    lisinopril (PRINIVIL, ZESTRIL) 5 mg tablet 30 Tab 3     Sig: Take 1 Tab by mouth daily. Authorizing Provider: Griselda Mireles User: Daiana Guerrero    metoprolol succinate (TOPROL-XL) 50 mg XL tablet 30 Tab 3     Sig: Take 1 Tab by mouth daily. Authorizing Provider: Griselda Mireles User: Daiana Guerrero    pantoprazole (PROTONIX) 40 mg tablet 30 Tab 0     Sig: Take 1 Tab by mouth daily. Before Breakfast     Authorizing Provider: Griselda العلي     Ordering User: Daiana Guerrero     Refused Prescriptions Disp Refills    colchicine (MITIGARE) 0.6 mg capsule 30 Cap 0     Sig: Take 1 Cap by mouth daily. Refused By: Daiana Guerrero     Reason for Refusal: Prescriber not at this practice    tamsulosin (FLOMAX) 0.4 mg capsule       Sig: Take 1 Cap by mouth daily as needed (trouble urinating). Refused By: Daiana Guerrero     Reason for Refusal: Prescriber not at this practice     Refills per verbal order Dr. Chitra Chapman.

## 2019-01-03 ENCOUNTER — TELEPHONE (OUTPATIENT)
Dept: FAMILY MEDICINE CLINIC | Age: 64
End: 2019-01-03

## 2019-01-03 RX ORDER — COLCHICINE 0.6 MG/1
0.6 CAPSULE ORAL DAILY
Qty: 30 CAP | Refills: 3 | Status: SHIPPED | OUTPATIENT
Start: 2019-01-03 | End: 2019-01-04 | Stop reason: SDUPTHER

## 2019-01-03 NOTE — TELEPHONE ENCOUNTER
Reviewed chart and this is not a patient of [facility]. Was seen in the hopsital by us on service, but does not come here. Medication was refilled yesterday by another practice.

## 2019-01-03 NOTE — TELEPHONE ENCOUNTER
Requested Prescriptions     Signed Prescriptions Disp Refills    colchicine (MITIGARE) 0.6 mg capsule 30 Cap 3     Sig: Take 1 Cap by mouth daily. Authorizing Provider: Jose Wynne     Ordering User: Little Buck     Refill per verbal order Dr. Lindajo Olszewski.

## 2019-01-03 NOTE — TELEPHONE ENCOUNTER
Received a refill request address to Dr. Mayelin Deluca stating the patient needs arefill of Amiodarone HCL 200MG taken twice a day. Medication found in chart but dosage shows 400MG once a day.

## 2019-01-04 RX ORDER — COLCHICINE 0.6 MG/1
0.6 TABLET ORAL DAILY
Qty: 90 TAB | Refills: 0 | Status: SHIPPED | OUTPATIENT
Start: 2019-01-04 | End: 2019-01-16

## 2019-01-04 NOTE — TELEPHONE ENCOUNTER
Requested Prescriptions     Signed Prescriptions Disp Refills    colchicine 0.6 mg tablet 90 Tab 0     Sig: Take 1 Tab by mouth daily.      Authorizing Provider: Becky Libman     Ordering User: Doug Donovan     Refill for 90 day supply per verbal order Dr. Gretchen Erickson and per faxed  pharmacy request.

## 2019-01-10 ENCOUNTER — HOSPITAL ENCOUNTER (OUTPATIENT)
Dept: CARDIAC REHAB | Age: 64
Discharge: HOME | End: 2019-01-10
Payer: COMMERCIAL

## 2019-01-10 VITALS — BODY MASS INDEX: 26.64 KG/M2 | WEIGHT: 191 LBS

## 2019-01-10 PROCEDURE — 93798 PHYS/QHP OP CAR RHAB W/ECG: CPT

## 2019-01-15 ENCOUNTER — DOCUMENTATION ONLY (OUTPATIENT)
Dept: CARDIOLOGY CLINIC | Age: 64
End: 2019-01-15

## 2019-01-16 ENCOUNTER — OFFICE VISIT (OUTPATIENT)
Dept: CARDIOLOGY CLINIC | Age: 64
End: 2019-01-16

## 2019-01-16 ENCOUNTER — HOSPITAL ENCOUNTER (OUTPATIENT)
Dept: CARDIAC REHAB | Age: 64
End: 2019-01-16
Payer: COMMERCIAL

## 2019-01-16 VITALS
SYSTOLIC BLOOD PRESSURE: 128 MMHG | OXYGEN SATURATION: 97 % | BODY MASS INDEX: 27.77 KG/M2 | HEART RATE: 76 BPM | WEIGHT: 198.4 LBS | DIASTOLIC BLOOD PRESSURE: 76 MMHG | HEIGHT: 71 IN | RESPIRATION RATE: 16 BRPM

## 2019-01-16 DIAGNOSIS — I48.91 ATRIAL FIBRILLATION, UNSPECIFIED TYPE (HCC): Primary | ICD-10-CM

## 2019-01-16 DIAGNOSIS — Z98.890 STATUS POST ABLATION OF ATRIAL FIBRILLATION: ICD-10-CM

## 2019-01-16 DIAGNOSIS — Z86.79 STATUS POST ABLATION OF ATRIAL FIBRILLATION: ICD-10-CM

## 2019-01-16 NOTE — PROGRESS NOTES
HISTORY OF PRESENTING ILLNESS      Zena Canales III is a 70 y.o. male with persistent atrial fibrillation/atrial flutter who underwent AF and atrial flutter ablation now presenting for follow-up. He discontinued his colchicine for pericarditis recently. 30-day monitor demonstrates sinus rhythm throughout. He remains on his Eliquis and amiodarone. He denies symptomatic recurrence.        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    Atrial fibrillation with RVR ([address] 11/25/2018    Memory loss 09/04/2014    Lumbar radiculitis 07/18/2013    Hypogonadism male 04/14/2011    Hyperlipemia 02/04/2011    Pre-diabetes 01/06/2011    Sleep apnea 12/21/2010    RAMIREZ (obstructive sleep apnea) 05/17/2010    ED (erectile dysfunction) 05/17/2010    Hypogonadism 05/17/2010    Seasonal allergic reaction 05/17/2010    Depression with anxiety 05/17/2010           PAST MEDICAL HISTORY     Past Medical History:   Diagnosis Date    A-fib [facility])     Anxiety     Atrial flutter (HCC)     BPH     Depression     ED (erectile dysfunction)     Hearing loss     Heart failure ([address]     11/25/18 with afib RVR    Herniated disc     Hypogonadism     RAMIREZ on CPAP 2019    Sleep apnea            PAST SURGICAL HISTORY     Past Surgical History:   Procedure Laterality Date    ECHO STRESS      HX HERNIA REPAIR      hernia repair \"20 years ago\"    HX ORTHOPAEDIC      L5-S1 surgery (non fusion)          ALLERGIES     No Known Allergies       FAMILY HISTORY     Family History   Problem Relation Age of Onset    Cancer Mother     Diabetes Father     Hypertension Father     Dementia Father     Cancer Sister     negative for cardiac disease       SOCIAL HISTORY     Social History     Socioeconomic History    Marital status: LIFE PARTNER     Spouse name: Not on file    Number of children: 4    Years of education: 1 year of college    Highest education level: Not on file   Social Needs    Financial resource strain: Not hard at all   Sara-Evaristo insecurity - worry: Never true    Food insecurity - inability: Never true   Resilinc needs - medical: No    Transportation needs - non-medical: No   Occupational History    Occupation: Sales   Tobacco Use    Smoking status: Former Smoker    Smokeless tobacco: Never Used   Substance and Sexual Activity    Alcohol use: Yes     Alcohol/week: 1.2 - 1.8 oz     Types: 2 - 3 Cans of beer per week     Comment: beer/wine    Drug use: No    Sexual activity: Yes   Other Topics Concern     Service No    Blood Transfusions No    Caffeine Concern No    Occupational Exposure No    Hobby Hazards No    Sleep Concern No    Stress Concern No    Weight Concern No    Special Diet No    Back Care No    Exercise No    Bike Helmet No    Seat Belt No    Self-Exams No         MEDICATIONS     Current Outpatient Medications   Medication Sig    lisinopril (PRINIVIL, ZESTRIL) 5 mg tablet Take 1 Tab by mouth daily.  metoprolol succinate (TOPROL-XL) 50 mg XL tablet Take 1 Tab by mouth daily.  pantoprazole (PROTONIX) 40 mg tablet Take 1 Tab by mouth daily. Before Breakfast    apixaban (ELIQUIS) 5 mg tablet Take 1 Tab by mouth two (2) times a day.  acetaminophen (TYLENOL) 500 mg tablet Take 1 Tab by mouth every four (4) hours as needed.  tamsulosin (FLOMAX) 0.4 mg capsule Take 0.4 mg by mouth daily as needed (trouble urinating). No current facility-administered medications for this visit. I have reviewed the nurses notes, vitals, problem list, allergy list, medical history, family, social history and medications. REVIEW OF SYMPTOMS      General: Pt denies excessive weight gain or loss. Pt is able to conduct ADL's  HEENT: Denies blurred vision, headaches, hearing loss, epistaxis and difficulty swallowing.   Respiratory: Denies cough, congestion, shortness of breath, MATHEW, wheezing or stridor. Cardiovascular: Denies precordial pain, palpitations, edema or PND  Gastrointestinal: Denies poor appetite, indigestion, abdominal pain or blood in stool  Genitourinary: Denies hematuria, dysuria, increased urinary frequency  Musculoskeletal: Denies joint pain or swelling from muscles or joints  Neurologic: Denies tremor, paresthesias, headache, or sensory motor disturbance  Psychiatric: Denies confusion, insomnia, depression  Integumentray: Denies rash, itching or ulcers. Hematologic: Denies easy bruising, bleeding       PHYSICAL EXAMINATION      Vitals:    01/16/19 1042   BP: 128/76   Pulse: 76   Resp: 16   SpO2: 97%   Weight: 198 lb 6.4 oz (90 kg)   Height: 5' 11\" (1.803 m)     General: Well developed, in no acute distress. HEENT: No jaundice, oral mucosa moist, no oral ulcers  Neck: Supple, no stiffness, no lymphadenopathy, supple  Heart:  Normal S1/S2 negative S3 or S4. Regular, no murmur, gallop or rub, no jugular venous distention  Respiratory: Clear bilaterally x 4, no wheezing or rales  Abdomen:   Soft, non-tender, bowel sounds are active.   Extremities:  No edema, normal cap refill, no cyanosis. Musculoskeletal: No clubbing, no deformities  Neuro: A&Ox3, speech clear, gait stable, cooperative, no focal neurologic deficits  Skin: Skin color is normal. No rashes or lesions.  Non diaphoretic, moist.  Vascular: 2+ pulses symmetric in all extremities       DIAGNOSTIC DATA      EKG: Sinus rhythm       LABORATORY DATA      Lab Results   Component Value Date/Time    WBC 7.3 12/05/2018 03:51 AM    HGB 15.0 12/05/2018 03:51 AM    HCT 46.9 12/05/2018 03:51 AM    PLATELET 468 63/00/7527 03:51 AM    MCV 83.9 12/05/2018 03:51 AM      Lab Results   Component Value Date/Time    Sodium 134 (L) 12/05/2018 03:51 AM    Potassium 4.1 12/05/2018 03:51 AM    Chloride 101 12/05/2018 03:51 AM    CO2 24 12/05/2018 03:51 AM    Anion gap 9 12/05/2018 03:51 AM    Glucose 142 (H) 12/05/2018 03:51 AM    BUN 18 12/05/2018 03:51 AM    Creatinine 1.42 (H) 12/05/2018 03:51 AM    BUN/Creatinine ratio 13 12/05/2018 03:51 AM    GFR est AA >60 12/05/2018 03:51 AM    GFR est non-AA 50 (L) 12/05/2018 03:51 AM    Calcium 8.8 12/05/2018 03:51 AM    Bilirubin, total 1.1 (H) 11/25/2018 04:58 PM    AST (SGOT) 26 11/25/2018 04:58 PM    Alk. phosphatase 54 11/25/2018 04:58 PM    Protein, total 7.2 11/25/2018 04:58 PM    Albumin 3.7 11/25/2018 04:58 PM    Globulin 3.5 11/25/2018 04:58 PM    A-G Ratio 1.1 11/25/2018 04:58 PM    ALT (SGPT) 67 11/25/2018 04:58 PM           ASSESSMENT      1. Atrial fibrillation             A. Persistent              B. Failed cardioversion x2             C. PVI 12/2018  2. Atrial flutter              A. AFL ablation 12/2018  3. Chest pain  4. Cardiomyopathy  5. RAMIREZ  6.  Pericarditis         PLAN     Continue current drug therapy except discontinue amiodarone monitor recurrence of atrial fibrillation. The patient has an [address] [place] as well. FOLLOW-UP     2 months    Thank you, Geraldean Goltz, DO and Dr. Jani Pérez for allowing me to participate in the care of this extraordinarily pleasant male. Please do not hesitate to contact me for further questions/concerns.          Alena Atkins MD  Cardiac Electrophysiology / Cardiology    [address]  [address] [facility], [facility], [address] [place] [address]  [phone] / [phone] Fax   [phone] / [phone] Fax

## 2019-01-16 NOTE — PROGRESS NOTES
Room # 7    C/o occasional pain at left chest, going to cardiac rehab, diagnosed with RAMIREZ using CPAP    Visit Vitals  /76 (BP 1 Location: Left arm, BP Patient Position: Sitting)   Pulse 76   Resp 16   Ht 5' 11\" (1.803 m)   Wt 198 lb 6.4 oz (90 kg)   SpO2 97%   BMI 27.67 kg/m²

## 2019-01-16 NOTE — CARDIO/PULMONARY
Remberto Fontainejared III 
70 y.o. With diagnosis of  Heart Failure, a fib s/p ablation, attended phase II cardiac rehab for 9 visits between 12/13/18 and 1/10/10. Over the last 2 weeks, Mr. Barrett Alvarado has been a no call no show for his outpatient cardiac rehab appointments, with multiple phone calls to check on patient and follow up. Phone conversation occurred today. Patient states he has permission from his cardiologist to not attend outpatient cardiac rehab. Mr. Barrett Alvarado has been discharged. Florin Corona RN 
1/16/2019

## 2019-01-18 ENCOUNTER — APPOINTMENT (OUTPATIENT)
Dept: CARDIAC REHAB | Age: 64
End: 2019-01-18
Payer: COMMERCIAL

## 2019-01-21 ENCOUNTER — APPOINTMENT (OUTPATIENT)
Dept: CARDIAC REHAB | Age: 64
End: 2019-01-21
Payer: COMMERCIAL

## 2019-01-23 ENCOUNTER — APPOINTMENT (OUTPATIENT)
Dept: CARDIAC REHAB | Age: 64
End: 2019-01-23
Payer: COMMERCIAL

## 2019-01-25 ENCOUNTER — APPOINTMENT (OUTPATIENT)
Dept: CARDIAC REHAB | Age: 64
End: 2019-01-25
Payer: COMMERCIAL

## 2019-01-28 ENCOUNTER — APPOINTMENT (OUTPATIENT)
Dept: CARDIAC REHAB | Age: 64
End: 2019-01-28
Payer: COMMERCIAL

## 2019-01-30 ENCOUNTER — APPOINTMENT (OUTPATIENT)
Dept: CARDIAC REHAB | Age: 64
End: 2019-01-30
Payer: COMMERCIAL

## 2019-02-01 ENCOUNTER — APPOINTMENT (OUTPATIENT)
Dept: CARDIAC REHAB | Age: 64
End: 2019-02-01

## 2019-02-19 ENCOUNTER — TELEPHONE (OUTPATIENT)
Dept: FAMILY MEDICINE CLINIC | Age: 64
End: 2019-02-19

## 2019-02-19 NOTE — TELEPHONE ENCOUNTER
----- Message from Ning Samaniego sent at 2/19/2019  1:19 PM EST -----  Regarding: Dr. Robert Duenas is following up on a medication refill for Sialis. He states that [employer] has tried contacting the office with no response.  Phone: [phone]

## 2019-02-26 ENCOUNTER — OFFICE VISIT (OUTPATIENT)
Dept: FAMILY MEDICINE CLINIC | Age: 64
End: 2019-02-26

## 2019-02-26 VITALS
DIASTOLIC BLOOD PRESSURE: 78 MMHG | TEMPERATURE: 98.2 F | HEIGHT: 71 IN | BODY MASS INDEX: 28.14 KG/M2 | SYSTOLIC BLOOD PRESSURE: 119 MMHG | RESPIRATION RATE: 16 BRPM | OXYGEN SATURATION: 97 % | WEIGHT: 201 LBS | HEART RATE: 74 BPM

## 2019-02-26 DIAGNOSIS — R35.1 BENIGN PROSTATIC HYPERPLASIA WITH NOCTURIA: Primary | ICD-10-CM

## 2019-02-26 DIAGNOSIS — N40.1 BENIGN PROSTATIC HYPERPLASIA WITH NOCTURIA: Primary | ICD-10-CM

## 2019-02-26 RX ORDER — TADALAFIL 5 MG/1
5 TABLET ORAL
COMMUNITY
End: 2019-02-26 | Stop reason: SDUPTHER

## 2019-02-26 RX ORDER — TADALAFIL 5 MG/1
5 TABLET ORAL DAILY
Qty: 30 TAB | Refills: 11 | Status: SHIPPED | OUTPATIENT
Start: 2019-02-26 | End: 2020-03-17

## 2019-02-26 NOTE — PATIENT INSTRUCTIONS
A Healthy Lifestyle: Care Instructions Your Care Instructions A healthy lifestyle can help you feel good, stay at a healthy weight, and have plenty of energy for both work and play. A healthy lifestyle is something you can share with your whole family. A healthy lifestyle also can lower your risk for serious health problems, such as high blood pressure, heart disease, and diabetes. You can follow a few steps listed below to improve your health and the health of your family. Follow-up care is a key part of your treatment and safety. Be sure to make and go to all appointments, and call your doctor if you are having problems. It's also a good idea to know your test results and keep a list of the medicines you take. How can you care for yourself at home? · Do not eat too much sugar, fat, or fast foods. You can still have dessert and treats now and then. The goal is moderation. · Start small to improve your eating habits. Pay attention to portion sizes, drink less juice and soda pop, and eat more fruits and vegetables. ? Eat a healthy amount of food. A 3-ounce serving of meat, for example, is about the size of a deck of cards. Fill the rest of your plate with vegetables and whole grains. ? Limit the amount of soda and sports drinks you have every day. Drink more water when you are thirsty. ? Eat at least 5 servings of fruits and vegetables every day. It may seem like a lot, but it is not hard to reach this goal. A serving or helping is 1 piece of fruit, 1 cup of vegetables, or 2 cups of leafy, raw vegetables. Have an apple or some carrot sticks as an afternoon snack instead of a candy bar. Try to have fruits and/or vegetables at every meal. 
· Make exercise part of your daily routine. You may want to start with simple activities, such as walking, bicycling, or slow swimming. Try to be active 30 to 60 minutes every day.  You do not need to do all 30 to 60 minutes all at once. For example, you can exercise 3 times a day for 10 or 20 minutes. Moderate exercise is safe for most people, but it is always a good idea to talk to your doctor before starting an exercise program. 
· Keep moving. Rene Mass the lawn, work in the garden, or [employer]. Take the stairs instead of the elevator at work. · If you smoke, quit. People who smoke have an increased risk for heart attack, stroke, cancer, and other lung illnesses. Quitting is hard, but there are ways to boost your chance of quitting tobacco for good. ? Use nicotine gum, patches, or lozenges. ? Ask your doctor about stop-smoking programs and medicines. ? Keep trying. In addition to reducing your risk of diseases in the future, you will notice some benefits soon after you stop using tobacco. If you have shortness of breath or asthma symptoms, they will likely get better within a few weeks after you quit. · Limit how much alcohol you drink. Moderate amounts of alcohol (up to 2 drinks a day for men, 1 drink a day for women) are okay. But drinking too much can lead to liver problems, high blood pressure, and other health problems. Family health If you have a family, there are many things you can do together to improve your health. · Eat meals together as a family as often as possible. · Eat healthy foods. This includes fruits, vegetables, lean meats and dairy, and whole grains. · Include your family in your fitness plan. Most people think of activities such as jogging or tennis as the way to fitness, but there are many ways you and your family can be more active. Anything that makes you breathe hard and gets your heart pumping is exercise. Here are some tips: 
? Walk to do errands or to take your child to school or the bus. 
? Go for a family bike ride after dinner instead of watching TV. Where can you learn more? Go to http://james-dia.info/. Enter V224 in the search box to learn more about \"A Healthy Lifestyle: Care Instructions. \" Current as of: September 11, 2018 Content Version: 11.9 © 3746-4224 [employer] properties and services, Inc[employer]. Care instructions adapted under license by [employer] (which disclaims liability or warranty for this information). If you have questions about a medical condition or this instruction, always ask your healthcare professional. [place] [address] any warranty or liability for your use of this information.

## 2019-02-26 NOTE — PROGRESS NOTES
Chief Complaint Patient presents with  Medication Refill  
  cialis Patient presents in office today for med refill of Cialis. No concerns. 1. Have you been to the ER, urgent care clinic since your last visit? Hospitalized since your last visit? No 
 
2. Have you seen or consulted any other health care providers outside of the [address] [place] [address] since your last visit? Include any pap smears or colon screening. No 
 
Learning Assessment 12/13/2018 PRIMARY LEARNER Patient HIGHEST LEVEL OF EDUCATION - PRIMARY LEARNER  -  
BARRIERS PRIMARY LEARNER -  
CO-LEARNER CAREGIVER -  
PRIMARY LANGUAGE ENGLISH  
LEARNER PREFERENCE PRIMARY DEMONSTRATION  
ANSWERED BY patient RELATIONSHIP SELF

## 2019-02-26 NOTE — PROGRESS NOTES
Gayla Richey is a 70 y.o. male Chief Complaint Patient presents with  Medication Refill  
  cialis  
 pt here for refill of his cialis used prn for bph and pt is also on flomax as well. States sometimes he is fine and sometimes he has issues waking up multiple times a night to use bathroom. he is a 70y.o. year old male who presents for evalution. Reviewed PmHx, RxHx, FmHx, SocHx, AllgHx and updated and dated in the chart. Review of Systems - negative except as listed above in the HPI Objective:  
 
Vitals:  
 02/26/19 0820 BP: 119/78 Pulse: 74 Resp: 16 Temp: 98.2 °F (36.8 °C) TempSrc: Oral  
SpO2: 97% Weight: 201 lb (91.2 kg) Height: 5' 11\" (1.803 m) Current Outpatient Medications Medication Sig  tadalafil (CIALIS) 5 mg tablet Take 1 Tab by mouth daily.  lisinopril (PRINIVIL, ZESTRIL) 5 mg tablet Take 1 Tab by mouth daily.  metoprolol succinate (TOPROL-XL) 50 mg XL tablet Take 1 Tab by mouth daily.  pantoprazole (PROTONIX) 40 mg tablet Take 1 Tab by mouth daily. Before Breakfast  
 apixaban (ELIQUIS) 5 mg tablet Take 1 Tab by mouth two (2) times a day.  acetaminophen (TYLENOL) 500 mg tablet Take 1 Tab by mouth every four (4) hours as needed.  tamsulosin (FLOMAX) 0.4 mg capsule Take 0.4 mg by mouth daily as needed (trouble urinating). No current facility-administered medications for this visit. Physical Examination: General appearance - alert, well appearing, and in no distress Mental status - alert, oriented to person, place, and time Chest - clear to auscultation, no wheezes, rales or rhonchi, symmetric air entry Heart - normal rate, regular rhythm, normal S1, S2, no murmurs, rubs, clicks or gallops Assessment/ Plan:  
Diagnoses and all orders for this visit: 1. Benign prostatic hyperplasia with nocturia 
-     tadalafil (CIALIS) 5 mg tablet; Take 1 Tab by mouth daily. will likely need a PA Follow-up Disposition: Return if symptoms worsen or fail to improve. I have discussed the diagnosis with the patient and the intended plan as seen in the above orders. The patient has received an after-visit summary and questions were answered concerning future plans. Pt conveyed understanding of plan. Medication Side Effects and Warnings were discussed with patient [address] [facility] [address], DO

## 2019-02-27 RX ORDER — TAMSULOSIN HYDROCHLORIDE 0.4 MG/1
0.4 CAPSULE ORAL DAILY
Qty: 90 CAP | Refills: 3 | Status: SHIPPED | OUTPATIENT
Start: 2019-02-27 | End: 2020-05-26

## 2019-03-11 ENCOUNTER — TELEPHONE (OUTPATIENT)
Dept: FAMILY MEDICINE CLINIC | Age: 64
End: 2019-03-11

## 2019-03-11 NOTE — TELEPHONE ENCOUNTER
----- Message from [employer] sent at 3/11/2019 12:49 PM EDT -----  Regarding: Dr. Rico Linn, The Pt would like to follow-up insurance authorization forms faxed by [employer] on Thursday. Pt's best contact number  [phone].

## 2019-03-22 ENCOUNTER — OFFICE VISIT (OUTPATIENT)
Dept: CARDIOLOGY CLINIC | Age: 64
End: 2019-03-22

## 2019-03-22 VITALS
HEART RATE: 76 BPM | WEIGHT: 201.6 LBS | HEIGHT: 71 IN | RESPIRATION RATE: 16 BRPM | BODY MASS INDEX: 28.22 KG/M2 | SYSTOLIC BLOOD PRESSURE: 106 MMHG | DIASTOLIC BLOOD PRESSURE: 60 MMHG | OXYGEN SATURATION: 97 %

## 2019-03-22 DIAGNOSIS — Z98.890 STATUS POST ABLATION OF ATRIAL FIBRILLATION: ICD-10-CM

## 2019-03-22 DIAGNOSIS — G47.33 OSA (OBSTRUCTIVE SLEEP APNEA): ICD-10-CM

## 2019-03-22 DIAGNOSIS — I48.91 ATRIAL FIBRILLATION, UNSPECIFIED TYPE (HCC): Primary | ICD-10-CM

## 2019-03-22 DIAGNOSIS — Z86.79 STATUS POST ABLATION OF ATRIAL FIBRILLATION: ICD-10-CM

## 2019-03-22 DIAGNOSIS — I42.8 NICM (NONISCHEMIC CARDIOMYOPATHY) (HCC): ICD-10-CM

## 2019-03-22 NOTE — PROGRESS NOTES
Room # 6 Occasional chest pain, occasional dizziness(vertigo), Fatigued last week Visit Vitals /60 (BP 1 Location: Left arm, BP Patient Position: Sitting) Pulse 76 Resp 16 Ht 5' 11\" (1.803 m) Wt 201 lb 9.6 oz (91.4 kg) SpO2 97% BMI 28.12 kg/m²

## 2019-03-22 NOTE — PROGRESS NOTES
HISTORY OF PRESENTING ILLNESS Bobby Greenwood is a 70 y.o. male with persistent atrial fibrillation/atrial flutter who underwent AF and atrial flutter ablation now presenting for follow-up. His EF in 11/2018 was 20%. Cardiac catheterization at that time was negative for CAD. He discontinued his colchicine for pericarditis recently. 30-day monitor demonstrates sinus rhythm throughout. He remains on his Eliquis. Amiodarone was discontinued and he denies recurrence of AF. He occasionally has chest pains and fatigue. EKG today shows NSR. ACTIVE PROBLEM LIST Patient Active Problem List  
 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  
Diagnosis Date  A-fib ([address]  Anxiety  Atrial flutter ([address]  BPH  Depression  ED (erectile dysfunction)  Hearing loss  Heart failure ([address]   
 11/25/18 with afib RVR  Herniated disc  Hypogonadism  RAMIREZ on CPAP 2019  Sleep apnea PAST SURGICAL HISTORY Past Surgical History:  
Procedure Laterality Date  ECHO STRESS    
 HX HERNIA REPAIR    
 hernia repair \"20 years ago\"  HX ORTHOPAEDIC L5-S1 surgery (non fusion) ALLERGIES No Known Allergies FAMILY HISTORY Family History Problem Relation Age of Onset  Cancer Mother  Diabetes Father  Hypertension Father  Dementia Father  Cancer Sister   
 negative for cardiac disease SOCIAL HISTORY Social History Socioeconomic History  Marital status: LIFE PARTNER Spouse name: Not on file  Number of children: 4  
 Years of education: 1 year of college  Highest education level: Not on file Occupational History  Occupation: Sales Social Needs  Financial resource strain: Not hard at all  Food insecurity:  
  Worry: Never true Inability: Never true  Transportation needs:  
  Medical: No  
  Non-medical: No  
Tobacco Use  Smoking status: Former Smoker  Smokeless tobacco: Never Used Substance and Sexual Activity  Alcohol use: Yes Alcohol/week: 1.2 - 1.8 oz Types: 2 - 3 Cans of beer per week Comment: beer/wine  Drug use: No  
 Sexual activity: Yes Lifestyle  Physical activity:  
  Days per week: 0 days Minutes per session: 0 min  Stress: Not at all Relationships  Social connections:  
  Talks on phone: Twice a week Gets together: Once a week Attends Bahai service: 1 to 4 times per year Active member of club or organization: Yes Attends meetings of clubs or organizations: Not on file Relationship status: Living with partner Other Topics Concern   Service No  
 Blood Transfusions No  
 Caffeine Concern No  
 Occupational Exposure No  
 Hobby Hazards No  
 Sleep Concern No  
 Stress Concern No  
 Weight Concern No  
 Special Diet No  
 Back Care No  
 Exercise No  
 Bike Helmet No  
 Seat Belt No  
 Self-Exams No  
 
 
 
MEDICATIONS Current Outpatient Medications Medication Sig  tamsulosin (FLOMAX) 0.4 mg capsule Take 1 Cap by mouth daily. (Patient taking differently: Take 0.4 mg by mouth daily as needed.)  tadalafil (CIALIS) 5 mg tablet Take 1 Tab by mouth daily. (Patient taking differently: Take 5 mg by mouth daily as needed.)  lisinopril (PRINIVIL, ZESTRIL) 5 mg tablet Take 1 Tab by mouth daily.  metoprolol succinate (TOPROL-XL) 50 mg XL tablet Take 1 Tab by mouth daily.  apixaban (ELIQUIS) 5 mg tablet Take 1 Tab by mouth two (2) times a day.  acetaminophen (TYLENOL) 500 mg tablet Take 1 Tab by mouth every four (4) hours as needed.  pantoprazole (PROTONIX) 40 mg tablet Take 1 Tab by mouth daily. Before Breakfast  
 
No current facility-administered medications for this visit. I have reviewed the nurses notes, vitals, problem list, allergy list, medical history, family, social history and medications. REVIEW OF SYMPTOMS General:+fatigue,  Pt denies excessive weight gain or loss. Pt is able to conduct ADL's HEENT: Denies blurred vision, headaches, hearing loss, epistaxis and difficulty swallowing. Respiratory: Denies cough, congestion, shortness of breath, MATHEW, wheezing or stridor. Cardiovascular: +chest pain, Denies precordial pain, palpitations, edema or PND Gastrointestinal: Denies poor appetite, indigestion, abdominal pain or blood in stool Genitourinary: Denies hematuria, dysuria, increased urinary frequency Musculoskeletal: Denies joint pain or swelling from muscles or joints Neurologic: Denies tremor, paresthesias, headache, or sensory motor disturbance Psychiatric: Denies confusion, insomnia, depression Integumentray: Denies rash, itching or ulcers. Hematologic: Denies easy bruising, bleeding PHYSICAL EXAMINATION Vitals:  
 03/22/19 1408 BP: 106/60 Pulse: 76 Resp: 16 SpO2: 97% Weight: 201 lb 9.6 oz (91.4 kg) Height: 5' 11\" (1.803 m) General: Well developed, in no acute distress. HEENT: No jaundice, oral mucosa moist, no oral ulcers Neck: Supple, no stiffness, no lymphadenopathy, supple Heart:  Normal S1/S2 negative S3 or S4. Regular, no murmur, gallop or rub, no jugular venous distention Respiratory: Clear bilaterally x 4, no wheezing or rales Abdomen:   Soft, non-tender, bowel sounds are active.  
 Extremities:  No edema, normal cap refill, no cyanosis. Musculoskeletal: No clubbing, no deformities Neuro: A&Ox3, speech clear, gait stable, cooperative, no focal neurologic deficits Skin: Skin color is normal. No rashes or lesions. Non diaphoretic, moist. 
Vascular: 2+ pulses symmetric in all extremities DIAGNOSTIC DATA EKG: sinus rhythm LABORATORY DATA Lab Results Component Value Date/Time WBC 7.3 12/05/2018 03:51 AM  
 HGB 15.0 12/05/2018 03:51 AM  
 HCT 46.9 12/05/2018 03:51 AM  
 PLATELET 492 22/72/3145 03:51 AM  
 MCV 83.9 12/05/2018 03:51 AM  
  
Lab Results Component Value Date/Time Sodium 134 (L) 12/05/2018 03:51 AM  
 Potassium 4.1 12/05/2018 03:51 AM  
 Chloride 101 12/05/2018 03:51 AM  
 CO2 24 12/05/2018 03:51 AM  
 Anion gap 9 12/05/2018 03:51 AM  
 Glucose 142 (H) 12/05/2018 03:51 AM  
 BUN 18 12/05/2018 03:51 AM  
 Creatinine 1.42 (H) 12/05/2018 03:51 AM  
 BUN/Creatinine ratio 13 12/05/2018 03:51 AM  
 GFR est AA >60 12/05/2018 03:51 AM  
 GFR est non-AA 50 (L) 12/05/2018 03:51 AM  
 Calcium 8.8 12/05/2018 03:51 AM  
 Bilirubin, total 1.1 (H) 11/25/2018 04:58 PM  
 AST (SGOT) 26 11/25/2018 04:58 PM  
 Alk. phosphatase 54 11/25/2018 04:58 PM  
 Protein, total 7.2 11/25/2018 04:58 PM  
 Albumin 3.7 11/25/2018 04:58 PM  
 Globulin 3.5 11/25/2018 04:58 PM  
 A-G Ratio 1.1 11/25/2018 04:58 PM  
 ALT (SGPT) 67 11/25/2018 04:58 PM  
  
 
 
 ASSESSMENT 1. Atrial fibrillation 
           A. Persistent  
           B. Failed cardioversion x2 C. PVI 12/2018 2. Atrial flutter A. AFL ablation 12/2018 3. Chest pain 4. Cardiomyopathy 5. RAMIREZ 6.  Pericarditis PLAN  
 
Obtain echocardiogram to re-evaluate ejection fraction to determine whether ICD therapy is warranted. Will consider de escalation of medical therapy based off of results. FOLLOW-UP After testing to determine whether ICD is warranted. Thank you, Mir Barry DO and Dr. Jamie Sanchez for allowing me to participate in the care of this extraordinarily pleasant male. Please do not hesitate to contact me for further questions/concerns. Awais Galarza NP Patient seen and examined by me with nurse practitioner. I personally performed all components of the history, physical, and medical decision making and agree with the assessment and plan with minor modifications as noted. Jaren Arias MD 
Cardiac Electrophysiology / Cardiology [address] [place] [address], [address] [place] [address] 
[phone] / [phone] Fax   [phone] / [phone] Fax

## 2019-04-17 ENCOUNTER — OFFICE VISIT (OUTPATIENT)
Dept: CARDIOLOGY CLINIC | Age: 64
End: 2019-04-17

## 2019-04-17 VITALS
HEIGHT: 71 IN | WEIGHT: 201 LBS | DIASTOLIC BLOOD PRESSURE: 62 MMHG | OXYGEN SATURATION: 98 % | HEART RATE: 96 BPM | RESPIRATION RATE: 16 BRPM | SYSTOLIC BLOOD PRESSURE: 118 MMHG | BODY MASS INDEX: 28.14 KG/M2

## 2019-04-17 DIAGNOSIS — Z86.79 STATUS POST ABLATION OF ATRIAL FIBRILLATION: ICD-10-CM

## 2019-04-17 DIAGNOSIS — G47.33 OSA (OBSTRUCTIVE SLEEP APNEA): ICD-10-CM

## 2019-04-17 DIAGNOSIS — I42.8 NICM (NONISCHEMIC CARDIOMYOPATHY) (HCC): ICD-10-CM

## 2019-04-17 DIAGNOSIS — I48.91 ATRIAL FIBRILLATION, UNSPECIFIED TYPE (HCC): Primary | ICD-10-CM

## 2019-04-17 DIAGNOSIS — Z98.890 STATUS POST ABLATION OF ATRIAL FIBRILLATION: ICD-10-CM

## 2019-04-17 RX ORDER — METOPROLOL SUCCINATE 25 MG/1
25 TABLET, EXTENDED RELEASE ORAL DAILY
Qty: 90 TAB | Refills: 3 | Status: SHIPPED | OUTPATIENT
Start: 2019-04-17 | End: 2019-04-23 | Stop reason: SDUPTHER

## 2019-04-17 NOTE — PROGRESS NOTES
Chief Complaint   Patient presents with    Irregular Heart Beat     AFL    Other     CM     Visit Vitals  /62 (BP 1 Location: Left arm, BP Patient Position: Sitting)   Pulse 96   Resp 16   Ht 5' 11\" (1.803 m)   Wt 201 lb (91.2 kg)   SpO2 98%   BMI 28.03 kg/m²     Chest pain \"all the time; after ablation, it's healing\"  SOB denied  Dizziness \"from time to time over last couple of years with vertigo\" better than it used to be; \"haven't been working out like I used to. \"  Swelling denied  Recent hospital visit denied; Dec 2018  Refills denied    \"Would like to get off all medicines\"  Workout clearance/recommendations  Echo 4/10

## 2019-04-17 NOTE — PROGRESS NOTES
HISTORY OF PRESENTING ILLNESS      Sandra Gustafson III is a 70 y.o. male with persistent atrial fibrillation/atrial flutter who underwent AF and atrial flutter ablation now presenting for follow-up. His EF in 11/2018 was 20%. Cardiac catheterization at that time was negative for CAD.  He discontinued his colchicine for pericarditis recently.  30-day monitor demonstrates sinus rhythm throughout. Dean Edwards remains on his Eliquis. Amiodarone was discontinued and he denies recurrence of AF. He occasionally has chest pains and fatigue. EKG today shows NSR. Most recent echocardiogram demonstrated improvement in his EF to 45%.  He still has fatigue and would like to have the energy to return to his workout program.     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    Atrial fibrillation with RVR ([address] 11/25/2018    Memory loss 09/04/2014    Lumbar radiculitis 07/18/2013    Hypogonadism male 04/14/2011    Hyperlipemia 02/04/2011    Pre-diabetes 01/06/2011    Sleep apnea 12/21/2010    RAMIREZ (obstructive sleep apnea) 05/17/2010    ED (erectile dysfunction) 05/17/2010    Hypogonadism 05/17/2010    Seasonal allergic reaction 05/17/2010    Depression with anxiety 05/17/2010           PAST MEDICAL HISTORY     Past Medical History:   Diagnosis Date    [facility]-fib [facility])     Anxiety     Atrial flutter ([address]     BPH     Depression     ED (erectile dysfunction)     Hearing loss     Heart failure ([address]     11/25/18 with afib RVR    Herniated disc     Hypogonadism     RAMIREZ on CPAP 2019    Sleep apnea            PAST SURGICAL HISTORY     Past Surgical History:   Procedure Laterality Date    ECHO STRESS      HX HERNIA REPAIR      hernia repair \"20 years ago\"    HX ORTHOPAEDIC      L5-S1 surgery (non fusion)          ALLERGIES     No Known Allergies       FAMILY HISTORY     Family History   Problem Relation Age of Onset    Cancer Mother     Diabetes Father     Hypertension Father     Dementia Father     Cancer Sister     negative for cardiac disease       SOCIAL HISTORY     Social History     Socioeconomic History    Marital status: LIFE PARTNER     Spouse name: Not on file    Number of children: 3    Years of education: 1 year of college    Highest education level: Not on file   Occupational History    Occupation: Sales   Social Needs    Financial resource strain: Not hard at all   [employer] insecurity:     Worry: Never true     Inability: Never true   [employer] needs:     Medical: No     Non-medical: No   Tobacco Use    Smoking status: Former Smoker    Smokeless tobacco: Never Used   Substance and Sexual Activity    Alcohol use: Yes     Alcohol/week: 1.2 - 1.8 oz     Types: 2 - 3 Cans of beer per week     Comment: beer/wine    Drug use: No    Sexual activity: Yes   Lifestyle    Physical activity:     Days per week: 0 days     Minutes per session: 0 min    Stress: Not at all   Relationships    Social connections:     Talks on phone: Twice a week     Gets together: Once a week     Attends Yazidi service: 1 to 4 times per year     Active member of club or organization: Yes     Attends meetings of clubs or organizations: Not on file     Relationship status: Living with partner   Other Topics Concern     Service No    Blood Transfusions No    Caffeine Concern No    Occupational Exposure No    Hobby Hazards No    Sleep Concern No    Stress Concern No    Weight Concern No    Special Diet No    Back Care No    Exercise No    Bike Helmet No    Seat Belt No    Self-Exams No         MEDICATIONS     Current Outpatient Medications   Medication Sig    tamsulosin (FLOMAX) 0.4 mg capsule Take 1 Cap by mouth daily. (Patient taking differently: Take 0.4 mg by mouth daily as needed.)    tadalafil (CIALIS) 5 mg tablet Take 1 Tab by mouth daily.  (Patient taking differently: Take 5 mg by mouth daily as needed.)    lisinopril (PRINIVIL, ZESTRIL) 5 mg tablet Take 1 Tab by mouth daily.  metoprolol succinate (TOPROL-XL) 50 mg XL tablet Take 1 Tab by mouth daily.  apixaban (ELIQUIS) 5 mg tablet Take 1 Tab by mouth two (2) times a day.  acetaminophen (TYLENOL) 500 mg tablet Take 1 Tab by mouth every four (4) hours as needed. No current facility-administered medications for this visit. I have reviewed the nurses notes, vitals, problem list, allergy list, medical history, family, social history and medications. REVIEW OF SYMPTOMS      General: Pt denies excessive weight gain or loss. Pt is able to conduct ADL's  HEENT: Denies blurred vision, headaches, hearing loss, epistaxis and difficulty swallowing. Respiratory: Denies cough, congestion, shortness of breath, MATHEW, wheezing or stridor. Cardiovascular: Denies precordial pain, palpitations, edema or PND  Gastrointestinal: Denies poor appetite, indigestion, abdominal pain or blood in stool  Genitourinary: Denies hematuria, dysuria, increased urinary frequency  Musculoskeletal: Denies joint pain or swelling from muscles or joints  Neurologic: Denies tremor, paresthesias, headache, or sensory motor disturbance  Psychiatric: Denies confusion, insomnia, depression  Integumentray: Denies rash, itching or ulcers. Hematologic: Denies easy bruising, bleeding       PHYSICAL EXAMINATION      There were no vitals filed for this visit. General: Well developed, in no acute distress. HEENT: No jaundice, oral mucosa moist, no oral ulcers  Neck: Supple, no stiffness, no lymphadenopathy, supple  Heart:  Normal S1/S2 negative S3 or S4. Regular, no murmur, gallop or rub, no jugular venous distention  Respiratory: Clear bilaterally x 4, no wheezing or rales  Abdomen:   Soft, non-tender, bowel sounds are active.   Extremities:  No edema, normal cap refill, no cyanosis.   Musculoskeletal: No clubbing, no deformities  Neuro: A&Ox3, speech clear, gait stable, cooperative, no focal neurologic deficits  Skin: Skin color is normal. No rashes or lesions. Non diaphoretic, moist.  Vascular: 2+ pulses symmetric in all extremities       DIAGNOSTIC DATA      EKG:        LABORATORY DATA      Lab Results   Component Value Date/Time    WBC 7.3 12/05/2018 03:51 AM    HGB 15.0 12/05/2018 03:51 AM    HCT 46.9 12/05/2018 03:51 AM    PLATELET 838 54/02/9475 03:51 AM    MCV 83.9 12/05/2018 03:51 AM      Lab Results   Component Value Date/Time    Sodium 134 (L) 12/05/2018 03:51 AM    Potassium 4.1 12/05/2018 03:51 AM    Chloride 101 12/05/2018 03:51 AM    CO2 24 12/05/2018 03:51 AM    Anion gap 9 12/05/2018 03:51 AM    Glucose 142 (H) 12/05/2018 03:51 AM    BUN 18 12/05/2018 03:51 AM    Creatinine 1.42 (H) 12/05/2018 03:51 AM    BUN/Creatinine ratio 13 12/05/2018 03:51 AM    GFR est AA >60 12/05/2018 03:51 AM    GFR est non-AA 50 (L) 12/05/2018 03:51 AM    Calcium 8.8 12/05/2018 03:51 AM    Bilirubin, total 1.1 (H) 11/25/2018 04:58 PM    AST (SGOT) 26 11/25/2018 04:58 PM    Alk. phosphatase 54 11/25/2018 04:58 PM    Protein, total 7.2 11/25/2018 04:58 PM    Albumin 3.7 11/25/2018 04:58 PM    Globulin 3.5 11/25/2018 04:58 PM    A-G Ratio 1.1 11/25/2018 04:58 PM    ALT (SGPT) 67 11/25/2018 04:58 PM           ASSESSMENT      1. Atrial fibrillation             A.  Persistent              B. Failed cardioversion x2             S. PVI 12/2018  2. Atrial flutter                         A. AFL ablation 12/2018  3. Chest pain  4. Cardiomyopathy  5. RAMIREZ  6.  Pericarditis       PLAN     Continue to monitor clinically for recurrence of AF. Reduce metoprolol to 25mg daily and monitor for improvement in fatigue. Will have him establish with Dr. Hank Stewart to follow cardiomyopathy. FOLLOW-UP   6 months    Thank you, Nayana Arguello DO and Dr. Hank Stewart for allowing me to participate in the care of this extraordinarily pleasant male. Please do not hesitate to contact me for further questions/concerns. Peng Loosen, NP

## 2019-04-23 RX ORDER — METOPROLOL SUCCINATE 25 MG/1
25 TABLET, EXTENDED RELEASE ORAL DAILY
Qty: 90 TAB | Refills: 1 | Status: SHIPPED | OUTPATIENT
Start: 2019-04-23 | End: 2020-02-25

## 2019-04-23 NOTE — TELEPHONE ENCOUNTER
Requested Prescriptions     Signed Prescriptions Disp Refills    metoprolol succinate (TOPROL-XL) 25 mg XL tablet 90 Tab 1     Sig: Take 1 Tab by mouth daily. Authorizing Provider: Og Galo     Ordering User: Gloria Dates     Refill per verbal order Dr. Chava Bo.

## 2019-05-30 RX ORDER — LISINOPRIL 5 MG/1
5 TABLET ORAL DAILY
Qty: 90 TAB | Refills: 2 | Status: SHIPPED | OUTPATIENT
Start: 2019-05-30 | End: 2020-02-25

## 2019-05-30 NOTE — TELEPHONE ENCOUNTER
Requested Prescriptions     Signed Prescriptions Disp Refills    lisinopril (PRINIVIL, ZESTRIL) 5 mg tablet 90 Tab 2     Sig: Take 1 Tab by mouth daily. Authorizing Provider: Jaynie Schaumann     Ordering User: Jet Denny     Refill per verbal order Dr. Mimi Marie.

## 2019-08-16 DIAGNOSIS — I48.91 ATRIAL FIBRILLATION, UNSPECIFIED TYPE (HCC): ICD-10-CM

## 2019-08-16 NOTE — TELEPHONE ENCOUNTER
Requested Prescriptions     Signed Prescriptions Disp Refills    apixaban (ELIQUIS) 5 mg tablet 60 Tab 6     Sig: Take 1 Tab by mouth two (2) times a day. Authorizing Provider: Vel Crain     Ordering User: Chago Dutton     Refill per verbal order Dr. Cary Romero.

## 2019-08-26 ENCOUNTER — OFFICE VISIT (OUTPATIENT)
Dept: FAMILY MEDICINE CLINIC | Age: 64
End: 2019-08-26

## 2019-08-26 VITALS
BODY MASS INDEX: 28.56 KG/M2 | HEART RATE: 75 BPM | OXYGEN SATURATION: 98 % | WEIGHT: 204 LBS | TEMPERATURE: 97.6 F | RESPIRATION RATE: 18 BRPM | SYSTOLIC BLOOD PRESSURE: 99 MMHG | DIASTOLIC BLOOD PRESSURE: 67 MMHG | HEIGHT: 71 IN

## 2019-08-26 DIAGNOSIS — I48.91 ATRIAL FIBRILLATION, UNSPECIFIED TYPE (HCC): ICD-10-CM

## 2019-08-26 DIAGNOSIS — J06.9 ACUTE URI: Primary | ICD-10-CM

## 2019-08-26 RX ORDER — AZITHROMYCIN 250 MG/1
TABLET, FILM COATED ORAL
Qty: 6 TAB | Refills: 0 | Status: SHIPPED | OUTPATIENT
Start: 2019-08-26 | End: 2019-08-31

## 2019-08-26 NOTE — PROGRESS NOTES
Chief Complaint   Patient presents with    Nasal Congestion     Patient presents during walk in clinic with sx that began last wk. Pt states secretions are yellow. Have not treated with otc. Denies any sick contacts. Denies any fever. Sx started last week, sinus congestion and pressure. Denies any relief with OTC medication. Denies any recent abx. BP a little low this morning. Took his medication this morning. Cardiologist is Dr. Macky Phoenix. Pt was hospitalized in November in [facility]. Pt had a cardiac ablation. Denies any other concerns at this time. Chief Complaint   Patient presents with    Nasal Congestion     he is a 70y.o. year old male who presents for evalution. Reviewed PmHx, RxHx, FmHx, SocHx, AllgHx and updated and dated in the chart. Review of Systems - negative except as listed above in the HPI    Objective:     Vitals:    08/26/19 0743   BP: 99/67   Pulse: 75   Resp: 18   Temp: 97.6 °F (36.4 °C)   TempSrc: Oral   SpO2: 98%   Weight: 204 lb (92.5 kg)   Height: 5' 11\" (1.803 m)     Physical Examination: General appearance - alert, well appearing, and in no distress  Eyes - pupils equal and reactive, extraocular eye movements intact  Ears - bilateral TM's and external ear canals normal  Nose - mucosal congestion, mucosal erythema, purulent rhinorrhea and sinus tenderness noted   Mouth - mucous membranes moist, pharynx normal without lesions  Neck - supple, no significant adenopathy  Chest - clear to auscultation, no wheezes, rales or rhonchi, symmetric air entry  Heart - normal rate, regular rhythm, normal S1, S2    Assessment/ Plan:   Diagnoses and all orders for this visit:    1. Acute URI  -     azithromycin (ZITHROMAX) 250 mg tablet; Take 2 tablets today, then take 1 tablet daily  Start and complete full course of zithromax. Dwp ADRs/SEs of medication. Push fluids. Rest. Saline nasal spray for nasal congestion. OTC motrin/apap for fevers. RTC if sx persist or worsen. I have discussed the diagnosis with the patient and the intended plan as seen in the above orders. The patient has received an after-visit summary and questions were answered concerning future plans. Medication Side Effects and Warnings were discussed with patient: yes  Patient Labs were reviewed and or requested: no  Patient Past Records were reviewed and or requested  yes  Patient / Caregiver Understanding of treatment plan was verbalized during office visit YES    VALERIA Dukes    There are no Patient Instructions on file for this visit.

## 2019-08-26 NOTE — TELEPHONE ENCOUNTER
Requested Prescriptions     Signed Prescriptions Disp Refills    apixaban (ELIQUIS) 5 mg tablet 180 Tab 2     Sig: Take 1 Tab by mouth two (2) times a day. Authorizing Provider: Yared Quevedo     Ordering User: Tran Other     Refill per verbal order Dr. Renetta Joshua.

## 2019-08-26 NOTE — PROGRESS NOTES
Chief Complaint   Patient presents with    Nasal Congestion     Patient presents during walk in clinic with sx that began last wk. Pt states secretions are yellow. Have not treated with otc. 1. Have you been to the ER, urgent care clinic since your last visit? Hospitalized since your last visit? No    2. Have you seen or consulted any other health care providers outside of the [address] [place] [address] since your last visit? Include any pap smears or colon screening.  No

## 2019-10-30 ENCOUNTER — OFFICE VISIT (OUTPATIENT)
Dept: CARDIOLOGY CLINIC | Age: 64
End: 2019-10-30

## 2019-10-30 VITALS
BODY MASS INDEX: 29.26 KG/M2 | HEIGHT: 71 IN | DIASTOLIC BLOOD PRESSURE: 80 MMHG | RESPIRATION RATE: 18 BRPM | OXYGEN SATURATION: 98 % | WEIGHT: 209 LBS | SYSTOLIC BLOOD PRESSURE: 130 MMHG | HEART RATE: 73 BPM

## 2019-10-30 DIAGNOSIS — I48.91 ATRIAL FIBRILLATION, UNSPECIFIED TYPE (HCC): ICD-10-CM

## 2019-10-30 DIAGNOSIS — Z98.890 STATUS POST ABLATION OF ATRIAL FIBRILLATION: ICD-10-CM

## 2019-10-30 DIAGNOSIS — Z86.79 STATUS POST ABLATION OF ATRIAL FIBRILLATION: ICD-10-CM

## 2019-10-30 DIAGNOSIS — I42.8 NICM (NONISCHEMIC CARDIOMYOPATHY) (HCC): ICD-10-CM

## 2019-10-30 DIAGNOSIS — I48.91 ATRIAL FIBRILLATION WITH RVR (HCC): Primary | ICD-10-CM

## 2019-10-30 DIAGNOSIS — I49.3 PVC (PREMATURE VENTRICULAR CONTRACTION): ICD-10-CM

## 2019-10-30 DIAGNOSIS — G47.33 OSA (OBSTRUCTIVE SLEEP APNEA): ICD-10-CM

## 2019-10-30 NOTE — PATIENT INSTRUCTIONS
Premature Heartbeat: Care Instructions Your Care Instructions A premature heartbeat happens when the heart beats earlier than it should. This briefly interrupts the heart's rhythm. You do not usually feel the early heartbeat, and the next beat is stronger. To many people, this feels like a skipped heartbeat or a flutter. This heartbeat is also called a premature ventricular contraction (PVC). If you have no heart problems, premature heartbeats that happen once in a while are not a cause for concern. Most people have them at some time. They may happen more often if you drink a lot of caffeine or alcohol or are under stress. Usually, no cause for a premature heartbeat is found, and no treatment is needed. Some people may take medicine to prevent these heartbeats and to relieve symptoms. Follow-up care is a key part of your treatment and safety. Be sure to make and go to all appointments, and call your doctor if you are having problems. It's also a good idea to know your test results and keep a list of the medicines you take. How can you care for yourself at home? · Limit caffeine and other stimulants if they trigger premature heartbeats. · Reduce stress. Avoid people and places that make you feel anxious, if you can. Learn ways to reduce stress, such as biofeedback, guided imagery, and meditation. · Do not smoke or allow others to smoke around you. If you need help quitting, talk to your doctor about stop-smoking programs and medicines. These can increase your chances of quitting for good. · Get at least 30 minutes of exercise on most days of the week. Walking is a good choice. You also may want to do other activities, such as running, swimming, cycling, or playing tennis or team sports. · Eat heart-healthy foods. · Stay at a healthy weight. Lose weight if you need to. · Get enough sleep. Keep your room dark and quiet, and try to go to bed at the same time every night. · Limit alcohol to 2 drinks a day for men and 1 drink a day for women. Too much alcohol can cause health problems. If drinking alcohol causes more premature heartbeats, do not drink it. · If your doctor prescribes medicine, take it exactly as prescribed. Call your doctor if you think you are having a problem with your medicine. When should you call for help? Call 911 anytime you think you may need emergency care. For example, call if: 
  · You passed out (lost consciousness).  
 Call your doctor now or seek immediate medical care if: 
  · You are dizzy or lightheaded, or you feel like you may faint.  
  · You are short of breath.  
 Watch closely for changes in your health, and be sure to contact your doctor if you have any problems. Where can you learn more? Go to http://james-dia.info/. Enter M692 in the search box to learn more about \"Premature Heartbeat: Care Instructions. \" Current as of: April 9, 2019 Content Version: 12.2 © 5969-1504 [employer]. Care instructions adapted under license by [employer] (which disclaims liability or warranty for this information). If you have questions about a medical condition or this instruction, always ask your healthcare professional. [address] any warranty or liability for your use of this information.

## 2019-10-30 NOTE — PROGRESS NOTES
Room # 4  Complains of fatigue. Thinks it may be due to metoprolol  Would like to get back in the gym. PVC captured during EKG was expressed as \"chest pain\" per patient.     Visit Vitals  /80 (BP 1 Location: Left arm, BP Patient Position: Sitting)   Pulse 73   Resp 18   Ht 5' 11\" (1.803 m)   Wt 209 lb (94.8 kg)   SpO2 98%   BMI 29.15 kg/m²

## 2019-10-30 NOTE — PROGRESS NOTES
HISTORY OF PRESENTING ILLNESS      Keara Roman III is a 70 y.o. male with persistent atrial fibrillation/atrial flutter who underwent AF and atrial flutter ablation now presenting for follow-up. His EF in 11/2018 was 20%. Cardiac catheterization at that time was negative for CAD.  He discontinued his colchicine for pericarditis recently.  30-day monitor demonstrated sinus rhythm throughout. [facility] remains on his Eliquis. Post ablation his EF improved to 40-45%. He remains fatigued on the beta blocker. He experienced a palpitation during his EKG today which demonstrated a single PVC.         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)     Anxiety     Atrial flutter ([address]     BPH     Depression     ED (erectile dysfunction)     Hearing loss     Heart failure ([address]     11/25/18 with afib RVR    Herniated disc     Hypogonadism     RAMIREZ on CPAP 2019    Sleep apnea            PAST SURGICAL HISTORY     Past Surgical History:   Procedure Laterality Date    ECHO STRESS      HX HERNIA REPAIR      hernia repair \"20 years ago\"    HX ORTHOPAEDIC      L5-S1 surgery (non fusion)          ALLERGIES     No Known Allergies       FAMILY HISTORY     Family History   Problem Relation Age of Onset    Cancer Mother     Diabetes Father     Hypertension Father     Dementia Father     Cancer Sister negative for cardiac disease       SOCIAL HISTORY     Social History     Socioeconomic History    Marital status: LIFE PARTNER     Spouse name: Not on file    Number of children: 4    Years of education: 1 year of college    Highest education level: Not on file   Occupational History    Occupation: Sales   Social Needs    Financial resource strain: Not hard at all   [place] insecurity:     Worry: Never true     Inability: Never true   [employer] needs:     Medical: No     Non-medical: No   Tobacco Use    Smoking status: Former Smoker    Smokeless tobacco: Never Used   Substance and Sexual Activity    Alcohol use: Yes     Alcohol/week: 2.0 - 3.0 standard drinks     Types: 2 - 3 Cans of beer per week     Comment: beer/wine    Drug use: No    Sexual activity: Yes   Lifestyle    Physical activity:     Days per week: 0 days     Minutes per session: 0 min    Stress: Not at all   Relationships    Social connections:     Talks on phone: Twice a week     Gets together: Once a week     Attends [employer] service: 1 to 4 times per year     Active member of club or organization: Yes     Attends meetings of clubs or organizations: Not on file     Relationship status: Living with partner   Other Topics Concern     Service No    Blood Transfusions No    Caffeine Concern No    Occupational Exposure No    Hobby Hazards No    Sleep Concern No    Stress Concern No    Weight Concern No    Special Diet No    Back Care No    Exercise No    Bike Helmet No    Seat Belt No    Self-Exams No         MEDICATIONS     Current Outpatient Medications   Medication Sig    apixaban (ELIQUIS) 5 mg tablet Take 1 Tab by mouth two (2) times a day.  lisinopril (PRINIVIL, ZESTRIL) 5 mg tablet Take 1 Tab by mouth daily.  metoprolol succinate (TOPROL-XL) 25 mg XL tablet Take 1 Tab by mouth daily.  tamsulosin (FLOMAX) 0.4 mg capsule Take 1 Cap by mouth daily.  (Patient taking differently: Take 0.4 mg by mouth daily as needed.)    tadalafil (CIALIS) 5 mg tablet Take 1 Tab by mouth daily. (Patient taking differently: Take 5 mg by mouth daily as needed.)    acetaminophen (TYLENOL) 500 mg tablet Take 1 Tab by mouth every four (4) hours as needed. No current facility-administered medications for this visit. I have reviewed the nurses notes, vitals, problem list, allergy list, medical history, family, social history and medications. REVIEW OF SYMPTOMS      General: +fatigue, Pt denies excessive weight gain or loss. Pt is able to conduct ADL's  HEENT: Denies blurred vision, headaches, hearing loss, epistaxis and difficulty swallowing. Respiratory: Denies cough, congestion, shortness of breath, MATHEW, wheezing or stridor. Cardiovascular: Denies precordial pain, palpitations, edema or PND  Gastrointestinal: Denies poor appetite, indigestion, abdominal pain or blood in stool  Genitourinary: Denies hematuria, dysuria, increased urinary frequency  Musculoskeletal: Denies joint pain or swelling from muscles or joints  Neurologic: Denies tremor, paresthesias, headache, or sensory motor disturbance  Psychiatric: Denies confusion, insomnia, depression  Integumentray: Denies rash, itching or ulcers. Hematologic: Denies easy bruising, bleeding       PHYSICAL EXAMINATION      There were no vitals filed for this visit. General: Well developed, in no acute distress. HEENT: No jaundice, oral mucosa moist, no oral ulcers  Neck: Supple, no stiffness, no lymphadenopathy, supple  Heart:  Normal S1/S2 negative S3 or S4. Regular, no murmur, gallop or rub, no jugular venous distention  Respiratory: Clear bilaterally x 4, no wheezing or rales  Abdomen:   Soft, non-tender, bowel sounds are active.   Extremities:  No edema, normal cap refill, no cyanosis.   Musculoskeletal: No clubbing, no deformities  Neuro: A&Ox3, speech clear, gait stable, cooperative, no focal neurologic deficits  Skin: Skin color is normal. No rashes or lesions. Non diaphoretic, moist.  Vascular: 2+ pulses symmetric in all extremities       DIAGNOSTIC DATA      EKG: sinus rhythm with PVC       LABORATORY DATA      Lab Results   Component Value Date/Time    WBC 7.3 12/05/2018 03:51 AM    HGB 15.0 12/05/2018 03:51 AM    HCT 46.9 12/05/2018 03:51 AM    PLATELET 664 29/06/0557 03:51 AM    MCV 83.9 12/05/2018 03:51 AM      Lab Results   Component Value Date/Time    Sodium 134 (L) 12/05/2018 03:51 AM    Potassium 4.1 12/05/2018 03:51 AM    Chloride 101 12/05/2018 03:51 AM    CO2 24 12/05/2018 03:51 AM    Anion gap 9 12/05/2018 03:51 AM    Glucose 142 (H) 12/05/2018 03:51 AM    BUN 18 12/05/2018 03:51 AM    Creatinine 1.42 (H) 12/05/2018 03:51 AM    BUN/Creatinine ratio 13 12/05/2018 03:51 AM    GFR est AA >60 12/05/2018 03:51 AM    GFR est non-AA 50 (L) 12/05/2018 03:51 AM    Calcium 8.8 12/05/2018 03:51 AM    Bilirubin, total 1.1 (H) 11/25/2018 04:58 PM    AST (SGOT) 26 11/25/2018 04:58 PM    Alk. phosphatase 54 11/25/2018 04:58 PM    Protein, total 7.2 11/25/2018 04:58 PM    Albumin 3.7 11/25/2018 04:58 PM    Globulin 3.5 11/25/2018 04:58 PM    A-G Ratio 1.1 11/25/2018 04:58 PM    ALT (SGPT) 67 11/25/2018 04:58 PM           ASSESSMENT      1. Atrial fibrillation             A.  Persistent              B. Failed cardioversion x2             S. PVI 12/2018  2. Atrial flutter                         A. AFL ablation 12/2018  3. Chest pain  4. Cardiomyopathy  5. RAMIREZ  6.  Pericarditis       PLAN     Will do a trial of discontinuing BB to monitor for improvement in fatigue. Should he experience an increase in frequency of his palpitations will consider a trial of diltiazem if he has improvement in fatigue off of his beta-blocker. Should he feel in different while off of the beta-blocker, will restart this given his cardiomyopathy.        FOLLOW-UP     1 year    Thank you, Reg Alvarado DO and Dr. Belén Neely for allowing me to participate in the care of this extraordinarily pleasant male. Please do not hesitate to contact me for further questions/concerns. Fallon Rivera NP    Patient seen and examined by me with nurse practitioner. I personally performed all components of the history, physical, and medical decision making and agree with the assessment and plan with minor modifications as noted.        Edilma Liao MD  Cardiac Electrophysiology / Cardiology    [address]  [address]  [phone] / [phone] Fax   [phone] / [phone] Fax

## 2020-02-22 DIAGNOSIS — I48.91 ATRIAL FIBRILLATION WITH RVR (HCC): Primary | ICD-10-CM

## 2020-02-22 DIAGNOSIS — I48.91 ATRIAL FIBRILLATION, UNSPECIFIED TYPE (HCC): ICD-10-CM

## 2020-02-25 RX ORDER — METOPROLOL SUCCINATE 25 MG/1
TABLET, EXTENDED RELEASE ORAL
Qty: 90 TAB | Refills: 1 | Status: SHIPPED | OUTPATIENT
Start: 2020-02-25 | End: 2021-05-05 | Stop reason: ALTCHOICE

## 2020-02-25 RX ORDER — LISINOPRIL 5 MG/1
TABLET ORAL
Qty: 90 TAB | Refills: 2 | Status: SHIPPED | OUTPATIENT
Start: 2020-02-25 | End: 2020-10-29

## 2020-03-16 DIAGNOSIS — N40.1 BENIGN PROSTATIC HYPERPLASIA WITH NOCTURIA: ICD-10-CM

## 2020-03-16 DIAGNOSIS — R35.1 BENIGN PROSTATIC HYPERPLASIA WITH NOCTURIA: ICD-10-CM

## 2020-03-17 RX ORDER — TADALAFIL 5 MG/1
TABLET ORAL
Qty: 30 TAB | Refills: 11 | Status: SHIPPED | OUTPATIENT
Start: 2020-03-17 | End: 2021-09-14

## 2020-05-26 RX ORDER — TAMSULOSIN HYDROCHLORIDE 0.4 MG/1
CAPSULE ORAL
Qty: 90 CAP | Refills: 0 | Status: SHIPPED | OUTPATIENT
Start: 2020-05-26 | End: 2021-01-21

## 2020-05-26 NOTE — TELEPHONE ENCOUNTER
Called and spoke with patient and advised he is due to be seen for next fill. Patient verbalized understanding and will call back to schedule.

## 2020-08-06 ENCOUNTER — OFFICE VISIT (OUTPATIENT)
Dept: CARDIOLOGY CLINIC | Age: 65
End: 2020-08-06

## 2020-08-06 VITALS
DIASTOLIC BLOOD PRESSURE: 80 MMHG | HEART RATE: 68 BPM | HEIGHT: 71 IN | WEIGHT: 210 LBS | BODY MASS INDEX: 29.4 KG/M2 | SYSTOLIC BLOOD PRESSURE: 130 MMHG | OXYGEN SATURATION: 98 % | RESPIRATION RATE: 18 BRPM

## 2020-08-06 DIAGNOSIS — Z98.890 STATUS POST ABLATION OF ATRIAL FIBRILLATION: ICD-10-CM

## 2020-08-06 DIAGNOSIS — Z86.79 STATUS POST ABLATION OF ATRIAL FIBRILLATION: ICD-10-CM

## 2020-08-06 DIAGNOSIS — I49.3 PVC (PREMATURE VENTRICULAR CONTRACTION): ICD-10-CM

## 2020-08-06 DIAGNOSIS — I42.8 NICM (NONISCHEMIC CARDIOMYOPATHY) (HCC): ICD-10-CM

## 2020-08-06 DIAGNOSIS — G47.33 OSA (OBSTRUCTIVE SLEEP APNEA): ICD-10-CM

## 2020-08-06 DIAGNOSIS — I48.91 ATRIAL FIBRILLATION, UNSPECIFIED TYPE (HCC): Primary | ICD-10-CM

## 2020-08-06 DIAGNOSIS — I48.91 ATRIAL FIBRILLATION, UNSPECIFIED TYPE (HCC): ICD-10-CM

## 2020-08-06 PROCEDURE — 99215 OFFICE O/P EST HI 40 MIN: CPT | Performed by: INTERNAL MEDICINE

## 2020-08-06 PROCEDURE — 93000 ELECTROCARDIOGRAM COMPLETE: CPT | Performed by: INTERNAL MEDICINE

## 2020-08-06 NOTE — PROGRESS NOTES
HISTORY OF PRESENTING ILLNESS      Tez Miles III is a 75 y.o. male with persistent atrial fibrillation/atrial flutter who underwent AF and atrial flutter ablation now presenting for follow-up. His EF in 11/2018 was 20%. Cardiac catheterization at that time was negative for CAD. He discontinued his colchicine for pericarditis recently. 30-day monitor demonstrated sinus rhythm throughout. He remains on his Eliquis. Post ablation his EF improved to 40-45%. He remains fatigued on the beta blocker. He experienced a palpitation during his EKG today which demonstrated a single PVC. Last visit, started a trial of discontinuing BB to monitor for improvement in fatigue however he experienced tachycardia and resumed metoprolol. He reports daily episodes of intermittent chest pain, which has previously coincided with PVCs.          PAST MEDICAL HISTORY     Past Medical History:   Diagnosis Date    A-fib [facility])     Anxiety     At[facility] flutter [facility]     BPH     Depression     ED (erectile dysfunction)     Hearing loss     Heart failure ([facility]     11/25/18 with afib RVR    Herniated disc     Hypogonadism     RAMIREZ on CPAP 2019    Sleep apnea            PAST SURGICAL HISTORY     Past Surgical History:   Procedure Laterality Date    ECHO STRESS      HX HERNIA REPAIR      hernia repair \"20 years ago\"    HX ORTHOPAEDIC      L5-S1 surgery (non fusion)          ALLERGIES     No Known Allergies       FAMILY HISTORY     Family History   Problem Relation Age of Onset    Cancer Mother     Diabetes Father     Hypertension Father     Dementia Father     Cancer Sister     negative for cardiac disease       SOCIAL HISTORY     Social History     Socioeconomic History    Marital status: LIFE PARTNER     Spouse name: Not on file    Number of children: 4    Years of education: 1 year of college    Highest education level: Not on file   Occupational History    Occupation: [employer] Financial resource strain: Not hard at all   [employer] insecurity     Worry: Never true     Inability: Never true   [employer] needs     Medical: No     Non-medical: No   Tobacco Use    Smoking status: Former Smoker     Packs/day: 0.50     Years: 10.00     Pack years: 5.00     Last attempt to quit: 2000     Years since quittin.0    Smokeless tobacco: Never Used   Substance and Sexual Activity    Alcohol use: Yes     Alcohol/week: 2.0 - 3.0 standard drinks     Types: 2 - 3 Cans of beer per week     Comment: beer/wine    Drug use: No    Sexual activity: Yes   Lifestyle    Physical activity     Days per week: 0 days     Minutes per session: 0 min    Stress: Not at all   Relationships    Social connections     Talks on phone: Twice a week     Gets together: Once a week     Attends [employer] service: 1 to 4 times per year     Active member of club or organization: Yes     Attends meetings of clubs or organizations: Not on file     Relationship status: Living with partner   Other Topics Concern     Service No    Blood Transfusions No    Caffeine Concern No    Occupational Exposure No    Hobby Hazards No    Sleep Concern No    Stress Concern No    Weight Concern No    Special Diet No    Back Care No    Exercise No    Bike Helmet No    Seat Belt No    Self-Exams No         MEDICATIONS     Current Outpatient Medications   Medication Sig    tamsulosin (FLOMAX) 0.4 mg capsule TAKE 1 CAPSULE BY MOUTH EVERY DAY (Patient taking differently: as needed.)    tadalafiL (CIALIS) 5 mg tablet TAKE 1 TABLET BY MOUTH EVERY DAY (Patient taking differently: Take 5 mg by mouth daily as needed.)    metoprolol succinate (TOPROL-XL) 25 mg XL tablet TAKE 1 TABLET BY MOUTH EVERY DAY    lisinopril (PRINIVIL, ZESTRIL) 5 mg tablet TAKE 1 TABLET BY MOUTH EVERY DAY    apixaban (ELIQUIS) 5 mg tablet Take 1 Tab by mouth two (2) times a day.  (Patient taking differently: Take 5 mg by mouth two (2) times a day. Taking once a day)    acetaminophen (TYLENOL) 500 mg tablet Take 1 Tab by mouth every four (4) hours as needed. No current facility-administered medications for this visit. I have reviewed the nurses notes, vitals, problem list, allergy list, medical history, family, social history and medications. REVIEW OF SYMPTOMS      General: Pt denies excessive weight gain or loss. Pt is able to conduct ADL's  HEENT: Denies blurred vision, headaches, hearing loss, epistaxis and difficulty swallowing. Respiratory: Denies cough, congestion, shortness of breath, MATHEW, wheezing or stridor. Cardiovascular: +palpitations,cp Denies precordial pain, edema or PND  Gastrointestinal: Denies poor appetite, indigestion, abdominal pain or blood in stool  Genitourinary: Denies hematuria, dysuria, increased urinary frequency  Musculoskeletal: Denies joint pain or swelling from muscles or joints  Neurologic: Denies tremor, paresthesias, headache, or sensory motor disturbance  Psychiatric: Denies confusion, insomnia, depression  Integumentray: Denies rash, itching or ulcers. Hematologic: Denies easy bruising, bleeding       PHYSICAL EXAMINATION      Vitals: see vitals section  General: Well developed, in no acute distress. HEENT: No jaundice, oral mucosa moist, no oral ulcers  Neck: Supple, no stiffness, no lymphadenopathy, supple  Heart:  Normal S1/S2 negative S3 or S4. Regular, no murmur, gallop or rub, no jugular venous distention  Respiratory: Clear bilaterally x 4, no wheezing or rales  Abdomen:   Soft, non-tender, bowel sounds are active. Extremities:  No edema, normal cap refill, no cyanosis. Musculoskeletal: No clubbing, no deformities  Neuro: A&Ox3, speech clear, gait stable, cooperative, no focal neurologic deficits  Skin: Skin color is normal. No rashes or lesions.  Non diaphoretic, moist.  Vascular: 2+ pulses symmetric in all extremities       DIAGNOSTIC DATA      EKG: sinus rhythm        LABORATORY DATA Lab Results   Component Value Date/Time    WBC 7.3 12/05/2018 03:51 AM    HGB 15.0 12/05/2018 03:51 AM    HCT 46.9 12/05/2018 03:51 AM    PLATELET 121 65/81/5500 03:51 AM    MCV 83.9 12/05/2018 03:51 AM      Lab Results   Component Value Date/Time    Sodium 134 (L) 12/05/2018 03:51 AM    Potassium 4.1 12/05/2018 03:51 AM    Chloride 101 12/05/2018 03:51 AM    CO2 24 12/05/2018 03:51 AM    Anion gap 9 12/05/2018 03:51 AM    Glucose 142 (H) 12/05/2018 03:51 AM    BUN 18 12/05/2018 03:51 AM    Creatinine 1.42 (H) 12/05/2018 03:51 AM    BUN/Creatinine ratio 13 12/05/2018 03:51 AM    GFR est AA >60 12/05/2018 03:51 AM    GFR est non-AA 50 (L) 12/05/2018 03:51 AM    Calcium 8.8 12/05/2018 03:51 AM    Bilirubin, total 1.1 (H) 11/25/2018 04:58 PM    Alk. phosphatase 54 11/25/2018 04:58 PM    Protein, total 7.2 11/25/2018 04:58 PM    Albumin 3.7 11/25/2018 04:58 PM    Globulin 3.5 11/25/2018 04:58 PM    A-G Ratio 1.1 11/25/2018 04:58 PM    ALT (SGPT) 67 11/25/2018 04:58 PM           ASSESSMENT      1. Atrial fibrillation             A.  Persistent              B. Failed cardioversion x2             C. PVI 12/2018  2. Atrial flutter                         A. AFL ablation 12/2018  3. Chest pain  4. Cardiomyopathy  5. RAMIREZ  6. Pericarditis       PLAN     Will evaluate with 24 hour holter monitor and updated echocardiogram.       ICD-10-CM ICD-9-CM    1. Atrial fibrillation, unspecified type ([address]  I48.91 427.31 AMB POC EKG ROUTINE W/ 12 LEADS, INTER & REP      ECHO ADULT COMPLETE   2. PVC (premature ventricular contraction)  I49.3 427.69 AMB POC EKG ROUTINE W/ 12 LEADS, INTER & REP      ECHO ADULT COMPLETE   3. Status post ablation of atrial fibrillation  Z98.890 V45.89     Z86.79     4. RAMIREZ (obstructive sleep apnea)  G47.33 327.23    5.  NICM (nonischemic cardiomyopathy) (HCC)  I42.8 425.4      Orders Placed This Encounter    AMB POC EKG ROUTINE W/ 12 LEADS, INTER & REP     Order Specific Question:   Reason for Exam: Answer:   irregular heart beat          FOLLOW-UP       Thank you, Carolina Griffith DO and Dr. Deidre Watson for allowing me to participate in the care of this extraordinarily pleasant male. Please do not hesitate to contact me for further questions/concerns.      [address]  [address], [facility].    [place], [address]  [phone] / [phone] Fax   [phone] / [phone] Fax

## 2020-08-06 NOTE — PROGRESS NOTES
ROOM # 5  Chest discomfort, SOB with exertion  No ER or hospital visits recently  Visit Vitals  /80 (BP 1 Location: Left arm, BP Patient Position: Sitting)   Pulse 68   Resp 18   Ht 5' 11\" (1.803 m)   Wt 210 lb (95.3 kg)   SpO2 98%   BMI 29.29 kg/m²

## 2020-08-21 ENCOUNTER — TELEPHONE (OUTPATIENT)
Dept: CARDIOLOGY CLINIC | Age: 65
End: 2020-08-21

## 2020-08-24 NOTE — TELEPHONE ENCOUNTER
Called patient, ID verified using two patient identifiers. Advised patient that Dr. Donny Gonzales still needs to review his monitor results. Once he has reviewed it we can contact him with results. Confirmed upcoming appointments with patient.      Future Appointments   Date Time Provider Alanna Nohemi   8/31/2020  3:00 PM ODALYS TATUMF BS AMB   9/8/2020  9:00 AM MELINA VasquezF BS AMB   11/2/2020  9:20 AM Aida Weeks MD CAVSF BS AMB

## 2020-08-25 NOTE — TELEPHONE ENCOUNTER
Jak Nino NP  You Yesterday (11:00 AM)       Monitor shows less than 0.1% PVCs, doubtful this is causing his symptoms. Still needs echo, if he is continuing to have chest pain, can order nuclear stress as well. Called patient, ID verified using two patient identifiers. Advised him of above information per YOSELYN Edgar NP. Patient declines stress test at this time. He will have Echo as scheduled and follow up as planned. He will contact the office with any further questions or concerns.

## 2020-08-31 ENCOUNTER — ANCILLARY PROCEDURE (OUTPATIENT)
Dept: CARDIOLOGY CLINIC | Age: 65
End: 2020-08-31
Payer: COMMERCIAL

## 2020-08-31 VITALS
WEIGHT: 203 LBS | HEIGHT: 71 IN | DIASTOLIC BLOOD PRESSURE: 82 MMHG | SYSTOLIC BLOOD PRESSURE: 130 MMHG | BODY MASS INDEX: 28.42 KG/M2

## 2020-08-31 PROCEDURE — 93306 TTE W/DOPPLER COMPLETE: CPT | Performed by: INTERNAL MEDICINE

## 2020-09-01 LAB
ECHO AO ASC DIAM: 3.41 CM
ECHO AO ROOT DIAM: 2.99 CM
ECHO AV AREA PEAK VELOCITY: 3.88 CM2
ECHO AV AREA VTI: 3.59 CM2
ECHO AV AREA/BSA PEAK VELOCITY: 1.8 CM2/M2
ECHO AV AREA/BSA VTI: 1.7 CM2/M2
ECHO AV MEAN GRADIENT: 2.34 MMHG
ECHO AV PEAK GRADIENT: 3.75 MMHG
ECHO AV PEAK VELOCITY: 96.85 CM/S
ECHO AV VTI: 19.64 CM
ECHO LA AREA 4C: 21.76 CM2
ECHO LA MAJOR AXIS: 4.1 CM
ECHO LA MINOR AXIS: 1.93 CM
ECHO LA VOL 2C: 62.55 ML (ref 18–58)
ECHO LA VOL 4C: 62.87 ML (ref 18–58)
ECHO LA VOL BP: 67.37 ML (ref 18–58)
ECHO LA VOL/BSA BIPLANE: 31.74 ML/M2 (ref 16–28)
ECHO LA VOLUME INDEX A2C: 29.47 ML/M2 (ref 16–28)
ECHO LA VOLUME INDEX A4C: 29.62 ML/M2 (ref 16–28)
ECHO LV E' LATERAL VELOCITY: 9.26 CM/S
ECHO LV E' SEPTAL VELOCITY: 6.97 CM/S
ECHO LV EDV A2C: 85.11 ML
ECHO LV EDV A4C: 99.23 ML
ECHO LV EDV BP: 92.2 ML (ref 67–155)
ECHO LV EDV INDEX A4C: 46.8 ML/M2
ECHO LV EDV INDEX BP: 43.4 ML/M2
ECHO LV EDV NDEX A2C: 40.1 ML/M2
ECHO LV EJECTION FRACTION A2C: 60 PERCENT
ECHO LV EJECTION FRACTION A4C: 60 PERCENT
ECHO LV EJECTION FRACTION BIPLANE: 59.6 PERCENT (ref 55–100)
ECHO LV ESV A2C: 34.04 ML
ECHO LV ESV A4C: 39.24 ML
ECHO LV ESV BP: 37.23 ML (ref 22–58)
ECHO LV ESV INDEX A2C: 16 ML/M2
ECHO LV ESV INDEX A4C: 18.5 ML/M2
ECHO LV ESV INDEX BP: 17.5 ML/M2
ECHO LV INTERNAL DIMENSION DIASTOLIC: 4.6 CM (ref 4.2–5.9)
ECHO LV INTERNAL DIMENSION SYSTOLIC: 3.64 CM
ECHO LV IVSD: 1.21 CM (ref 0.6–1)
ECHO LV MASS 2D: 189.4 G (ref 88–224)
ECHO LV MASS INDEX 2D: 89.2 G/M2 (ref 49–115)
ECHO LV POSTERIOR WALL DIASTOLIC: 1.06 CM (ref 0.6–1)
ECHO LVOT DIAM: 2.23 CM
ECHO LVOT PEAK GRADIENT: 3.69 MMHG
ECHO LVOT PEAK VELOCITY: 96.02 CM/S
ECHO LVOT SV: 70.6 ML
ECHO LVOT VTI: 18.05 CM
ECHO MV A VELOCITY: 74.73 CM/S
ECHO MV AREA PHT: 4.52 CM2
ECHO MV AREA VTI: 3.06 CM2
ECHO MV E DECELERATION TIME (DT): 0.17 S
ECHO MV E VELOCITY: 54.67 CM/S
ECHO MV E/A RATIO: 0.73
ECHO MV E/E' LATERAL: 5.9
ECHO MV E/E' RATIO (AVERAGED): 6.87
ECHO MV E/E' SEPTAL: 7.84
ECHO MV MAX VELOCITY: 75.08 CM/S
ECHO MV MEAN GRADIENT: 0.97 MMHG
ECHO MV PEAK GRADIENT: 2.26 MMHG
ECHO MV PRESSURE HALF TIME (PHT): 0.05 S
ECHO MV VTI: 23.07 CM
ECHO RA AREA 4C: 13.33 CM2
ECHO RA MAJOR AXIS: 3 CM
ECHO RV INTERNAL DIMENSION: 2.58 CM
ECHO RV TAPSE: 2.17 CM (ref 1.5–2)
LA VOL DISK BP: 62.49 ML (ref 18–58)

## 2020-09-08 ENCOUNTER — OFFICE VISIT (OUTPATIENT)
Dept: CARDIOLOGY CLINIC | Age: 65
End: 2020-09-08
Payer: COMMERCIAL

## 2020-09-08 VITALS
HEIGHT: 71 IN | OXYGEN SATURATION: 98 % | DIASTOLIC BLOOD PRESSURE: 78 MMHG | BODY MASS INDEX: 29.54 KG/M2 | WEIGHT: 211 LBS | SYSTOLIC BLOOD PRESSURE: 128 MMHG | HEART RATE: 76 BPM

## 2020-09-08 DIAGNOSIS — Z98.890 S/P ABLATION OF ATRIAL FIBRILLATION: ICD-10-CM

## 2020-09-08 DIAGNOSIS — I49.3 PVC (PREMATURE VENTRICULAR CONTRACTION): ICD-10-CM

## 2020-09-08 DIAGNOSIS — I48.91 ATRIAL FIBRILLATION, UNSPECIFIED TYPE (HCC): Primary | ICD-10-CM

## 2020-09-08 DIAGNOSIS — G47.33 OSA (OBSTRUCTIVE SLEEP APNEA): ICD-10-CM

## 2020-09-08 DIAGNOSIS — Z86.79 S/P ABLATION OF ATRIAL FIBRILLATION: ICD-10-CM

## 2020-09-08 DIAGNOSIS — I42.8 NICM (NONISCHEMIC CARDIOMYOPATHY) (HCC): ICD-10-CM

## 2020-09-08 DIAGNOSIS — R53.83 FATIGUE, UNSPECIFIED TYPE: ICD-10-CM

## 2020-09-08 PROCEDURE — 99215 OFFICE O/P EST HI 40 MIN: CPT | Performed by: INTERNAL MEDICINE

## 2020-09-08 RX ORDER — DILTIAZEM HYDROCHLORIDE 120 MG/1
120 CAPSULE, EXTENDED RELEASE ORAL DAILY
Qty: 30 CAP | Refills: 0 | Status: SHIPPED | OUTPATIENT
Start: 2020-09-08 | End: 2020-10-14

## 2020-09-08 NOTE — PROGRESS NOTES
Room 2      Visit Vitals  /78 (BP 1 Location: Left arm, BP Patient Position: Sitting)   Pulse 76   Ht 5' 11\" (1.803 m)   Wt 211 lb (95.7 kg)   SpO2 98%   BMI 29.43 kg/m²   '  Chest pain: no  Shortness of breath: no  Edema: no  Palpitations, Skipped beats, Rapid heartbeat: no  Dizziness: no    New diagnosis/Surgeries: no    ER/Hospitalizations: no    Refills: NO

## 2020-09-08 NOTE — PROGRESS NOTES
HISTORY OF PRESENTING ILLNESS      Melecio Swanson III is a 75 y.o. male with persistent atrial fibrillation/atrial flutter who underwent AF and atrial flutter ablation now presenting for follow-up. His EF in 11/2018 was 20%. Cardiac catheterization at that time was negative for CAD. He discontinued his colchicine for pericarditis recently. 30-day monitor demonstrated sinus rhythm throughout. He remains on his Eliquis. Post ablation his EF improved to 40-45%. He remains fatigued on the beta blocker. He experienced a palpitation during his EKG today which demonstrated a single PVC. He did start a trial of discontinuing BB to monitor for improvement in fatigue however he experienced tachycardia and resumed metoprolol. He reports daily episodes of intermittent chest pain, which has previously coincided with PVCs. 24 hour holter monitor showed less than 0.1% PVCs. Echocardiogram demonstrated an EF of 60%, improved from 40%. He denies recurrence of chest pain for the past week, but is concerned because he say PVCs during his echocardiogram. Continues to have fatigue.         PAST MEDICAL HISTORY     Past Medical History:   Diagnosis Date    A-fib [facility])     Anxiety     Atrial flutter ([facility]     BPH     Depression     ED (erectile dysfunction)     Hearing loss     Heart failure ([facility]     11/25/18 with afib RVR    Herniated disc     Hypogonadism     RAMIREZ on CPAP 2019    Sleep apnea            PAST SURGICAL HISTORY     Past Surgical History:   Procedure Laterality Date    ECHO STRESS      HX HERNIA REPAIR      hernia repair \"20 years ago\"    HX ORTHOPAEDIC      L5-S1 surgery (non fusion)          ALLERGIES     No Known Allergies       FAMILY HISTORY     Family History   Problem Relation Age of Onset    Cancer Mother     Diabetes Father     Hypertension Father     Dementia Father     Cancer Sister     negative for cardiac disease       SOCIAL HISTORY     Social History Socioeconomic History    Marital status: LIFE PARTNER     Spouse name: Not on file    Number of children: 3    Years of education: 1 year of college    Highest education level: Not on file   Occupational History    Occupation: Sales   Social Needs    Financial resource strain: Not hard at all   Sara-Evaristo insecurity     Worry: Never true     Inability: Never true   [employer] needs     Medical: No     Non-medical: No   Tobacco Use    Smoking status: Former Smoker     Packs/day: 0.50     Years: 10.00     Pack years: 5.00     Last attempt to quit: 2000     Years since quittin.1    Smokeless tobacco: Never Used   Substance and Sexual Activity    Alcohol use: Yes     Alcohol/week: 2.0 - 3.0 standard drinks     Types: 2 - 3 Cans of beer per week     Comment: beer/wine    Drug use: No    Sexual activity: Yes   Lifestyle    Physical activity     Days per week: 0 days     Minutes per session: 0 min    Stress: Not at all   Relationships    Social connections     Talks on phone: Twice a week     Gets together: Once a week     Attends [employer] service: 1 to 4 times per year     Active member of club or organization: Yes     Attends meetings of clubs or organizations: Not on file     Relationship status: Living with partner   Other Topics Concern     Service No    Blood Transfusions No    Caffeine Concern No    Occupational Exposure No    Hobby Hazards No    Sleep Concern No    Stress Concern No    Weight Concern No    Special Diet No    Back Care No    Exercise No    Bike Helmet No    Seat Belt No    Self-Exams No         MEDICATIONS     Current Outpatient Medications   Medication Sig    dilTIAZem ER (DILACOR XR) 120 mg capsule Take 1 Cap by mouth daily.     tamsulosin (FLOMAX) 0.4 mg capsule TAKE 1 CAPSULE BY MOUTH EVERY DAY (Patient taking differently: as needed.)    tadalafiL (CIALIS) 5 mg tablet TAKE 1 TABLET BY MOUTH EVERY DAY (Patient taking differently: Take 5 mg by mouth daily as needed.)    metoprolol succinate (TOPROL-XL) 25 mg XL tablet TAKE 1 TABLET BY MOUTH EVERY DAY    lisinopril (PRINIVIL, ZESTRIL) 5 mg tablet TAKE 1 TABLET BY MOUTH EVERY DAY    apixaban (ELIQUIS) 5 mg tablet Take 1 Tab by mouth two (2) times a day.  acetaminophen (TYLENOL) 500 mg tablet Take 1 Tab by mouth every four (4) hours as needed. No current facility-administered medications for this visit. I have reviewed the nurses notes, vitals, problem list, allergy list, medical history, family, social history and medications. REVIEW OF SYMPTOMS      General: Pt denies excessive weight gain or loss. Pt is able to conduct ADL's  HEENT: Denies blurred vision, headaches, hearing loss, epistaxis and difficulty swallowing. Respiratory: Denies cough, congestion, shortness of breath, MATHEW, wheezing or stridor. Cardiovascular: Denies precordial pain, palpitations, edema or PND  Gastrointestinal: Denies poor appetite, indigestion, abdominal pain or blood in stool  Genitourinary: Denies hematuria, dysuria, increased urinary frequency  Musculoskeletal: Denies joint pain or swelling from muscles or joints  Neurologic: Denies tremor, paresthesias, headache, or sensory motor disturbance  Psychiatric: Denies confusion, insomnia, depression  Integumentray: Denies rash, itching or ulcers. Hematologic: Denies easy bruising, bleeding       PHYSICAL EXAMINATION      Vitals: see vitals section  General: Well developed, in no acute distress. HEENT: No jaundice, oral mucosa moist, no oral ulcers  Neck: Supple, no stiffness, no lymphadenopathy, supple  Heart:  Normal S1/S2 negative S3 or S4. Regular, no murmur, gallop or rub, no jugular venous distention  Respiratory: Clear bilaterally x 4, no wheezing or rales  Abdomen:   Soft, non-tender, bowel sounds are active. Extremities:  No edema, normal cap refill, no cyanosis.   Musculoskeletal: No clubbing, no deformities  Neuro: A&Ox3, speech clear, gait stable, cooperative, no focal neurologic deficits  Skin: Skin color is normal. No rashes or lesions. Non diaphoretic, moist.  Vascular: 2+ pulses symmetric in all extremities       DIAGNOSTIC DATA      EKG:        LABORATORY DATA      Lab Results   Component Value Date/Time    WBC 7.3 12/05/2018 03:51 AM    HGB 15.0 12/05/2018 03:51 AM    HCT 46.9 12/05/2018 03:51 AM    PLATELET 015 46/71/9413 03:51 AM    MCV 83.9 12/05/2018 03:51 AM      Lab Results   Component Value Date/Time    Sodium 134 (L) 12/05/2018 03:51 AM    Potassium 4.1 12/05/2018 03:51 AM    Chloride 101 12/05/2018 03:51 AM    CO2 24 12/05/2018 03:51 AM    Anion gap 9 12/05/2018 03:51 AM    Glucose 142 (H) 12/05/2018 03:51 AM    BUN 18 12/05/2018 03:51 AM    Creatinine 1.42 (H) 12/05/2018 03:51 AM    BUN/Creatinine ratio 13 12/05/2018 03:51 AM    GFR est AA >60 12/05/2018 03:51 AM    GFR est non-AA 50 (L) 12/05/2018 03:51 AM    Calcium 8.8 12/05/2018 03:51 AM    Bilirubin, total 1.1 (H) 11/25/2018 04:58 PM    Alk. phosphatase 54 11/25/2018 04:58 PM    Protein, total 7.2 11/25/2018 04:58 PM    Albumin 3.7 11/25/2018 04:58 PM    Globulin 3.5 11/25/2018 04:58 PM    A-G Ratio 1.1 11/25/2018 04:58 PM    ALT (SGPT) 67 11/25/2018 04:58 PM           ASSESSMENT      1. Atrial fibrillation             A.  Persistent              B. Failed cardioversion x2             C. PVI 12/2018  2. Atrial flutter                         A. AFL ablation 12/2018  3. Chest pain  4. Cardiomyopathy  5. RAMIREZ  6. Pericarditis       PLAN     Provided reassurance regarding results of echocardiogram and holter monitor. Suspect chest pain in non cardiac given normal cath in 2018. He deferred repeat cath at this time. Offered trial of diltiazem in place of BB to evaluate for fatigue as a side effect of BB therapy. He will replace metoprolol with diltiazem 120mg daily for 2-3 weeks and notify us if he has improvement. If fatigue is unchanged, continue metoprolol.        ICD-10-CM ICD-9-CM 1. Atrial fibrillation, unspecified type ([facility] [address]  I48.91 427.31    2. S/P ablation of atrial fibrillation  Z98.890 V45.89     Z86.79     3. PVC (premature ventricular contraction)  I49.3 427.69    4. RAMIREZ (obstructive sleep apnea)  G47.33 327.23    5. NICM (nonischemic cardiomyopathy) (HCC)  I42.8 425.4    6. Fatigue, unspecified type  R53.83 780.79      Orders Placed This Encounter    dilTIAZem ER (DILACOR XR) 120 mg capsule     Sig: Take 1 Cap by mouth daily. Dispense:  30 Cap     Refill:  0          FOLLOW-UP   6 months     Thank you, Jackelin Lincoln DO for allowing me to participate in the care of this extraordinarily pleasant male. Please do not hesitate to contact me for further questions/concerns.      Merle Dilling, NP    [facility]  [address] [place] [address]  [phone] / [phone] Fax   [phone] / [phone] Fax

## 2020-09-18 DIAGNOSIS — I48.91 ATRIAL FIBRILLATION, UNSPECIFIED TYPE (HCC): ICD-10-CM

## 2020-09-18 RX ORDER — APIXABAN 5 MG/1
TABLET, FILM COATED ORAL
Qty: 180 TAB | Refills: 2 | Status: SHIPPED | OUTPATIENT
Start: 2020-09-18 | End: 2021-10-18

## 2020-10-14 RX ORDER — DILTIAZEM HYDROCHLORIDE 120 MG/1
CAPSULE, EXTENDED RELEASE ORAL
Qty: 30 CAP | Refills: 0 | Status: SHIPPED | OUTPATIENT
Start: 2020-10-14 | End: 2020-11-23

## 2020-10-28 DIAGNOSIS — I48.91 ATRIAL FIBRILLATION WITH RVR (HCC): ICD-10-CM

## 2020-10-28 DIAGNOSIS — I48.91 ATRIAL FIBRILLATION, UNSPECIFIED TYPE (HCC): ICD-10-CM

## 2020-10-29 RX ORDER — LISINOPRIL 5 MG/1
TABLET ORAL
Qty: 90 TAB | Refills: 2 | Status: SHIPPED | OUTPATIENT
Start: 2020-10-29 | End: 2021-09-14

## 2020-11-23 RX ORDER — DILTIAZEM HYDROCHLORIDE 120 MG/1
CAPSULE, EXTENDED RELEASE ORAL
Qty: 30 CAP | Refills: 0 | Status: SHIPPED | OUTPATIENT
Start: 2020-11-23 | End: 2021-05-05 | Stop reason: ALTCHOICE

## 2021-01-21 RX ORDER — TAMSULOSIN HYDROCHLORIDE 0.4 MG/1
CAPSULE ORAL
Qty: 90 CAP | Refills: 0 | Status: SHIPPED | OUTPATIENT
Start: 2021-01-21 | End: 2021-06-02

## 2021-01-21 NOTE — TELEPHONE ENCOUNTER
Called and spoke with patient. Advised that he is due to be seen in order to get further refills. Patient verbalized understanding and states that he will call back to schedule.

## 2021-03-17 ENCOUNTER — OFFICE VISIT (OUTPATIENT)
Dept: CARDIOLOGY CLINIC | Age: 66
End: 2021-03-17
Payer: COMMERCIAL

## 2021-03-17 VITALS
WEIGHT: 211.6 LBS | DIASTOLIC BLOOD PRESSURE: 88 MMHG | HEART RATE: 86 BPM | BODY MASS INDEX: 29.62 KG/M2 | SYSTOLIC BLOOD PRESSURE: 136 MMHG | HEIGHT: 71 IN | RESPIRATION RATE: 16 BRPM | OXYGEN SATURATION: 98 %

## 2021-03-17 DIAGNOSIS — I49.3 PVC (PREMATURE VENTRICULAR CONTRACTION): ICD-10-CM

## 2021-03-17 DIAGNOSIS — Z86.79 S/P ABLATION OF ATRIAL FIBRILLATION: ICD-10-CM

## 2021-03-17 DIAGNOSIS — Z98.890 S/P ABLATION OF ATRIAL FIBRILLATION: ICD-10-CM

## 2021-03-17 DIAGNOSIS — I48.91 ATRIAL FIBRILLATION WITH RVR (HCC): Primary | ICD-10-CM

## 2021-03-17 DIAGNOSIS — G47.33 OSA (OBSTRUCTIVE SLEEP APNEA): ICD-10-CM

## 2021-03-17 DIAGNOSIS — I42.8 NICM (NONISCHEMIC CARDIOMYOPATHY) (HCC): ICD-10-CM

## 2021-03-17 DIAGNOSIS — R53.83 FATIGUE, UNSPECIFIED TYPE: ICD-10-CM

## 2021-03-17 PROCEDURE — 99215 OFFICE O/P EST HI 40 MIN: CPT | Performed by: INTERNAL MEDICINE

## 2021-03-17 PROCEDURE — 93000 ELECTROCARDIOGRAM COMPLETE: CPT | Performed by: INTERNAL MEDICINE

## 2021-03-17 NOTE — PROGRESS NOTES
HISTORY OF PRESENTING ILLNESS      Tre Raya III is a 65 y.o. male with persistent atrial fibrillation/atrial flutter who underwent AF and atrial flutter ablation now presenting for follow-up. His EF in 11/2018 was 20%. Cardiac catheterization at that time was negative for CAD.  He discontinued his colchicine for pericarditis recently.  30-day monitor demonstrated sinus rhythm throughout.  He remains on his Eliquis. Post ablation his EF improved to 40-45%. He believed he was fatigued on the beta blocker. He experienced a palpitation during his EKG today which demonstrated a single PVC.     We discontinued his beta-blocker to monitor for improvement in fatigue with plan to start diltiazem for increase in frequency of palpitations.  He is no longer on metoprolol however continues to experience of fatigue particularly with exertion.  He denies chest pain, shortness of breath, syncope.  Echocardiogram in 8/2020 demonstrated preserved LV function with an EF of 60%.  Continues to experience occasional though rare palpitations that are consistent with the symptoms he experienced/reported during PVCs in the past.       PAST MEDICAL HISTORY     Past Medical History:   Diagnosis Date   • A-fib (HCC)    • Anxiety    • Atrial flutter (HCC)    • BPH    • Depression    • ED (erectile dysfunction)    • Hearing loss    • Heart failure ([facility])     11/25/18 with afib RVR   • Herniated disc    • Hypogonadism    • RAMIREZ on CPAP 2019   • Sleep apnea            PAST SURGICAL HISTORY     Past Surgical History:   Procedure Laterality Date   • ECHO STRESS     • HX HERNIA REPAIR      hernia repair \"20 years ago\"   • HX ORTHOPAEDIC      L5-S1 surgery (non fusion)          ALLERGIES     No Known Allergies       FAMILY HISTORY     Family History   Problem Relation Age of Onset   • Cancer Mother    • Diabetes Father    • Hypertension Father    • Dementia Father    • Cancer Sister     negative for cardiac disease       SOCIAL  HISTORY     Social History     Socioeconomic History    Marital status: LIFE PARTNER     Spouse name: Not on file    Number of children: 3    Years of education: 1 year of college    Highest education level: Not on file   Occupational History    Occupation: Sales   Social Needs    Financial resource strain: Not hard at all   Loris-Evaristo insecurity     Worry: Never true     Inability: Never true   [employer] needs     Medical: No     Non-medical: No   Tobacco Use    Smoking status: Former Smoker     Packs/day: 0.50     Years: 10.00     Pack years: 5.00     Quit date: 2000     Years since quittin.6    Smokeless tobacco: Never Used   Substance and Sexual Activity    Alcohol use:  Yes     Alcohol/week: 2.0 - 3.0 standard drinks     Types: 2 - 3 Cans of beer per week     Comment: beer/wine    Drug use: No    Sexual activity: Yes   Lifestyle    Physical activity     Days per week: 0 days     Minutes per session: 0 min    Stress: Not at all   Relationships    Social connections     Talks on phone: Twice a week     Gets together: Once a week     Attends [employer] service: 1 to 4 times per year     Active member of club or organization: Yes     Attends meetings of clubs or organizations: Not on file     Relationship status: Living with partner   Other Topics Concern     Service No    Blood Transfusions No    Caffeine Concern No    Occupational Exposure No    Hobby Hazards No    Sleep Concern No    Stress Concern No    Weight Concern No    Special Diet No    Back Care No    Exercise No    Bike Helmet No    Seat Belt No    Self-Exams No         MEDICATIONS     Current Outpatient Medications   Medication Sig    tamsulosin (FLOMAX) 0.4 mg capsule TAKE 1 CAPSULE BY MOUTH EVERY DAY    DILT- mg capsule TAKE 1 CAPSULE BY MOUTH EVERY DAY    lisinopriL (PRINIVIL, ZESTRIL) 5 mg tablet TAKE 1 TABLET BY MOUTH EVERY DAY    Eliquis 5 mg tablet TAKE 1 TABLET BY MOUTH TWICE A DAY    tadalafiL (CIALIS) 5 mg tablet TAKE 1 TABLET BY MOUTH EVERY DAY (Patient taking differently: Take 5 mg by mouth daily as needed.)    metoprolol succinate (TOPROL-XL) 25 mg XL tablet TAKE 1 TABLET BY MOUTH EVERY DAY    acetaminophen (TYLENOL) 500 mg tablet Take 1 Tab by mouth every four (4) hours as needed. No current facility-administered medications for this visit. I have reviewed the nurses notes, vitals, problem list, allergy list, medical history, family, social history and medications. REVIEW OF SYMPTOMS      General: Pt denies excessive weight gain or loss. Pt is able to conduct ADL's  HEENT: Denies blurred vision, headaches, hearing loss, epistaxis and difficulty swallowing. Respiratory: Denies cough, congestion, shortness of breath, MATHEW, wheezing or stridor. Cardiovascular: Denies precordial pain, palpitations, edema or PND  Gastrointestinal: Denies poor appetite, indigestion, abdominal pain or blood in stool  Genitourinary: Denies hematuria, dysuria, increased urinary frequency  Musculoskeletal: Denies joint pain or swelling from muscles or joints  Neurologic: Denies tremor, paresthesias, headache, or sensory motor disturbance  Psychiatric: Denies confusion, insomnia, depression  Integumentray: Denies rash, itching or ulcers. Hematologic: Denies easy bruising, bleeding       PHYSICAL EXAMINATION      Vitals: see vitals section  General: Well developed, in no acute distress. HEENT: No jaundice, oral mucosa moist, no oral ulcers  Neck: Supple, no stiffness, no lymphadenopathy, supple  Heart:  Normal S1/S2 negative S3 or S4. Regular, no murmur, gallop or rub, no jugular venous distention  Respiratory: Clear bilaterally x 4, no wheezing or rales  Abdomen:   Soft, non-tender, bowel sounds are active. Extremities:  No edema, normal cap refill, no cyanosis.   Musculoskeletal: No clubbing, no deformities  Neuro: A&Ox3, speech clear, gait stable, cooperative, no focal neurologic deficits  Skin: Skin color is normal. No rashes or lesions. Non diaphoretic, moist.  Vascular: 2+ pulses symmetric in all extremities       DIAGNOSTIC DATA      EKG:        LABORATORY DATA      Lab Results   Component Value Date/Time    WBC 7.3 12/05/2018 03:51 AM    HGB 15.0 12/05/2018 03:51 AM    HCT 46.9 12/05/2018 03:51 AM    PLATELET 995 40/99/3304 03:51 AM    MCV 83.9 12/05/2018 03:51 AM      Lab Results   Component Value Date/Time    Sodium 134 (L) 12/05/2018 03:51 AM    Potassium 4.1 12/05/2018 03:51 AM    Chloride 101 12/05/2018 03:51 AM    CO2 24 12/05/2018 03:51 AM    Anion gap 9 12/05/2018 03:51 AM    Glucose 142 (H) 12/05/2018 03:51 AM    BUN 18 12/05/2018 03:51 AM    Creatinine 1.42 (H) 12/05/2018 03:51 AM    BUN/Creatinine ratio 13 12/05/2018 03:51 AM    GFR est AA >60 12/05/2018 03:51 AM    GFR est non-AA 50 (L) 12/05/2018 03:51 AM    Calcium 8.8 12/05/2018 03:51 AM    Bilirubin, total 1.1 (H) 11/25/2018 04:58 PM    Alk. phosphatase 54 11/25/2018 04:58 PM    Protein, total 7.2 11/25/2018 04:58 PM    Albumin 3.7 11/25/2018 04:58 PM    Globulin 3.5 11/25/2018 04:58 PM    A-G Ratio 1.1 11/25/2018 04:58 PM    ALT (SGPT) 67 11/25/2018 04:58 PM           ASSESSMENT      1. Atrial fibrillation             A.  Persistent              B. Failed cardioversion x2             Y. PVI 12/2018  2. Atrial flutter                         A. AFL ablation 12/2018  3. Chest pain  4. Cardiomyopathy  5. RAMIREZ  6.  Pericarditis-resolved  7. Fatigue  8. Erectile dysfunction       PLAN     Fatigue is of unclear etiology. Will obtain blood work including CBC, TSH and metabolic panel. Will obtain a stress echocardiogram given his previous history of fluctuating cardiomyopathy and PVCs with fatigue that is persisted despite discontinuation of beta-blocker therapy. ICD-10-CM ICD-9-CM    1. Atrial fibrillation with RVR (HCC)  I48.91 427.31    2. S/P ablation of atrial fibrillation  Z98.890 V45.89     Z86.79     3.  PVC (premature ventricular contraction)  I49.3 427.69    4. RAMIREZ (obstructive sleep apnea)  G47.33 327.23    5. NICM (nonischemic cardiomyopathy) (HCC)  I42.8 425.4      No orders of the defined types were placed in this encounter. FOLLOW-UP       Thank you, Yoandy Wall DO and Dr. Amilcar Brand for allowing me to participate in the care of this extraordinarily pleasant male. Please do not hesitate to contact me for further questions/concerns.          Sandeep Lara MD  Cardiac Electrophysiology / Cardiology    [facility] [address]  [address] [place] [address]  [phone] / [phone] Fax   [phone] / [phone] Fax

## 2021-03-17 NOTE — PROGRESS NOTES
Room #: 2      Visit Vitals  /88 (BP 1 Location: Left upper arm, BP Patient Position: Sitting, BP Cuff Size: Adult)   Pulse 86   Resp 16   Ht 5' 11\" (1.803 m)   Wt 211 lb 9.6 oz (96 kg)   SpO2 98%   BMI 29.51 kg/m²         Chest pain:  NO  Shortness of breath:  YES  Edema: NO  Palpitations, skipped beats, rapid heartbeat:  NO  Dizziness:  NO    1. Have you been to the ER, urgent care clinic since your last visit? Hospitalized since your last visit? No    2. Have you seen or consulted any other health care providers outside of the [address] [place] [address] since your last visit? Include any pap smears or colon screening.  No      Refills:  NO

## 2021-03-31 ENCOUNTER — TELEPHONE (OUTPATIENT)
Dept: CARDIOLOGY CLINIC | Age: 66
End: 2021-03-31

## 2021-03-31 DIAGNOSIS — Z98.890 S/P ABLATION OF ATRIAL FIBRILLATION: ICD-10-CM

## 2021-03-31 DIAGNOSIS — Z86.79 S/P ABLATION OF ATRIAL FIBRILLATION: ICD-10-CM

## 2021-03-31 DIAGNOSIS — I48.91 ATRIAL FIBRILLATION WITH RVR (HCC): Primary | ICD-10-CM

## 2021-03-31 DIAGNOSIS — I48.91 ATRIAL FIBRILLATION, UNSPECIFIED TYPE (HCC): ICD-10-CM

## 2021-03-31 DIAGNOSIS — I42.8 NICM (NONISCHEMIC CARDIOMYOPATHY) (HCC): ICD-10-CM

## 2021-03-31 DIAGNOSIS — R53.83 FATIGUE, UNSPECIFIED TYPE: ICD-10-CM

## 2021-03-31 DIAGNOSIS — I49.3 PVC (PREMATURE VENTRICULAR CONTRACTION): ICD-10-CM

## 2021-03-31 DIAGNOSIS — R00.2 PALPITATION: ICD-10-CM

## 2021-04-19 ENCOUNTER — ANCILLARY PROCEDURE (OUTPATIENT)
Dept: CARDIOLOGY CLINIC | Age: 66
End: 2021-04-19
Payer: COMMERCIAL

## 2021-04-19 ENCOUNTER — TELEPHONE (OUTPATIENT)
Dept: CARDIOLOGY CLINIC | Age: 66
End: 2021-04-19

## 2021-04-19 VITALS — WEIGHT: 211 LBS | BODY MASS INDEX: 29.54 KG/M2 | HEIGHT: 71 IN

## 2021-04-19 DIAGNOSIS — I48.91 ATRIAL FIBRILLATION WITH RVR (HCC): ICD-10-CM

## 2021-04-19 DIAGNOSIS — R00.2 PALPITATION: ICD-10-CM

## 2021-04-19 DIAGNOSIS — Z86.79 S/P ABLATION OF ATRIAL FIBRILLATION: ICD-10-CM

## 2021-04-19 DIAGNOSIS — I49.3 PVC (PREMATURE VENTRICULAR CONTRACTION): ICD-10-CM

## 2021-04-19 DIAGNOSIS — I42.8 NICM (NONISCHEMIC CARDIOMYOPATHY) (HCC): ICD-10-CM

## 2021-04-19 DIAGNOSIS — R53.83 FATIGUE, UNSPECIFIED TYPE: ICD-10-CM

## 2021-04-19 DIAGNOSIS — I48.91 ATRIAL FIBRILLATION, UNSPECIFIED TYPE (HCC): ICD-10-CM

## 2021-04-19 DIAGNOSIS — Z98.890 S/P ABLATION OF ATRIAL FIBRILLATION: ICD-10-CM

## 2021-04-19 LAB
STRESS ANGINA INDEX: 0
STRESS BASELINE DIAS BP: 76 MMHG
STRESS BASELINE HR: 77 BPM
STRESS BASELINE SYS BP: 130 MMHG
STRESS ESTIMATED WORKLOAD: 10.1 METS
STRESS EXERCISE DUR MIN: NORMAL
STRESS O2 SAT PEAK: 97 %
STRESS O2 SAT REST: 97 %
STRESS PEAK DIAS BP: 90 MMHG
STRESS PEAK SYS BP: 160 MMHG
STRESS PERCENT HR ACHIEVED: 90 %
STRESS POST PEAK HR: 137 BPM
STRESS RATE PRESSURE PRODUCT: NORMAL BPM*MMHG
STRESS ST DEPRESSION: 0 MM
STRESS ST ELEVATION: 0 MM
STRESS TARGET HR: 153 BPM

## 2021-04-19 PROCEDURE — 93015 CV STRESS TEST SUPVJ I&R: CPT | Performed by: SPECIALIST

## 2021-04-19 NOTE — TELEPHONE ENCOUNTER
----- Message from Cedrick Ortiz MD sent at 4/19/2021  1:32 PM EDT -----  Please notify patient of normal stress results  ----- Message -----  From: Christos Duke MD  Sent: 4/19/2021   1:22 PM EDT  To: Cedrick Ortiz MD      Called patient, ID verified using two patient identifiers. Informed patient of above information per Dr. Elsie Marie request.  Patient verbalizes understanding of all information. He will follow up as scheduled.      Future Appointments   Date Time Provider Alanna Song   5/5/2021  8:40 AM Kita Weeks MD [facility]

## 2021-05-05 ENCOUNTER — OFFICE VISIT (OUTPATIENT)
Dept: CARDIOLOGY CLINIC | Age: 66
End: 2021-05-05
Payer: COMMERCIAL

## 2021-05-05 VITALS
HEIGHT: 71 IN | SYSTOLIC BLOOD PRESSURE: 126 MMHG | RESPIRATION RATE: 18 BRPM | DIASTOLIC BLOOD PRESSURE: 80 MMHG | WEIGHT: 208.8 LBS | HEART RATE: 82 BPM | BODY MASS INDEX: 29.23 KG/M2 | OXYGEN SATURATION: 97 %

## 2021-05-05 DIAGNOSIS — I48.91 ATRIAL FIBRILLATION WITH RVR (HCC): Primary | ICD-10-CM

## 2021-05-05 PROCEDURE — 99214 OFFICE O/P EST MOD 30 MIN: CPT | Performed by: INTERNAL MEDICINE

## 2021-05-05 NOTE — PROGRESS NOTES
Room #: 4    Would like to stop lisinopril. Occasional PVC's. Visit Vitals  /80 (BP 1 Location: Left upper arm, BP Patient Position: Sitting, BP Cuff Size: Adult)   Pulse 82   Resp 18   Ht 5' 11\" (1.803 m)   Wt 208 lb 12.8 oz (94.7 kg)   SpO2 97%   BMI 29.12 kg/m²         Chest pain:  NO  Shortness of breath:  NO  Edema: NO  Palpitations, skipped beats, rapid heartbeat:  YES - Occasional PVC's  Dizziness:  NO    1. Have you been to the ER, urgent care clinic since your last visit? Hospitalized since your last visit? No    2. Have you seen or consulted any other health care providers outside of the [address] [place] [address] since your last visit? Include any pap smears or colon screening.  No      Refills:  NO

## 2021-05-05 NOTE — PROGRESS NOTES
HISTORY OF PRESENTING ILLNESS      Roz Shannon III is a 77 y.o. male with persistent atrial fibrillation/atrial flutter who underwent AF and atrial flutter ablation now presenting for follow-up. His EF in 11/2018 was 20%. Cardiac catheterization at that time was negative for CAD. He discontinued his colchicine for pericarditis recently. 30-day monitor demonstrated sinus rhythm throughout. He remains on his Eliquis. Post ablation his EF improved to 40-45%. He believed he was fatigued on the beta blocker. He experienced a palpitation during his EKG today which demonstrated a single PVC. We discontinued his beta-blocker to monitor for improvement in fatigue with plan to start diltiazem for increase in frequency of palpitations. He is no longer on metoprolol however continues to experience of fatigue particularly with exertion. He denies chest pain, shortness of breath, syncope. Echocardiogram in 8/2020 demonstrated preserved LV function with an EF of 60%. Continues to experience occasional though rare palpitations that are consistent with the symptoms he experienced/reported during PVCs in the past.  Fatigue is of unclear etiology. Will obtain blood work including CBC, TSH and metabolic panel. Will obtain a stress echocardiogram given his previous history of fluctuating cardiomyopathy and PVCs with fatigue that is persisted despite discontinuation of beta-blocker therapy. Stress test was negative for inducible ischemia and achieved a target heart rate of 153 bpm representing a 90% achievement of target heart rate. Continues to have fatigue.       PAST MEDICAL HISTORY     Past Medical History:   Diagnosis Date    A-fib [facility])     Anxiety     [facility] fl[facility] [address]     BPH     Depression     ED (erectile dysfunction)     Hearing loss     Heart failure ([facility] [address]     11/25/18 with afib RVR    Herniated disc     Hypogonadism     RAMIREZ on CPAP 2019    Sleep apnea            PAST SURGICAL HISTORY     Past Surgical History:   Procedure Laterality Date    ECHO STRESS      HX HERNIA REPAIR      hernia repair \"20 years ago\"    HX ORTHOPAEDIC      L5-S1 surgery (non fusion)          ALLERGIES     No Known Allergies       FAMILY HISTORY     Family History   Problem Relation Age of Onset    Cancer Mother     Diabetes Father     Hypertension Father     Dementia Father     Cancer Sister     negative for cardiac disease       SOCIAL HISTORY     Social History     Socioeconomic History    Marital status: LIFE PARTNER     Spouse name: Not on file    Number of children: 3    Years of education: 1 year of college    Highest education level: Not on file   Occupational History    Occupation: Sales   Social Needs    Financial resource strain: Not hard at all   Sara-xzoops insecurity     Worry: Never true     Inability: Never true    Transportation needs     Medical: No     Non-medical: No   Tobacco Use    Smoking status: Former Smoker     Packs/day: 0.50     Years: 10.00     Pack years: 5.00     Quit date: 2000     Years since quittin.7    Smokeless tobacco: Never Used   Substance and Sexual Activity    Alcohol use:  Yes     Alcohol/week: 2.0 - 3.0 standard drinks     Types: 2 - 3 Cans of beer per week     Comment: beer/wine    Drug use: No    Sexual activity: Yes   Lifestyle    Physical activity     Days per week: 0 days     Minutes per session: 0 min    Stress: Not at all   Relationships    Social connections     Talks on phone: Twice a week     Gets together: Once a week     Attends Confucianism service: 1 to 4 times per year     Active member of club or organization: Yes     Attends meetings of clubs or organizations: Not on file     Relationship status: Living with partner   Other Topics Concern     Service No    Blood Transfusions No    Caffeine Concern No    Occupational Exposure No    Hobby Hazards No    Sleep Concern No    Stress Concern No    Weight Concern No    Special Diet No    Back Care No    Exercise No    Bike Helmet No    Seat Belt No    Self-Exams No         MEDICATIONS     Current Outpatient Medications   Medication Sig    tamsulosin (FLOMAX) 0.4 mg capsule TAKE 1 CAPSULE BY MOUTH EVERY DAY    DILT- mg capsule TAKE 1 CAPSULE BY MOUTH EVERY DAY    lisinopriL (PRINIVIL, ZESTRIL) 5 mg tablet TAKE 1 TABLET BY MOUTH EVERY DAY    Eliquis 5 mg tablet TAKE 1 TABLET BY MOUTH TWICE A DAY    tadalafiL (CIALIS) 5 mg tablet TAKE 1 TABLET BY MOUTH EVERY DAY (Patient taking differently: Take 5 mg by mouth daily as needed.)    metoprolol succinate (TOPROL-XL) 25 mg XL tablet TAKE 1 TABLET BY MOUTH EVERY DAY    acetaminophen (TYLENOL) 500 mg tablet Take 1 Tab by mouth every four (4) hours as needed. No current facility-administered medications for this visit. I have reviewed the nurses notes, vitals, problem list, allergy list, medical history, family, social history and medications. REVIEW OF SYMPTOMS      General: Pt denies excessive weight gain or loss. Pt is able to conduct ADL's  HEENT: Denies blurred vision, headaches, hearing loss, epistaxis and difficulty swallowing. Respiratory: Denies cough, congestion, shortness of breath, MATHEW, wheezing or stridor. Cardiovascular: Denies precordial pain, palpitations, edema or PND  Gastrointestinal: Denies poor appetite, indigestion, abdominal pain or blood in stool  Genitourinary: Denies hematuria, dysuria, increased urinary frequency  Musculoskeletal: Denies joint pain or swelling from muscles or joints  Neurologic: Denies tremor, paresthesias, headache, or sensory motor disturbance  Psychiatric: Denies confusion, insomnia, depression  Integumentray: Denies rash, itching or ulcers. Hematologic: Denies easy bruising, bleeding       PHYSICAL EXAMINATION      Vitals: see vitals section  General: Well developed, in no acute distress.   HEENT: No jaundice, oral mucosa moist, no oral ulcers  Neck: Supple, no stiffness, no lymphadenopathy, supple  Heart:  Normal S1/S2 negative S3 or S4. Regular, no murmur, gallop or rub, no jugular venous distention  Respiratory: Clear bilaterally x 4, no wheezing or rales  Abdomen:   Soft, non-tender, bowel sounds are active. Extremities:  No edema, normal cap refill, no cyanosis. Musculoskeletal: No clubbing, no deformities  Neuro: A&Ox3, speech clear, gait stable, cooperative, no focal neurologic deficits  Skin: Skin color is normal. No rashes or lesions. Non diaphoretic, moist.  Vascular: 2+ pulses symmetric in all extremities       DIAGNOSTIC DATA      EKG:        LABORATORY DATA      Lab Results   Component Value Date/Time    WBC 5.9 03/17/2021 09:12 AM    HGB 14.8 03/17/2021 09:12 AM    HCT 46.6 03/17/2021 09:12 AM    PLATELET 802 29/57/0226 09:12 AM    MCV 86.9 03/17/2021 09:12 AM      Lab Results   Component Value Date/Time    Sodium 138 03/17/2021 09:12 AM    Potassium 4.1 03/17/2021 09:12 AM    Chloride 105 03/17/2021 09:12 AM    CO2 28 03/17/2021 09:12 AM    Anion gap 5 03/17/2021 09:12 AM    Glucose 100 03/17/2021 09:12 AM    BUN 14 03/17/2021 09:12 AM    Creatinine 1.03 03/17/2021 09:12 AM    BUN/Creatinine ratio 14 03/17/2021 09:12 AM    GFR est AA >60 03/17/2021 09:12 AM    GFR est non-AA >60 03/17/2021 09:12 AM    Calcium 9.0 03/17/2021 09:12 AM    Bilirubin, total 1.1 (H) 11/25/2018 04:58 PM    Alk. phosphatase 54 11/25/2018 04:58 PM    Protein, total 7.2 11/25/2018 04:58 PM    Albumin 3.7 11/25/2018 04:58 PM    Globulin 3.5 11/25/2018 04:58 PM    A-G Ratio 1.1 11/25/2018 04:58 PM    ALT (SGPT) 67 11/25/2018 04:58 PM           ASSESSMENT      1. Atrial fibrillation             A.  Persistent              B. Failed cardioversion x2             C. PVI 12/2018  2. Atrial flutter                         A. AFL ablation 12/2018  3. Chest pain  4. Cardiomyopathy  5. RAMIREZ  6. Pericarditis-resolved  7. Fatigue  8.   Erectile dysfunction         PLAN     Trial of holding lisinopril and if fatigue improves will seek alternate agent. If no improvement, resume lisinopril. Improve conditioning. FOLLOW-UP     6 mos    Thank you, Ranjit Mead DO and Dr. Chun Galarza for allowing me to participate in the care of this extraordinarily pleasant male. Please do not hesitate to contact me for further questions/concerns.          Princess Metzger MD  Cardiac Electrophysiology / Cardiology    [address]  [address] [place] [address], [facility]. Kaylan Lucero.    [facility], [facility]  [phone] / [phone] Fax   [phone] / [phone] Fax

## 2021-05-11 RX ORDER — TAMSULOSIN HYDROCHLORIDE 0.4 MG/1
CAPSULE ORAL
Qty: 90 CAP | Refills: 0 | OUTPATIENT
Start: 2021-05-11

## 2021-05-24 ENCOUNTER — TELEPHONE (OUTPATIENT)
Dept: FAMILY MEDICINE CLINIC | Age: 66
End: 2021-05-24

## 2021-05-24 NOTE — TELEPHONE ENCOUNTER
Patient came into office for walk in, explained no longer have walk ins. Patient became upset. Checked with two providers unable to see this am.      Patient wanted refill of Flomax  Call to [facility] on [address]. Appt made for 6-4-21.

## 2021-06-02 RX ORDER — TAMSULOSIN HYDROCHLORIDE 0.4 MG/1
CAPSULE ORAL
Qty: 90 CAPSULE | Refills: 0 | Status: SHIPPED | OUTPATIENT
Start: 2021-06-02 | End: 2021-06-04 | Stop reason: SDUPTHER

## 2021-06-04 ENCOUNTER — OFFICE VISIT (OUTPATIENT)
Dept: FAMILY MEDICINE CLINIC | Age: 66
End: 2021-06-04
Payer: COMMERCIAL

## 2021-06-04 VITALS
HEART RATE: 83 BPM | SYSTOLIC BLOOD PRESSURE: 143 MMHG | RESPIRATION RATE: 18 BRPM | OXYGEN SATURATION: 96 % | TEMPERATURE: 98.2 F | DIASTOLIC BLOOD PRESSURE: 78 MMHG | WEIGHT: 207 LBS | BODY MASS INDEX: 28.98 KG/M2 | HEIGHT: 71 IN

## 2021-06-04 DIAGNOSIS — M25.562 CHRONIC PAIN OF BOTH KNEES: Primary | ICD-10-CM

## 2021-06-04 DIAGNOSIS — R73.03 PRE-DIABETES: ICD-10-CM

## 2021-06-04 DIAGNOSIS — I83.812 VARICOSE VEINS OF LEG WITH PAIN, LEFT: ICD-10-CM

## 2021-06-04 DIAGNOSIS — G89.29 CHRONIC PAIN OF BOTH KNEES: Primary | ICD-10-CM

## 2021-06-04 DIAGNOSIS — Z12.5 PROSTATE CANCER SCREENING: ICD-10-CM

## 2021-06-04 DIAGNOSIS — M25.561 CHRONIC PAIN OF BOTH KNEES: Primary | ICD-10-CM

## 2021-06-04 DIAGNOSIS — R39.12 BENIGN PROSTATIC HYPERPLASIA WITH WEAK URINARY STREAM: ICD-10-CM

## 2021-06-04 DIAGNOSIS — N40.1 BENIGN PROSTATIC HYPERPLASIA WITH WEAK URINARY STREAM: ICD-10-CM

## 2021-06-04 DIAGNOSIS — E78.2 MIXED HYPERLIPIDEMIA: ICD-10-CM

## 2021-06-04 DIAGNOSIS — Z12.11 COLON CANCER SCREENING: ICD-10-CM

## 2021-06-04 PROCEDURE — 99214 OFFICE O/P EST MOD 30 MIN: CPT | Performed by: FAMILY MEDICINE

## 2021-06-04 RX ORDER — TAMSULOSIN HYDROCHLORIDE 0.4 MG/1
CAPSULE ORAL
Qty: 180 CAPSULE | Refills: 4 | Status: SHIPPED | OUTPATIENT
Start: 2021-06-04 | End: 2021-07-30

## 2021-06-04 RX ORDER — DICLOFENAC SODIUM 10 MG/G
2 GEL TOPICAL 4 TIMES DAILY
Qty: 100 G | Refills: 11 | Status: SHIPPED | OUTPATIENT
Start: 2021-06-04 | End: 2021-09-14

## 2021-06-04 NOTE — PROGRESS NOTES
Progress Note    he is a 77y.o. year old male who presents for evalution. Subjective:     Pt here for follow up and needs a refill of his flomax for BPH which has been working well somewhat but not well enough after discussion. Discussed increasing to 2 daily and he would like to trial this. .  He has b/l knee pain as well. Was given NSAID by ortho, does not remember what. Advised to not use PO nsaid given eliquis. Will trial voltaren gel. Had inj from ortho too and this helped 1.5 years ago. Does not want another injection currently. Fatigue sig better off toprol. He is also not taking lisinopril. He is overdue for labs. Needs a lipid panel and prostate cancer marker. History of prediabetes and is due for recheck as well. Patient also with varicose veins popliteal region left leg has been causing him pain. If he elevates his leg feels better. Has not been doing anything else for this. Reviewed PmHx, RxHx, FmHx, SocHx, AllgHx and updated and dated in the chart. Review of Systems - negative except as listed above in the HPI    Objective:     Vitals:    06/04/21 1554   BP: (!) 143/78   Pulse: 83   Resp: 18   Temp: 98.2 °F (36.8 °C)   TempSrc: Oral   SpO2: 96%   Weight: 207 lb (93.9 kg)   Height: 5' 11\" (1.803 m)       Current Outpatient Medications   Medication Sig    diclofenac (VOLTAREN) 1 % gel Apply 2 g to affected area four (4) times daily.  tamsulosin (FLOMAX) 0.4 mg capsule TAKE 2 CAPSULE BY MOUTH EVERY DAY    Eliquis 5 mg tablet TAKE 1 TABLET BY MOUTH TWICE A DAY    acetaminophen (TYLENOL) 500 mg tablet Take 1 Tab by mouth every four (4) hours as needed.  lisinopriL (PRINIVIL, ZESTRIL) 5 mg tablet TAKE 1 TABLET BY MOUTH EVERY DAY (Patient not taking: Reported on 6/4/2021)    tadalafiL (CIALIS) 5 mg tablet TAKE 1 TABLET BY MOUTH EVERY DAY (Patient not taking: Reported on 6/4/2021)     No current facility-administered medications for this visit.        Physical Examination: General appearance - alert, well appearing, and in no distress  Chest - clear to auscultation, no wheezes, rales or rhonchi, symmetric air entry  Heart - normal rate, regular rhythm, normal S1, S2, no murmurs, rubs, clicks or gallops    Left leg significant varicose veins posterior  Assessment/ Plan:   Diagnoses and all orders for this visit:    1. Chronic pain of both knees  -     diclofenac (VOLTAREN) 1 % gel; Apply 2 g to affected area four (4) times daily. 2. Pre-diabetes  -     HEMOGLOBIN A1C WITH EAG; Future    3. Mixed hyperlipidemia  -     HEPATIC FUNCTION PANEL; Future  -     LIPID PANEL; Future    4. Prostate cancer screening  -     PSA, DIAGNOSTIC (PROSTATE SPECIFIC AG); Future    5. Benign prostatic hyperplasia with weak urinary stream  -     tamsulosin (FLOMAX) 0.4 mg capsule; TAKE 2 CAPSULE BY MOUTH EVERY DAY    6. Colon cancer screening  -     REFERRAL TO GASTROENTEROLOGY    7. Varicose veins of leg with pain, left  Discussed use of elastic compression stockings as first-line therapy. He will trial these. Follow-up and Dispositions    · Return if symptoms worsen or fail to improve. I have discussed the diagnosis with the patient and the intended plan as seen in the above orders. The patient has received an after-visit summary and questions were answered concerning future plans. Pt conveyed understanding of plan.     Medication Side Effects and Warnings were discussed with patient    An electronic signature was used to authenticate this note  Lara Burris DO

## 2021-06-04 NOTE — PATIENT INSTRUCTIONS
A Healthy Lifestyle: Care Instructions Your Care Instructions A healthy lifestyle can help you feel good, stay at a healthy weight, and have plenty of energy for both work and play. A healthy lifestyle is something you can share with your whole family. A healthy lifestyle also can lower your risk for serious health problems, such as high blood pressure, heart disease, and diabetes. You can follow a few steps listed below to improve your health and the health of your family. Follow-up care is a key part of your treatment and safety. Be sure to make and go to all appointments, and call your doctor if you are having problems. It's also a good idea to know your test results and keep a list of the medicines you take. How can you care for yourself at home? · Do not eat too much sugar, fat, or fast foods. You can still have dessert and treats now and then. The goal is moderation. · Start small to improve your eating habits. Pay attention to portion sizes, drink less juice and soda pop, and eat more fruits and vegetables. ? Eat a healthy amount of food. A 3-ounce serving of meat, for example, is about the size of a deck of cards. Fill the rest of your plate with vegetables and whole grains. ? Limit the amount of soda and sports drinks you have every day. Drink more water when you are thirsty. ? Eat plenty of fruits and vegetables every day. Have an apple or some carrot sticks as an afternoon snack instead of a candy bar. Try to have fruits and/or vegetables at every meal. 
· Make exercise part of your daily routine. You may want to start with simple activities, such as walking, bicycling, or slow swimming. Try to be active 30 to 60 minutes every day. You do not need to do all 30 to 60 minutes all at once. For example, you can exercise 3 times a day for 10 or 20 minutes.  Moderate exercise is safe for most people, but it is always a good idea to talk to your doctor before starting an exercise program. 
· Keep moving. Ruth Houston the lawn, work in the garden, or [place]. Take the stairs instead of the elevator at work. · If you smoke, quit. People who smoke have an increased risk for heart attack, stroke, cancer, and other lung illnesses. Quitting is hard, but there are ways to boost your chance of quitting tobacco for good. ? Use nicotine gum, patches, or lozenges. ? Ask your doctor about stop-smoking programs and medicines. ? Keep trying. In addition to reducing your risk of diseases in the future, you will notice some benefits soon after you stop using tobacco. If you have shortness of breath or asthma symptoms, they will likely get better within a few weeks after you quit. · Limit how much alcohol you drink. Moderate amounts of alcohol (up to 2 drinks a day for men, 1 drink a day for women) are okay. But drinking too much can lead to liver problems, high blood pressure, and other health problems. Family health If you have a family, there are many things you can do together to improve your health. · Eat meals together as a family as often as possible. · Eat healthy foods. This includes fruits, vegetables, lean meats and dairy, and whole grains. · Include your family in your fitness plan. Most people think of activities such as jogging or tennis as the way to fitness, but there are many ways you and your family can be more active. Anything that makes you breathe hard and gets your heart pumping is exercise. Here are some tips: 
? Walk to do errands or to take your child to school or the bus. 
? Go for a family bike ride after dinner instead of watching TV. Where can you learn more? Go to http://www.gray.com/ Enter R582 in the search box to learn more about \"A Healthy Lifestyle: Care Instructions. \" Current as of: September 23, 2020               Content Version: 12.8 © 9356-2728 [employer].   
Care instructions adapted under license by [employer] (which disclaims liability or warranty for this information). If you have questions about a medical condition or this instruction, always ask your healthcare professional. [address] any warranty or liability for your use of this information.

## 2021-06-04 NOTE — PROGRESS NOTES
Chief Complaint   Patient presents with    Follow-up     Patient presents in office today for f/u. Has c/o a mole in the middle of his back that itches. Would like to have it looked at. Also has c/o knee pain. Treating with Tylenol with no relief. No other concerns. 1. Have you been to the ER, urgent care clinic since your last visit? Hospitalized since your last visit? No    2. Have you seen or consulted any other health care providers outside of the [address] [place] [address] since your last visit? Include any pap smears or colon screening.  No    Learning Assessment 12/13/2018   PRIMARY LEARNER Patient   HIGHEST LEVEL OF EDUCATION - PRIMARY LEARNER  -   BARRIERS PRIMARY LEARNER -   CO-LEARNER CAREGIVER -   PRIMARY LANGUAGE ENGLISH   LEARNER PREFERENCE PRIMARY DEMONSTRATION   ANSWERED BY patient   RELATIONSHIP SELF

## 2021-07-29 DIAGNOSIS — N40.1 BENIGN PROSTATIC HYPERPLASIA WITH WEAK URINARY STREAM: ICD-10-CM

## 2021-07-29 DIAGNOSIS — R39.12 BENIGN PROSTATIC HYPERPLASIA WITH WEAK URINARY STREAM: ICD-10-CM

## 2021-07-30 ENCOUNTER — TELEPHONE (OUTPATIENT)
Dept: FAMILY MEDICINE CLINIC | Age: 66
End: 2021-07-30

## 2021-07-30 DIAGNOSIS — N40.1 BENIGN PROSTATIC HYPERPLASIA WITH WEAK URINARY STREAM: ICD-10-CM

## 2021-07-30 DIAGNOSIS — R39.12 BENIGN PROSTATIC HYPERPLASIA WITH WEAK URINARY STREAM: ICD-10-CM

## 2021-07-30 RX ORDER — TAMSULOSIN HYDROCHLORIDE 0.4 MG/1
CAPSULE ORAL
Qty: 90 CAPSULE | Refills: 3 | Status: SHIPPED | OUTPATIENT
Start: 2021-07-30 | End: 2021-07-30

## 2021-07-30 RX ORDER — TAMSULOSIN HYDROCHLORIDE 0.4 MG/1
0.8 CAPSULE ORAL DAILY
Qty: 180 CAPSULE | Refills: 4 | Status: SHIPPED | OUTPATIENT
Start: 2021-07-30 | End: 2022-02-16 | Stop reason: SDUPTHER

## 2021-07-30 NOTE — TELEPHONE ENCOUNTER
Pt calling and states that was told at last visit to take his medication Flomax 2 pills a day. He states he asked the pharmacy for a refill and they told him it was denied and too soon to refill. He can be reached at [phone].

## 2021-07-30 NOTE — TELEPHONE ENCOUNTER
Called and spoke with patient. Advised Rx has been updated at pharmacy. Patient verbalized understanding.

## 2021-08-03 PROBLEM — I48.91 ATRIAL FIBRILLATION (HCC): Status: RESOLVED | Noted: 2018-12-03 | Resolved: 2021-08-03

## 2021-08-06 ENCOUNTER — TELEPHONE (OUTPATIENT)
Dept: CARDIOLOGY CLINIC | Age: 66
End: 2021-08-06

## 2021-08-06 NOTE — TELEPHONE ENCOUNTER
[facility] called because they faxed over a clearance for patient to be able to stop his Eliquis on 7/28/21 and has not received a response. Please give a call back or fax back.       Phone [phone]  Fax [phone]

## 2021-08-06 NOTE — TELEPHONE ENCOUNTER
Abigail Ortiz NP  You; Kaylee Romero RN 40 minutes ago (10:21 AM)   LP  Can hold 48 hours prior to procedure      Returned call to Thaddeus Cummings, patient ID verified using two patient identifiers. Patient is scheduled  For colonoscopy with Dr. Fransisca Nagel. Reynaldo Miller that per Nena Cardenas NP  Patient may hold his  Eliquis for 48 hours prior to his procedure. Thaddeus Cummings requesting letter be faxed to [phone] with Eliquis management instructions. Advised her that I would have letter sent today.

## 2021-08-13 NOTE — PROGRESS NOTES
[address]  Discharge- [facility] 1940, 78 y o  female MRN: [id number]  Unit/Bed#: -Ryan Encounter: [id number]  Primary Care Provider: Yarely Corado DO   Date and time admitted to hospital: 8/5/2021  4:02 AM    JONNY (obstructive sleep apnea)  Assessment & Plan  · Suspected JONNY she desaturates when she sleeps at night - will need an outpatient sleep study for now will do overnight pulse oximetry for pulse oximetry done overnight patient had 34 events less than 88% she requires 1 L of oxygen when she sleeps as discussed with the patient also will need outpatient sleep study lungs sound clear    Hyperlipidemia  Assessment & Plan  · Simvastatin substituted with pravastatin based on hospital formulary    Atrial fibrillation ([facility] )  Assessment & Plan  · Continue metoprolol  INR supratherapeutic on admission requiring vitamin K and Kcentra INR is subtherapeutic at 1 8  · She restarted Coumadin on August 10th she got 5 mg she is very sensitive to Coumadin as her INR ready is up to 1 9 with a lower dose of 2 5 in 3 INR has been steady at 1 9 outpatient full increased to 4 mg daily to have an INR repeated on Monday prior to further doses changes    Results from last 7 days   Lab Units 08/13/21  0515 08/12/21  0518 08/11/21  0439 08/10/21  0607 08/09/21  0604 08/08/21  0447 08/07/21  0547   INR  1 90* 1 91* 1 90* 1 84* 1 76* 1 94* 1 82*       SIRS (systemic inflammatory response syndrome) ([facility] )  Assessment & Plan  · Secondary to GI bleeding and hypotension  No infection  Resolved    * Hemorrhagic shock due to gastrointestinal bleeding  Assessment & Plan  · Melanotic stool on admission with acute blood loss anemia requiring 2 units PRBC  · Status post EGD with nonbleeding duodenal ulcers    · She is status post EGD and colonoscopy done on August 10th EGD showed no acute bleeding and ulcer is healing colonoscopy no acute bleeding but there is a large polyp that will need to be evaluated Cardiology Progress Note [address] [facility]. [address], [address] Phone [phone]; Fax [phone] 
 
 
 
2018 9:18 AM  
 
Admit Date:           2018 Admit Diagnosis:  Atrial fibrillation with RVR ([address] :          1955 MRN:          [id number] ASSESSMENT/RECOMMENDATION:  
Atrial fibrillation/flutter with rapid ventricular response: rate controlled on cardizem gtt at 15, d/c coreg and start toprol with goal to wean off gtt. BP stable 
-Unsuccessful cardioversion with low dose of 100 J. CHARLIE showed no DEVENDRA thrombus. DCCV yesterday with 360 J was not successful. 
- cont heparin gtt, [id number]=6 will need long term OAC 
-TSH WNL 
-K and Mag okay 
-aflutter ablation planned for Thursday Acute on chronic CHF NYHA class III, new onset: EF 25% per echo.  
-cardiac cath this afternoon 
-no GDMT right now d/t hypotension 
-flash pulmonary edema last night - on bipap - now High flow O2. Give another dose of IV lasix this AM 
 
Chest pain: resolved. -The pleuritic chest pain is of unclear etiology, suspect this is pericarditis. Would avoid NSAIDs in the setting of acute CHF/starting OAC. Can use Toradol.  
-chest CTA negative for aortic aneurysm or dissection. 
- Le dopplers negative Sleep apnea: Currently does not use CPAP for last many years. Need to encourage him to start using his CPAP again. 
-follow up with sleep clinic OP Reviewed above plan with the patient his significant other. Cardiology Attending: 
 
Patient personally seen and examined. All the elements of history and examination were personally performed. Assessment and plan was discussed and agree as written above. - Stop Cardizem gtt 
- Continue Toprol XL  
- Heparin to continue until Thursday till Flutter ablation thereafter Eliquis. - Start Digoxin for rate control. Fred James MD, [facility] [place] 
  
  
 
 
 
 
 
11/27 8844 - 11/27 1900 In: -  
Out: 200 [Urine:200] Last 3 Recorded Weights in this Encounter 11/25/18 1645 Weight: 200 lb (90.7 kg) 11/25 1901 - 11/27 0700 In: 2351.9 [I.V.:2351.9] Out: 8980 [Urine:2475] SUBJECTIVE Overnight events: flash pulmonary edema- IV lasix given and Bipap. On high flow O2 this AM 
 
 
Manav Reading III reports CP has resolved. Breathing better this morning s/p IV lasix. He thought he was going to die last night. He is able to lay flat on High caty O 2 Current Facility-Administered Medications Medication Dose Route Frequency  furosemide (LASIX) injection 40 mg  40 mg IntraVENous ONCE  
 metoprolol succinate (TOPROL-XL) XL tablet 50 mg  50 mg Oral DAILY  heparin 25,000 units in D5W 250 ml infusion  11-25 Units/kg/hr IntraVENous TITRATE  fentaNYL citrate (PF) injection  mcg   mcg IntraVENous Multiple  lidocaine (XYLOCAINE) 5 % ointment   Topical PRN  
 benzocaine (HURRICANE) 20 % spray   Mucous Membrane PRN  
 acetaminophen (TYLENOL) tablet 500 mg  500 mg Oral Q4H PRN  prochlorperazine (COMPAZINE) with saline injection 5 mg  5 mg IntraVENous Q6H PRN  
 sodium chloride (NS) flush 5-10 mL  5-10 mL IntraVENous Q8H  
 sodium chloride (NS) flush 5-10 mL  5-10 mL IntraVENous PRN  
 sodium chloride (NS) flush 5-10 mL  5-10 mL IntraVENous Q8H  
 sodium chloride (NS) flush 5-10 mL  5-10 mL IntraVENous PRN  
 dilTIAZem (CARDIZEM) 125 mg in dextrose 5% 125 mL infusion  15 mg/hr IntraVENous TITRATE OBJECTIVE Intake/Output Summary (Last 24 hours) at 11/27/2018 6971 Last data filed at 11/27/2018 6384 Gross per 24 hour Intake 724.26 ml Output 2075 ml Net -1350.74 ml Review of Systems - History obtained from the patient AS PER  HPI Telemetry Aflutter rate 90-100s PHYSICAL EXAM  
  
 
Visit Vitals as outpatient in 1 month follow-up GI to discuss elective colonoscopy for polypectomy and biopsy  Hemoglobin improved to 8 6 on iron supplements for absorption  She has more melenic stools and no evidence of bloody stools as well    Transfuse if less than 7 will discharge home will repeat labs on August 16 with ambulatory referral to Gastroenterology  Results from last 7 days   Lab Units 08/13/21  0515 08/12/21  0518 08/11/21  0439 08/10/21  1820 08/10/21  0607 08/09/21  0604 08/08/21  0447 08/07/21  0547 08/06/21  1954 08/06/21  1422   HEMOGLOBIN g/dL 8 6* 8 3* 7 9* 9 0* 7 7* 7 8* 7 6* 7 7* 8 0* 8 8*           Medical Problems     Resolved Problems  Date Reviewed: 8/13/2021        Resolved    Lactic acidosis 8/6/2021     Resolved by  Josh Nelson PA-C    Hypotension (arterial) 8/12/2021     Resolved by  Orma Phoenix, MD              Discharging Physician / Practitioner: Orma Phoenix, MD  PCP: Eloy Bradshaw DO  Admission Date:   Admission Orders (From admission, onward)     Ordered        08/05/21 0709  Inpatient Admission  Once                   Discharge Date: 08/13/21    Consultations During Hospital Stay:  · Gastroenterology  · Critical care service    Procedures Performed:   · 8/5 EGD- No active bleeding  Normal esophagus  3 cm hiatal hernia without Preet's erosions otherwise normal stomach on direct and retroflexed views; random biopsies taken to rule out H pylori  2 Jensen Class 3 ulcers(clean based ulcers) without evidence of active bleeding likely the source of her bleed  · 8/92-iekbztwlnhe-Ahyihrm polyp measuring smaller than 5 mm in the cecum  · Three sessile polyps measuring from 4 mm up to 8 mm in the transverse colon  · Pedunculated polyp measuring 10 mm or larger in the sigmoid colon  Few diverticula in the sigmoid colon  · EGD 8/9-No active bleeding  Hiatal hernia  Healing duodenal ulcers    Significant Findings / Test Results:   · See the EGD colonoscopy and repeats above  · Chest x-ray is /67 (BP 1 Location: Left arm, BP Patient Position: At rest) Pulse 94 Temp 100.3 °F (37.9 °C) Resp 22 Ht 5' 11\" (1.803 m) Wt 200 lb (90.7 kg) SpO2 91% BMI 27.89 kg/m² Gen: Well-developed, well-nourished, in no acute distress  alert and oriented x 3 HEENT:  Pink conjunctivae, Hearing grossly normal.No scleral icterus or conjunctival, moist mucous membranes Neck: Supple,+ JVD Resp: On high flow O2, fine crackles chavo bases Card: Irregular Rate,Rythm,No murmurs, rubs or gallop. No thrills. GI:          Mild firm and distended , non-tender MSK: No cyanosis or clubbing, good capillary refill Skin: No rashes or ulcers, no bruising Neuro:  Cranial nerves are grossly intact, moving all four extremities, no focal deficit, follows commands appropriately Psych:  Good insight, oriented to person, place and time, alert, Nml Affect LE: No edema DATA REVIEW Laboratory and Imaging have been reviewed by me and are notable for Recent Labs  
  11/26/18 
2148 11/26/18 
1530 11/26/18 
0343 11/25/18 
2224 CPK  --   --   --  39  
CKMB  --   --   --  <1.0  
TROIQ <0.05 <0.05 <0.05 <0.05 Recent Labs  
  11/27/18 
0410 11/26/18 
0343 11/25/18 
1658  138 139  
K 4.5 3.8 4.0  
CO2 24 23 26 BUN 12 11 14 CREA 1.09 1.04 1.10 * 102* 120* MG 2.1 2.4 2.1 WBC 12.6* 6.1 10.9 HGB 13.8 11.8* 14.5 HCT 43.4 37.6 45.4  149* 189 Shannan Pope NP 
                      
 
 
 negative    Incidental Findings:   · None     Test Results Pending at Discharge (will require follow up): · None     Outpatient Tests Requested:  · INR an H&H to be repeated on August 70UV    Complications:  None    Reason for Admission:  Vomiting    Hospital Course:   Annalise Oliveira is a 78 y o  female patient who originally presented to the hospital on 8/5/2021 due to a hemorrhagic shock secondary to GI bleed was admitted initially to critical care service with GI consultation requiring 2 units of PRBC transfusion  Eventually after the patient was hemodynamically stable she was transferred down to medical service  On August 5th EGD was done- there was a sliding hiatal hernia without [facility] lesions present biopsy to rule out H pylori was done multiple to superficial round ulcers in the 1st part of the duodenum with clean base there was also 2 4 is class 3 ulcer seen at the duodenal sweep with clear ulcer base there was no active bleeding, post that patient was placed on Protonix 40 mg p o  B i d  She needs to continue that for 3 months  Patient continued to have melenic stools and that is why also EGD and colonoscopy need to be repeated because the patient with atrial fibrillation needs to be restarted on Coumadin and also when she came in her Coumadin was supratherapeutic and she was given Kcentra  She had a EGD and colonoscopy performed on 08/10 EGD showing no active bleed and colonoscopy just showing there is a large polyp which will need to have a polypectomy with biopsy later on as an outpatient for which he will follow with GI  Has been cleared to restart her Coumadin now I did lower her Coumadin started she is very sensitive with a INR going up even on the lower dose on discharge her Coumadin was 1 90 she will be discharged on 4 mg daily with INR repeat on Monday to avoid supratherapeutic INR when she was on 7 5 mg daily    Her hemoglobin continue to improved she was placed on oral iron vitamin-C for absorption she is discharged with 8 6  Will have labs repeated with INR an H&H on 08/16 she has a referral to ambulatory Gastroenterology to see as an outpatient otherwise medically clear to be discharged home  Electrolytes normal kidney function normal chest x-ray negative  Also she has suspected obstructive sleep apnea she does desaturate when she sleeps at night into the 80s I did a pulse oximetry study she qualifies for 1 L of oxygen at night and discussed with her she needs to follow-up with PCP and discuss outpatient sleep study  Please see above list of diagnoses and related plan for additional information  Condition at Discharge: stable    Discharge Day Visit / Exam:   Subjective:  Patient seen and examined, patient denies any chest pain or shortness of breath denies any melenic or bloody stool denies any abdominal pain eating well  Vitals: Blood Pressure: 123/75 (08/13/21 0715)  Pulse: 86 (08/13/21 0715)  Temperature: 99 °F (37 2 °C) (08/13/21 0715)  Temp Source: Oral (08/11/21 1925)  Respirations: 18 (08/13/21 0715)  Height: 5' 4" (162 6 cm) (08/10/21 1157)  Weight - Scale: 93 4 kg (206 lb) (08/10/21 1157)  SpO2: 97 % (08/13/21 0715)  Exam:   Physical Exam  Vitals and nursing note reviewed  Constitutional:       General: She is not in acute distress  Appearance: She is well-developed  HENT:      Head: Normocephalic and atraumatic  Eyes:      Conjunctiva/sclera: Conjunctivae normal    Cardiovascular:      Rate and Rhythm: Normal rate  Rhythm irregular  Heart sounds: No murmur heard  Pulmonary:      Effort: Pulmonary effort is normal  No respiratory distress  Breath sounds: Normal breath sounds  No wheezing or rales  Abdominal:      Palpations: Abdomen is soft  Tenderness: There is no abdominal tenderness  Musculoskeletal:         General: Swelling (Mild chronic nonpitting) present  Cervical back: Neck supple  Skin:     General: Skin is warm and dry  Neurological:      General: No focal deficit present  Mental Status: She is alert and oriented to person, place, and time  Mental status is at baseline  Psychiatric:         Mood and Affect: Mood normal          Discussion with Family: Updated  (son) via phone  Discharge instructions/Information to patient and family:   See after visit summary for information provided to patient and family  Provisions for Follow-Up Care:  See after visit summary for information related to follow-up care and any pertinent home health orders  Disposition:   Home with VNA Services (Reminder: Complete face to face encounter)    Planned Readmission: no     Discharge Statement:  I spent >35 minutes discharging the patient  This time was spent on the day of discharge  I had direct contact with the patient on the day of discharge  Greater than 50% of the total time was spent examining patient, answering all patient questions, arranging and discussing plan of care with patient as well as directly providing post-discharge instructions  Additional time then spent on discharge activities  Discharge Medications:  See after visit summary for reconciled discharge medications provided to patient and/or family        **Please Note: This note may have been constructed using a voice recognition system**

## 2021-09-14 ENCOUNTER — OFFICE VISIT (OUTPATIENT)
Dept: SLEEP MEDICINE | Age: 66
End: 2021-09-14
Payer: COMMERCIAL

## 2021-09-14 VITALS
DIASTOLIC BLOOD PRESSURE: 82 MMHG | BODY MASS INDEX: 27.86 KG/M2 | SYSTOLIC BLOOD PRESSURE: 130 MMHG | WEIGHT: 199 LBS | OXYGEN SATURATION: 96 % | HEART RATE: 99 BPM | HEIGHT: 71 IN | RESPIRATION RATE: 16 BRPM

## 2021-09-14 DIAGNOSIS — I48.91 ATRIAL FIBRILLATION, CONTROLLED (HCC): ICD-10-CM

## 2021-09-14 DIAGNOSIS — G47.33 OSA (OBSTRUCTIVE SLEEP APNEA): Primary | ICD-10-CM

## 2021-09-14 PROCEDURE — 99213 OFFICE O/P EST LOW 20 MIN: CPT | Performed by: SPECIALIST

## 2021-09-14 RX ORDER — TRIAMCINOLONE ACETONIDE 1 MG/G
CREAM TOPICAL AS NEEDED
COMMUNITY
End: 2021-11-10

## 2021-09-14 NOTE — PROGRESS NOTES
[address] [facility]. [place], [address]  Tel.  [phone]  Fax. [address] [facility] [address]  [place], [address] [facility]  Tel.  [phone]  Fax. [phone] [address]   Tel.  [phone]  Fax. [phone]         Chief Complaint       Chief Complaint   Patient presents with    Follow-up     Pt needs a new cpap device          CRISTO Hawk is a 77 y.o. male seen for follow-up. He was seen in December 2018 providing a history of sleep apnea for which he was started on CPAP. His original unit had broken. He had lost 50 pounds and did not pursue replacing the unit or undergoing reevaluation. He was diagnosed with atrial fibrillation/atrial flutter for which he underwent an ablation procedure. He was told of apparent apnea when sedated. He was reevaluated with a home sleep test which demonstrated sleep disordered breathing characterized by an overall AHI of 18.7/h associated with arterial desaturations to 81%. Snoring during 6.5% of the recording. Events were primarily supine with the supinerelated AHI of 34.9/h. He was started on APAP 7-16 cm. He noted doing well with APAP. He did not follow-up as requested. He returns today noting the unit is not working consistently and making loud noises. Compliance data demonstrates AHI 0.4/h. Recent intermittent compliance corresponding to unit malfunction. No Known Allergies    Current Outpatient Medications   Medication Sig Dispense Refill    triamcinolone acetonide (KENALOG) 0.1 % topical cream Apply  to affected area as needed.  tamsulosin (FLOMAX) 0.4 mg capsule Take 2 Capsules by mouth daily. 180 Capsule 4    Eliquis 5 mg tablet TAKE 1 TABLET BY MOUTH TWICE A DAY (Patient taking differently: Take 5 mg by mouth daily. ) 180 Tab 2    acetaminophen (TYLENOL) 500 mg tablet Take 1 Tab by mouth every four (4) hours as needed.  (Patient taking differently: Take 500 mg by mouth as needed.) 30 Tab 0        He  has a past medical history of A-fib ([address] Anxiety, Atrial flutter ([address] BPH, Depression, ED (erectile dysfunction), Hearing loss, Heart failure ([address] Herniated disc, Hypogonadism, RAMIREZ on CPAP (2019), and Sleep apnea. He  has a past surgical history that includes echo stress; hx hernia repair; and hx orthopaedic. He family history includes Cancer in his mother and sister; Dementia in his father; Diabetes in his father; Hypertension in his father. He  reports that he quit smoking about 21 years ago. He has a 5.00 pack-year smoking history. He has never used smokeless tobacco. He reports current alcohol use of about 2.0 - 3.0 standard drinks of alcohol per week. He reports that he does not use drugs. Review of Systems:  Unchanged per patient      Objective:      General:   Conversant, cooperative   Eyes:  Pupils equal and reactive, no nystagmus   Oropharynx:   Mallampati score IV, tongue scalloped            Chest/Lungs:  Clear on auscultation    CVS:  Normal rate, regular rhythm        Neuro:  Speech fluent, face symmetrical             Assessment:       ICD-10-CM ICD-9-CM    1. RAMIREZ (obstructive sleep apnea)  G47.33 327.23 AMB SUPPLY ORDER   2. Atrial fibrillation, controlled ([address]  I48.91 427.31      History of sleep disordered breathing, severe when supine, responding to APAP 7 to 16 cm. Currently unit not working. Patient has history of atrial fibrillation. Importance of consistent sleep apnea treatment discussed. Repair/replace order entered for APAP 7-16 cm. Follow-up appointment with new unit will be scheduled. he had been compliant with PAP therapy and PAP continues to benefit patient and remains necessary for control of his sleep apnea. Plan:     Orders Placed This Encounter    AMB SUPPLY ORDER     Diagnosis: Obstructive Sleep Apnea ICD-10 Code (G47.33)     Positive Airway Pressure Therapy: Duration of need: 99 months.      ResMed APAP Device: Minimum Pressure: 7 cmH2O, Maximum Pressure: 16 cmH2O. Repair or Replace    CPAP mask -  Patient preference, headgear, heated tubing, and filter;  heated humidifier. Wireless modem. Remote monitoring enrollment.  Oral/Nasal Combo Mask 1 every 3 months.  Oral Cushion Combo Mask (Replace) 2 per month.  Nasal Pillows Combo Mask (Replace) 2 per month.  Full Face Mask 1 every 3 months.  Full Face Mask Cushion 1 per month.  Nasal Cushion (Replace) 2 per month.  Nasal Pillows (Replace) 2 per month.  Nasal Interface Mask 1 every 3 months.  Headgear 1 every 6 months.  Chinstrap 1 every 6 months.  Tubing 1 every 3 months.  Filter(s) Disposable 2 per month.  Filter(s) Non-Disposable 1 every 6 months.  Oral Interface 1 every 3 months. [address] for [employer] (Replace) 1 every 6 months.  Tubing with heating element 1 every 3 months.                 Dave Stacy MD, [facility]  Diplomate, American Board of Sleep Medicine  NPI [id number]  Electronically signed 9/14/21       *A copy of compliance data was provided to the patient and reviewed in detail. *CPAP will becontinued at the above pressure settings. The patient is to contact the office if there are problems with either mask or pressure settings. Follow-up will be scheduled at which time compliance data will be reviewed. * Patient has a history and examination consistent with the diagnosis of sleep apnea. * He was provided information on sleep apnea including corresponding risk factors and the importance of proper treatment. * Treatment options if indicated were reviewed today. Dave Stacy MD, [facility]  Electronically signed 09/14/21        This note was created using voice recognition software. Despite editing, there may be syntax errors. This note will not be viewable in [address]

## 2021-09-14 NOTE — PROGRESS NOTES
Room:     Identified pt with two pt identifiers(name and ). Reviewed record in preparation for visit and have obtained necessary documentation. All patient medications has been reviewed. Chief Complaint   Patient presents with    Follow-up     Pt needs a new cpap device        Health Maintenance Due   Topic    DTaP/Tdap/Td series (1 - Tdap)    Shingrix Vaccine Age 50> (1 of 2)    A1C test (Diabetic or Prediabetic)     Colorectal Cancer Screening Combo     AAA Screening 73-67 YO Male Smoking Patients     Pneumococcal 65+ years (1 of 1 - PPSV23)    Flu Vaccine (1)       Vitals:    21 1543   BP: 130/82   Pulse: 99   Resp: 16   SpO2: 96%   Weight: 199 lb (90.3 kg)   Height: 5' 11\" (1.803 m)   PainSc:   0 - No pain         Patient is accompanied by self I have received verbal consent from Siena Radford III to discuss any/all medical information while they are present in the room.

## 2021-09-16 ENCOUNTER — DOCUMENTATION ONLY (OUTPATIENT)
Dept: SLEEP MEDICINE | Age: 66
End: 2021-09-16

## 2021-10-18 DIAGNOSIS — I48.91 ATRIAL FIBRILLATION, UNSPECIFIED TYPE (HCC): ICD-10-CM

## 2021-10-18 RX ORDER — APIXABAN 5 MG/1
TABLET, FILM COATED ORAL
Qty: 180 TABLET | Refills: 2 | Status: SHIPPED | OUTPATIENT
Start: 2021-10-18

## 2021-11-10 ENCOUNTER — OFFICE VISIT (OUTPATIENT)
Dept: CARDIOLOGY CLINIC | Age: 66
End: 2021-11-10
Payer: COMMERCIAL

## 2021-11-10 VITALS
SYSTOLIC BLOOD PRESSURE: 128 MMHG | BODY MASS INDEX: 27.86 KG/M2 | HEIGHT: 71 IN | DIASTOLIC BLOOD PRESSURE: 84 MMHG | HEART RATE: 84 BPM | WEIGHT: 199 LBS | OXYGEN SATURATION: 99 %

## 2021-11-10 DIAGNOSIS — I48.91 ATRIAL FIBRILLATION, UNSPECIFIED TYPE (HCC): Primary | ICD-10-CM

## 2021-11-10 PROCEDURE — 99214 OFFICE O/P EST MOD 30 MIN: CPT | Performed by: INTERNAL MEDICINE

## 2021-11-10 RX ORDER — ACETAMINOPHEN 500 MG
500 TABLET ORAL
Qty: 30 TABLET | Refills: 6
Start: 2021-11-10

## 2021-11-10 NOTE — PROGRESS NOTES
HISTORY OF PRESENTING ILLNESS      Rozina Falcon III is a 74 y.o. male with persistent atrial fibrillation/atrial flutter who underwent AF and atrial flutter ablation now presenting for follow-up. His EF in 11/2018 was 20%. Cardiac catheterization at that time was negative for CAD. Post ablation his EF improved to 40-45%. He believed he was fatigued on the beta blocker. We discontinued his beta-blocker to monitor for improvement in fatigue with plan to start diltiazem for increase in frequency of palpitations. He continued to experience of fatigue while off metoprolol particularly with exertion. Echocardiogram in 8/2020 demonstrated preserved LV function with an EF of 60%. Continued to experience occasional though rare palpitations that were consistent with the symptoms he experienced/reported during PVCs in the past. Fatigue remained of unclear etiology. Stress testing was negative for inducible ischemia and achieved a target heart rate of 153 bpm representing a 90% achievement of target heart rate. Last visit we held lisinopril in an attempt to improve fatigue. Fatigue has resolved and denied symptomatic recurrence.         PAST MEDICAL HISTORY     Past Medical History:   Diagnosis Date    A-fib [facility])     Anxiety     Atrial flutter ([facility]     BPH     Depression     ED (erectile dysfunction)     Hearing loss     Heart failure ([facility] [address]     11/25/18 with afib RVR    Herniated disc     Hypogonadism     RAMIREZ on CPAP 2019    Sleep apnea            PAST SURGICAL HISTORY     Past Surgical History:   Procedure Laterality Date    ECHO STRESS      HX HERNIA REPAIR      hernia repair \"20 years ago\"    HX ORTHOPAEDIC      L5-S1 surgery (non fusion)          ALLERGIES     No Known Allergies       FAMILY HISTORY     Family History   Problem Relation Age of Onset    Cancer Mother     Diabetes Father     Hypertension Father     Dementia Father     Cancer Sister     negative for cardiac disease SOCIAL HISTORY     Social History     Socioeconomic History    Marital status: LIFE PARTNER    Number of children: 3    Years of education: 1 year of college   Occupational History    Occupation: Sales   Tobacco Use    Smoking status: Former Smoker     Packs/day: 0.50     Years: 10.00     Pack years: 5.00     Quit date: 2000     Years since quittin.2    Smokeless tobacco: Never Used   Vaping Use    Vaping Use: Never used   Substance and Sexual Activity    Alcohol use: Yes     Alcohol/week: 2.0 - 3.0 standard drinks     Types: 2 - 3 Cans of beer per week     Comment: beer/wine    Drug use: No    Sexual activity: Yes   Other Topics Concern     Service No    Blood Transfusions No    Caffeine Concern No    Occupational Exposure No    Hobby Hazards No    Sleep Concern No    Stress Concern No    Weight Concern No    Special Diet No    Back Care No    Exercise No    Bike Helmet No    Seat Belt No    Self-Exams No         MEDICATIONS     Current Outpatient Medications   Medication Sig    Eliquis 5 mg tablet TAKE 1 TABLET BY MOUTH TWICE A DAY    triamcinolone acetonide (KENALOG) 0.1 % topical cream Apply  to affected area as needed.  tamsulosin (FLOMAX) 0.4 mg capsule Take 2 Capsules by mouth daily.  acetaminophen (TYLENOL) 500 mg tablet Take 1 Tab by mouth every four (4) hours as needed. (Patient taking differently: Take 500 mg by mouth as needed.)     No current facility-administered medications for this visit. I have reviewed the nurses notes, vitals, problem list, allergy list, medical history, family, social history and medications. REVIEW OF SYMPTOMS      General: Pt denies excessive weight gain or loss. Pt is able to conduct ADL's  HEENT: Denies blurred vision, headaches, hearing loss, epistaxis and difficulty swallowing. Respiratory: Denies cough, congestion, shortness of breath, MATHEW, wheezing or stridor.   Cardiovascular: Denies precordial pain, palpitations, edema or PND  Gastrointestinal: Denies poor appetite, indigestion, abdominal pain or blood in stool  Genitourinary: Denies hematuria, dysuria, increased urinary frequency  Musculoskeletal: Denies joint pain or swelling from muscles or joints  Neurologic: Denies tremor, paresthesias, headache, or sensory motor disturbance  Psychiatric: Denies confusion, insomnia, depression  Integumentray: Denies rash, itching or ulcers. Hematologic: Denies easy bruising, bleeding       PHYSICAL EXAMINATION      Vitals: see vitals section  General: Well developed, in no acute distress. HEENT: No jaundice, oral mucosa moist, no oral ulcers  Neck: Supple, no stiffness, no lymphadenopathy, supple  Heart:  Normal S1/S2 negative S3 or S4. Regular, no murmur, gallop or rub, no jugular venous distention  Respiratory: Clear bilaterally x 4, no wheezing or rales  Abdomen:   Soft, non-tender, bowel sounds are active. Extremities:  No edema, normal cap refill, no cyanosis. Musculoskeletal: No clubbing, no deformities  Neuro: A&Ox3, speech clear, gait stable, cooperative, no focal neurologic deficits  Skin: Skin color is normal. No rashes or lesions.  Non diaphoretic, moist.  Vascular: 2+ pulses symmetric in all extremities       DIAGNOSTIC DATA      EKG:        LABORATORY DATA      Lab Results   Component Value Date/Time    WBC 5.9 03/17/2021 09:12 AM    HGB 14.8 03/17/2021 09:12 AM    HCT 46.6 03/17/2021 09:12 AM    PLATELET 760 22/86/9627 09:12 AM    MCV 86.9 03/17/2021 09:12 AM      Lab Results   Component Value Date/Time    Sodium 138 03/17/2021 09:12 AM    Potassium 4.1 03/17/2021 09:12 AM    Chloride 105 03/17/2021 09:12 AM    CO2 28 03/17/2021 09:12 AM    Anion gap 5 03/17/2021 09:12 AM    Glucose 100 03/17/2021 09:12 AM    BUN 14 03/17/2021 09:12 AM    Creatinine 1.03 03/17/2021 09:12 AM    BUN/Creatinine ratio 14 03/17/2021 09:12 AM    GFR est AA >60 03/17/2021 09:12 AM    GFR est non-AA >60 03/17/2021 09:12 AM Calcium 9.0 03/17/2021 09:12 AM    Bilirubin, total 1.1 (H) 11/25/2018 04:58 PM    Alk. phosphatase 54 11/25/2018 04:58 PM    Protein, total 7.2 11/25/2018 04:58 PM    Albumin 3.7 11/25/2018 04:58 PM    Globulin 3.5 11/25/2018 04:58 PM    A-G Ratio 1.1 11/25/2018 04:58 PM    ALT (SGPT) 67 11/25/2018 04:58 PM           ASSESSMENT      1. Atrial fibrillation             A.  Persistent              B. Failed cardioversion x2             C. PVI 12/2018  2. Atrial flutter                         A. AFL ablation 12/2018  3. Chest pain  4. Cardiomyopathy  5. RAMIREZ  6. Pericarditis-resolved  7. Fatigue  8. Erectile dysfunction         PLAN     Continue monitoring clinically. Encouraged obtaining functioning CPAP machine. FOLLOW-UP     1 year with Norm Reagan      Thank you, Crispin Cash DO and Dr. Stephany Angel for allowing me to participate in the care of this extraordinarily pleasant male. Please do not hesitate to contact me for further questions/concerns.          Ranulfo Gonzalez MD  Cardiac Electrophysiology / Cardiology    [address]  [address], [address] [facility], [address] [place] [address], [address] [place] [address]  [phone] / [phone] Fax   [phone] / [phone] Fax

## 2021-11-10 NOTE — PROGRESS NOTES
Room EP2    -vericose vein in left leg, vascular sugg a scan, either strip or ? Currently wearing a stocking. Anything to do with heart?     Visit Vitals  /84 (BP 1 Location: Left upper arm, BP Patient Position: Sitting, BP Cuff Size: Adult)   Pulse 84   Ht 5' 11\" (1.803 m)   Wt 199 lb (90.3 kg)   SpO2 99%   BMI 27.75 kg/m²       Chest pain: comes and goes   Shortness of breath: no  Edema: no  Palpitations, Skipped beats, Rapid heartbeat: YES  Dizziness: no    New diagnosis/Surgeries: Above    ER/Hospitalizations: no    Refills: no

## 2022-01-17 ENCOUNTER — DOCUMENTATION ONLY (OUTPATIENT)
Dept: SLEEP MEDICINE | Age: 67
End: 2022-01-17

## 2022-01-17 NOTE — PROGRESS NOTES
Patient LVM wanting to switch DME. LVM informing pt I would switch him to [facility] and I also left their phone number for him to call in a week if he has not heard anything from them. I informed him to call our office for any questions or concerns otherwise and to schedule 1st adherence with our office. Faxed PAP order to medical equipment company.

## 2022-02-16 ENCOUNTER — TELEPHONE (OUTPATIENT)
Dept: FAMILY MEDICINE CLINIC | Age: 67
End: 2022-02-16

## 2022-02-16 DIAGNOSIS — R39.12 BENIGN PROSTATIC HYPERPLASIA WITH WEAK URINARY STREAM: ICD-10-CM

## 2022-02-16 DIAGNOSIS — N40.1 BENIGN PROSTATIC HYPERPLASIA WITH WEAK URINARY STREAM: ICD-10-CM

## 2022-02-16 RX ORDER — TAMSULOSIN HYDROCHLORIDE 0.4 MG/1
0.8 CAPSULE ORAL DAILY
Qty: 180 CAPSULE | Refills: 4 | Status: SHIPPED | OUTPATIENT
Start: 2022-02-16

## 2022-02-16 NOTE — TELEPHONE ENCOUNTER
PT called requesting that a new script is sent to his pharmacy on file ([facility]), he stated that his Flomax was increased to 2 times daily so he is out of his meds and [facility] will not refill without new script.     CB# is [phone]

## 2022-03-19 PROBLEM — D48.1 ABDOMINAL FIBROMATOSIS: Status: ACTIVE | Noted: 2018-12-03

## 2022-03-19 PROBLEM — D48.19 ABDOMINAL FIBROMATOSIS: Status: ACTIVE | Noted: 2018-12-03

## 2022-03-20 PROBLEM — I48.91 ATRIAL FIBRILLATION WITH RVR (HCC): Status: ACTIVE | Noted: 2018-11-25

## 2022-11-03 PROBLEM — I48.92 ATRIAL FLUTTER (HCC): Status: ACTIVE | Noted: 2022-11-03

## 2022-11-03 PROBLEM — Z86.79 S/P ABLATION OF ATRIAL FIBRILLATION: Status: ACTIVE | Noted: 2022-11-03

## 2022-11-03 PROBLEM — I42.8 NONISCHEMIC CARDIOMYOPATHY (HCC): Status: ACTIVE | Noted: 2022-11-03

## 2022-11-03 PROBLEM — Z79.01 ANTICOAGULATED: Status: ACTIVE | Noted: 2022-11-03

## 2022-11-03 PROBLEM — Z98.890 S/P ABLATION OF ATRIAL FIBRILLATION: Status: ACTIVE | Noted: 2022-11-03

## 2022-11-03 NOTE — PROGRESS NOTES
Cardiac Electrophysiology Office Follow-up    NAME:  Pawan Inman III   :  1955  MRM:  [id number]    Date:  2022         Assessment and Plan:     1. Atrial fibrillation with RVR (HCC)  -     AMB POC EKG ROUTINE W/ 12 LEADS, INTER & REP  -     METABOLIC PANEL, BASIC; Future  -     MAGNESIUM; Future  -     CBC W/O DIFF; Future  -     TSH 3RD GENERATION; Future  2. Atrial flutter, unspecified type (HCC)  -     AMB POC EKG ROUTINE W/ 12 LEADS, INTER & REP  3. S/P ablation of atrial fibrillation  -     AMB POC EKG ROUTINE W/ 12 LEADS, INTER & REP  4. Nonischemic cardiomyopathy (HCC)  -     AMB POC EKG ROUTINE W/ 12 LEADS, INTER & REP  5. Anticoagulated  -     AMB POC EKG ROUTINE W/ 12 LEADS, INTER & REP       Paroxysmal atrial fibrillation/flutter. - S/p ablation in 2018 with Dr. Kiara Carter.   - No documented recurrence. Symptomatic PVCs. - Previously on diltiazem. Nonischemic cardiomyopathy.   - Likely tachy-induced. - EF 60% by echo 2020. Anticoagulation.   - Continue Eliquis. - Denies of any bleeding issues. Know to contact clinic with any bleeding side effects (BRBPR, melena, hemotysis, hematuria). Resume diltiazem 120 mg daily for PVCs. Will have him get labs to assess for electrolyte or thyroid abnormalities. Also will check for anemia on Eliquis. They were previously controlled on this. We will have him wear a one week monitor to assess for AF recurrence. Follow-up in 3 months when he returns from [place]. Subjective: Sindy Parra, a 69y.o. year-old who presents for followup of AF. He had persistent atrial fibrillation/atrial flutter and underwent AF and atrial flutter ablation in . His EF in 2018 was 20%. Cardiac catheterization at that time was negative for CAD. Post ablation his EF improved to 40-45%. Echocardiogram in 2020 demonstrated preserved LV function with an EF of 60%.      He reports palpitations associated with slight pinpoint chest pressure that lasts only seconds. While auscultating I was able to hear a PVC and he verified this was the symptom he was referring to. He walks his dogs daily without chest discomfort or dyspnea. He has some lightheadedness with quick head turns and we were able to reproduce this in the office today. He takes Eliquis without any bleeding issues. Review of Systems:   12 point review of systems was performed. All negative except for HPI    Exam:     Physical Exam:  /74 (BP 1 Location: Right upper arm, BP Patient Position: Sitting, BP Cuff Size: Large adult) Comment: BP rechecked  Pulse 72   Resp 16   Ht 5' 11\" (1.803 m)   Wt 211 lb 9.6 oz (96 kg)   SpO2 98%   BMI 29.51 kg/m²     PHYSICAL EXAM  General:  Alert and oriented, in no acute distress  Head:  Atraumatic, normocephalic  Eyes:  extraocular muscles intact  Neck:  Supple, normal range of motion  Lungs:  Clear to auscultation bilaterally, no wheezes/rales/rhonchi   Cardiovascular:  Regular rate and rhythm, normal S1-S2, no murmurs/rubs/gallops; rare ectopy  Abdomen:  Soft, nontender, nondistended, normoactive bowel sounds  Skin:  Intact, no rash  Extremities:, no clubbing, cyanosis, or edema  Musculoskeletal: normal range of motion  Neurological:  Alert and oriented, no focal neurologic deficits  Psychiatric:  Normal mood and affect      Medications:     Current Outpatient Medications   Medication Sig    SAW PALMETTO PO Take 2 Capsules by mouth daily. dilTIAZem ER (CARDIZEM CD) 120 mg capsule Take 1 Capsule by mouth daily for 90 days. tamsulosin (FLOMAX) 0.4 mg capsule Take 2 Capsules by mouth daily. acetaminophen (TYLENOL) 500 mg tablet Take 1 Tablet by mouth every six (6) hours as needed for Pain. Eliquis 5 mg tablet TAKE 1 TABLET BY MOUTH TWICE A DAY     No current facility-administered medications for this visit.       Diagnostic Data Review:       Results for orders placed or performed during the hospital encounter of 11/25/18   EKG, 12 LEAD, INITIAL   Result Value Ref Range    Ventricular Rate 141 BPM    Atrial Rate 138 BPM    QRS Duration 74 ms    Q-T Interval 322 ms    QTC Calculation (Bezet) 493 ms    Calculated R Axis 3 degrees    Calculated T Axis -34 degrees    Diagnosis       Atrial fibrillation with rapid ventricular response  Low voltage QRS  Possible Inferior infarct , age undetermined  Abnormal ECG  When compared with ECG of 25-NOV-2018 18:49,  Nonspecific T wave abnormality no longer evident in Anterolateral leads  Confirmed by Miguel Lopez MD., Estefania ([id number]) on 11/26/2018 12:06:56 PM        Cardiac cath 11/27/18 LM- Normal  LAD- Normal.  LCX- Normal.  RCA- Normal. LVEDP- 2    Echo 11/2018 : LVEF 20 %. Severe diffuse hypokinesis. Raleigh Raker. Mild MR  12/4/18:Atrial fibrillation ablation/typical atrial flutter ablation performed per Dr. Kristin Parks    Echo 4/10/19: LVEF 45%. Mild (grade 1) left ventricular diastolic dysfunction. ___________________________________________________    Joy Gray NP    Cardiac Electrophysiology  [facility] [place]  [address] [facility].  [address] [place] [address], [address] [place] [address] [phone]

## 2022-11-18 ENCOUNTER — TELEPHONE (OUTPATIENT)
Dept: CARDIOLOGY CLINIC | Age: 67
End: 2022-11-18

## 2022-11-18 ENCOUNTER — OFFICE VISIT (OUTPATIENT)
Dept: CARDIOLOGY CLINIC | Age: 67
End: 2022-11-18
Payer: COMMERCIAL

## 2022-11-18 VITALS
HEIGHT: 71 IN | WEIGHT: 211.6 LBS | BODY MASS INDEX: 29.62 KG/M2 | SYSTOLIC BLOOD PRESSURE: 130 MMHG | OXYGEN SATURATION: 98 % | HEART RATE: 72 BPM | RESPIRATION RATE: 16 BRPM | DIASTOLIC BLOOD PRESSURE: 74 MMHG

## 2022-11-18 DIAGNOSIS — I48.91 ATRIAL FIBRILLATION WITH RVR (HCC): ICD-10-CM

## 2022-11-18 DIAGNOSIS — I48.92 ATRIAL FLUTTER, UNSPECIFIED TYPE (HCC): ICD-10-CM

## 2022-11-18 DIAGNOSIS — Z86.79 S/P ABLATION OF ATRIAL FIBRILLATION: ICD-10-CM

## 2022-11-18 DIAGNOSIS — I42.8 NONISCHEMIC CARDIOMYOPATHY (HCC): ICD-10-CM

## 2022-11-18 DIAGNOSIS — Z79.01 ANTICOAGULATED: ICD-10-CM

## 2022-11-18 DIAGNOSIS — Z98.890 S/P ABLATION OF ATRIAL FIBRILLATION: ICD-10-CM

## 2022-11-18 PROCEDURE — 93000 ELECTROCARDIOGRAM COMPLETE: CPT | Performed by: NURSE PRACTITIONER

## 2022-11-18 PROCEDURE — 1123F ACP DISCUSS/DSCN MKR DOCD: CPT | Performed by: NURSE PRACTITIONER

## 2022-11-18 PROCEDURE — 99214 OFFICE O/P EST MOD 30 MIN: CPT | Performed by: NURSE PRACTITIONER

## 2022-11-18 RX ORDER — DILTIAZEM HYDROCHLORIDE 120 MG/1
120 CAPSULE, COATED, EXTENDED RELEASE ORAL DAILY
Qty: 90 CAPSULE | Refills: 0 | Status: SHIPPED | OUTPATIENT
Start: 2022-11-18 | End: 2023-02-16

## 2022-11-18 NOTE — PATIENT INSTRUCTIONS
- Start diltiazem 120 mg daily    - Monitor for one week to assess palpitations     - Blood work to assess electrolytes    - Follow-up 3 months

## 2022-11-18 NOTE — TELEPHONE ENCOUNTER
Enrolled with [employer] - Ordered and being shipped to patient's home address on file. ETA within 5-7 business days. Message  Received: Today  MELINA Orozco  Please mail one week monitor dx: AF, PVCs. Thanks!

## 2022-11-18 NOTE — PROGRESS NOTES
Room EP 2    Chief Complaint   Patient presents with    Irregular Heart Beat    Cardiomyopathy       Visit Vitals  /74 (BP 1 Location: Right upper arm, BP Patient Position: Sitting, BP Cuff Size: Large adult) Comment: BP rechecked   Pulse 72   Resp 16   Ht 5' 11\" (1.803 m)   Wt 211 lb 9.6 oz (96 kg)   SpO2 98%   BMI 29.51 kg/m²       EKG done today    Chest pain: yes, chest tightness    Shortness of breath: yes, with exertion    Edema: no    Palpitations, Skipped beats, Rapid heartbeat: yes    Dizziness: lightheadedness in the last month or two    Fatigue:yes    New diagnosis/Surgeries: no    1. Have you been to the ER, urgent care clinic since your last visit? Hospitalized since your last visit? No      2. Have you seen or consulted any other health care providers outside of the [address] [place] [address] since your last visit? Include any pap smears or colon screening.  No     Refills: no

## 2022-11-21 ENCOUNTER — TELEPHONE (OUTPATIENT)
Dept: CARDIOLOGY CLINIC | Age: 67
End: 2022-11-21

## 2022-11-21 NOTE — TELEPHONE ENCOUNTER
----- Message from Elmer Alcantara NP sent at 11/18/2022  3:48 PM EST -----  Please call the patient. Labs unremarkable. Called patient, no answer. Unable to leave message, phone just rings, no VM given. Results letter sent to patient's home address.

## 2022-12-23 ENCOUNTER — TELEPHONE (OUTPATIENT)
Dept: CARDIOLOGY CLINIC | Age: 67
End: 2022-12-23

## 2022-12-23 NOTE — TELEPHONE ENCOUNTER
Returned patient call, ID verified using two patient identifiers. Patient calling to see if we have the results for the 7 day heart monitor he wore. Advised patient that the monitor is on Dr. Teresa Villela desk for review and he is currently out of the office until the middle of next week. Upon quick review there was no AFIB. PAC/PVC burden was <1%. Tachy episodes were <3%. Advised patient that once Dr. Maikel Barba reviews I will call him back if he has any other information or recommendations. Patient verbalized understanding and will call with any other questions.

## 2022-12-23 NOTE — TELEPHONE ENCOUNTER
Patient is calling because he is looking for his results from his heart monitor that he had to wear for 7 days back in 11/2023    [phone]

## 2022-12-27 PROBLEM — I49.3 PVC (PREMATURE VENTRICULAR CONTRACTION): Status: ACTIVE | Noted: 2022-12-27

## 2022-12-27 NOTE — PROGRESS NOTES
Cardiac Electrophysiology Office Follow-up    NAME:  Starla Martins III   :  1955  MRM:  [id number]    Date:  2/3/2023       Assessment and Plan:     1. PVC (premature ventricular contraction)  2. S/P ablation of atrial fibrillation  3. Atrial fibrillation with RVR ([address]  4. Anticoagulated  5. Obstructive sleep apnea syndrome       Paroxysmal atrial fibrillation/flutter. - S/p ablation in 2018 with Dr. Denise Barrera.   - No documented recurrence. - No AF on 1 week monitor 2022. Symptomatic PVCs. - Improved on diltiazem. Continue current dose. - Less than 0.1% burden on 1 week monitor 2022. Nonischemic cardiomyopathy.   - Likely tachy-induced. - EF 60% by echo 2020. Anticoagulation.   - Continue Eliquis 5 mg BID for CVA prophylaxis. - Denies of any bleeding issues. Know to contact clinic with any bleeding side effects (BRBPR, melena, hemotysis, hematuria). RAMIREZ. - Continue CPAP. Follow-up in the EP clinic in 18 months, or sooner for any concerns. Subjective: Jose Vu, a 69y.o. year-old who presents for followup of AF. He had persistent atrial fibrillation/atrial flutter and underwent AF and atrial flutter ablation in . His EF in 2018 was 20%. Cardiac catheterization at that time was negative for CAD. Post ablation his EF improved to 40-45%. Echocardiogram in 2020 demonstrated preserved LV function with an EF of 60%. Several months ago he noticed increased symptomatic PVCs. He was started on diltiazem and feels quite well. He wore a one week monitor 2022 which showed sinus rhythm. Avg HR 79, range . Rare PVCs, burden less than 0.1%. No AF. No abnormal bradyarrhythmias or tachyarrhythmias detected. Review of Systems:   12 point review of systems was performed.  All negative except for HPI    Exam:     Physical Exam:  /74 (BP 1 Location: Left upper arm, BP Patient Position: Sitting, BP Cuff Size: Large adult)   Pulse 82   Resp 16   Ht 5' 11\" (1.803 m)   Wt 96.3 kg (212 lb 3.2 oz)   SpO2 98%   BMI 29.60 kg/m²     PHYSICAL EXAM  General:  Alert and oriented, in no acute distress  Head:  Atraumatic, normocephalic  Eyes:  extraocular muscles intact  Neck:  Supple, normal range of motion  Lungs:  Clear to auscultation bilaterally, no wheezes/rales/rhonchi   Cardiovascular:  Regular rate and rhythm, normal S1-S2, no murmurs/rubs/gallops; rare ectopy  Abdomen:  Soft, nontender, nondistended, normoactive bowel sounds  Skin:  Intact, no rash  Extremities:, no clubbing, cyanosis, or edema  Musculoskeletal: normal range of motion  Neurological:  Alert and oriented, no focal neurologic deficits  Psychiatric:  Normal mood and affect      Medications:     Current Outpatient Medications   Medication Sig    SAW PALMETTO PO Take 2 Capsules by mouth daily. dilTIAZem ER (CARDIZEM CD) 120 mg capsule Take 1 Capsule by mouth daily for 90 days. tamsulosin (FLOMAX) 0.4 mg capsule Take 2 Capsules by mouth daily. acetaminophen (TYLENOL) 500 mg tablet Take 1 Tablet by mouth every six (6) hours as needed for Pain. Eliquis 5 mg tablet TAKE 1 TABLET BY MOUTH TWICE A DAY     No current facility-administered medications for this visit.       Diagnostic Data Review:       Results for orders placed or performed during the hospital encounter of 11/25/18   EKG, 12 LEAD, INITIAL   Result Value Ref Range    Ventricular Rate 141 BPM    Atrial Rate 138 BPM    QRS Duration 74 ms    Q-T Interval 322 ms    QTC Calculation (Bezet) 493 ms    Calculated R Axis 3 degrees    Calculated T Axis -34 degrees    Diagnosis       Atrial fibrillation with rapid ventricular response  Low voltage QRS  Possible Inferior infarct , age undetermined  Abnormal ECG  When compared with ECG of 25-NOV-2018 18:49,  Nonspecific T wave abnormality no longer evident in Anterolateral leads  Confirmed by Jeff Bowens MD., Estefania ([id number]) on 11/26/2018 12:06:56 PM Cardiac cath 11/27/18 LM- Normal  LAD- Normal.  LCX- Normal.  RCA- Normal. LVEDP- 2    Echo 11/2018 : LVEF 20 %. Severe diffuse hypokinesis. Raciel Hemphill. Mild MR  12/4/18:Atrial fibrillation ablation/typical atrial flutter ablation performed per Dr. Wojciech Vann    Echo 4/10/19: LVEF 45%. Mild (grade 1) left ventricular diastolic dysfunction. ___________________________________________________    Estefany Schmidt NP    Cardiac Electrophysiology  [facility] [place]  [facility] [facility].  [address] [facility], [address] [place] [address] [phone]

## 2023-01-03 ENCOUNTER — TELEPHONE (OUTPATIENT)
Dept: CARDIOLOGY CLINIC | Age: 68
End: 2023-01-03

## 2023-01-03 ENCOUNTER — DOCUMENTATION ONLY (OUTPATIENT)
Dept: CARDIOLOGY CLINIC | Age: 68
End: 2023-01-03

## 2023-01-03 NOTE — PROGRESS NOTES
1 week event monitor (11/21-11/29/22)    Predominant sinus rhythm with heart rate ranging between  bpm, average heart rate of 79 bpm.   No atrial fibrillation detected  Rare PACs/PVCs with a burden of less than 0.1%  No abnormal bradyarrhythmias or tachyarrhythmias detected  No patient triggered events recorded

## 2023-02-03 ENCOUNTER — OFFICE VISIT (OUTPATIENT)
Dept: CARDIOLOGY CLINIC | Age: 68
End: 2023-02-03
Payer: COMMERCIAL

## 2023-02-03 VITALS
RESPIRATION RATE: 16 BRPM | HEART RATE: 82 BPM | BODY MASS INDEX: 29.71 KG/M2 | DIASTOLIC BLOOD PRESSURE: 74 MMHG | HEIGHT: 71 IN | OXYGEN SATURATION: 98 % | SYSTOLIC BLOOD PRESSURE: 128 MMHG | WEIGHT: 212.2 LBS

## 2023-02-03 DIAGNOSIS — Z79.01 ANTICOAGULATED: ICD-10-CM

## 2023-02-03 DIAGNOSIS — G47.33 OBSTRUCTIVE SLEEP APNEA SYNDROME: ICD-10-CM

## 2023-02-03 DIAGNOSIS — Z86.79 S/P ABLATION OF ATRIAL FIBRILLATION: ICD-10-CM

## 2023-02-03 DIAGNOSIS — I49.3 PVC (PREMATURE VENTRICULAR CONTRACTION): Primary | ICD-10-CM

## 2023-02-03 DIAGNOSIS — Z98.890 S/P ABLATION OF ATRIAL FIBRILLATION: ICD-10-CM

## 2023-02-03 DIAGNOSIS — I48.91 ATRIAL FIBRILLATION WITH RVR (HCC): ICD-10-CM

## 2023-02-03 RX ORDER — DILTIAZEM HYDROCHLORIDE 120 MG/1
120 CAPSULE, COATED, EXTENDED RELEASE ORAL DAILY
Qty: 90 CAPSULE | Refills: 3 | Status: SHIPPED | OUTPATIENT
Start: 2023-02-03

## 2023-02-03 NOTE — PROGRESS NOTES
Room #: 2    Feeling great since starting Diltiazem. Needs a refill. Chief Complaint   Patient presents with    Cardiomyopathy    Irregular Heart Beat    Other     PVCS         Visit Vitals  /74 (BP 1 Location: Left upper arm, BP Patient Position: Sitting, BP Cuff Size: Large adult)   Pulse 82   Resp 16   Ht 5' 11\" (1.803 m)   Wt 212 lb 3.2 oz (96.3 kg)   SpO2 98%   BMI 29.60 kg/m²         Chest pain:  NO  Shortness of breath:  NO  Edema: NO  Palpitations, skipped beats, rapid heartbeat:  NO  Dizziness:  NO    1. Have you been to the ER, urgent care clinic since your last visit? Hospitalized since your last visit? NO    2. Have you seen or consulted any other health care providers outside of the [address] [place] [address] since your last visit? Include any pap smears or colon screening.  NO      Refills:  NO

## 2023-02-16 DIAGNOSIS — I48.91 ATRIAL FIBRILLATION, UNSPECIFIED TYPE (HCC): ICD-10-CM

## 2023-02-16 NOTE — TELEPHONE ENCOUNTER
Requested Prescriptions     Signed Prescriptions Disp Refills    apixaban (Eliquis) 5 mg tablet 180 Tablet 3     Sig: Take 1 Tablet by mouth two (2) times a day. Authorizing Provider: SIGIFREDO LUJAN     Ordering User: JOHNNY FITCH     Refills per verbal order from Dr. True Last.    Last visit: 2/3/23  Next visit: 8/14/24

## 2023-04-07 ENCOUNTER — DOCUMENTATION ONLY (OUTPATIENT)
Dept: FAMILY MEDICINE CLINIC | Age: 68
End: 2023-04-07

## 2024-01-29 ENCOUNTER — TELEPHONE (OUTPATIENT)
Age: 69
End: 2024-01-29

## 2024-01-29 NOTE — TELEPHONE ENCOUNTER
Called patient, ID verified using two patient identifiers. Patient is having a [facility] phleb[facility] on Wednesday [facility] with Mel Tovar NP. They are requesting that patient hold his Eliquis for 48 hours prior to his procedure. Advised patient that I would relay request to CARLIE Frederick NP for recommendations and then call him back with her response. Patient verbalizes understanding and is agreeable to this plan.     Mary Woo @ [facility]  Ph # [phone]  Fax # [phone]

## 2024-01-29 NOTE — TELEPHONE ENCOUNTER
Fely Frederick APRN - KERRY  You11 minutes ago (4:01 PM)       Yes he can hold starting today for procedure Wednesday. Resume per proceduralist.    Thanks!     Called patient, ID verified using two patient identifiers. Informed patient that per CARLIE Frederick NP he can hold his Eliquis for 48 hours prior to his procedure and the resume as soon as deemed safe by Mel Tovar NP. Letter also faxed to Mary Woo @ [facility], confirmation received.

## 2024-01-29 NOTE — TELEPHONE ENCOUNTER
Pt. Is having a procedure on Wednesday, asking when he needs to stop taking his Eliquis, wants to know if he even needs to be taking it still.     Pt. Phone - [phone]

## 2024-02-23 DIAGNOSIS — I48.91 UNSPECIFIED ATRIAL FIBRILLATION (HCC): ICD-10-CM

## 2024-02-27 RX ORDER — APIXABAN 5 MG/1
5 TABLET, FILM COATED ORAL 2 TIMES DAILY
Qty: 180 TABLET | Refills: 1 | Status: SHIPPED | OUTPATIENT
Start: 2024-02-27

## 2024-02-27 NOTE — TELEPHONE ENCOUNTER
VO per MD    Future Appointments   Date Time Provider Department Center   8/14/2024  9:00 AM Clau Greene MD [facility] AMB

## 2025-03-05 ENCOUNTER — TELEPHONE (OUTPATIENT)
Age: 70
End: 2025-03-05

## 2025-03-05 NOTE — TELEPHONE ENCOUNTER
Patient needs to stop eliquis so he can get a epidural in his back due to a injury and he is in [place] currently and his best contact # is [phone]    YULIA Nunez MD  Interventional Pain Management  Phone: [phone]  Fax: [phone] [address] [place] [address] [address]

## 2025-03-06 NOTE — TELEPHONE ENCOUNTER
Fely Frederick APRN - NP  You; Kat Reyes APRN - CNP17 hours ago (3:13 PM)     Patient hasn't been seen in two years. We aren't filling or managing his Eliquis.       Returned patient call, ID verified using two patient identifiers.  Notified of above.    Mr. Lange states she did not miss any appointments.  His appointments were cancelled by our office.  He states he moves to [place] for 6 months out of the year every year.    He has had an injury and has herniated 2 disks and had to go to the ER.  He is unable to sit in a car to drive home for a visit with EP at this time.    He states the spinal doctor would like him to hold his eliquis for a few days prior to a spinal epidural.    He is unsure who is filling his eliquis at this time. He states it may be his PCP Ciro Sherman.  He actually wanted to see if he could stop taking the blood thinner as he doesn't think he needs it.    Advised Mr. Lange that I can reschedule his missed appointments.  I will have to discuss again with the NP/MD to see what we can do.    Future Appointments   Date Time Provider Department Center   5/2/2025  2:40 PM Fely Frederick APRN - NP [facility]

## 2025-03-06 NOTE — TELEPHONE ENCOUNTER
Fely Frederick APRN - NP  You16 minutes ago (9:36 AM)     I spoke to Jose Sherman' office. They were sent something about this in January and said cardiology is following. I told her we actually hadn't seen him in over two years. They last saw him in October and will take care of note tomorrow when back in the office. I gave them [place] doctor's info.     Called patient, ID verified using two patient identifiers.  Notified patient of above.    Patient appreciative of the return call and assistance and will call back with any other questions or concerns.    Future Appointments   Date Time Provider Department Center   5/2/2025  2:40 PM Fely Frederick APRN - NP [facility]

## 2025-04-03 NOTE — PROGRESS NOTES
[facility]  Cardiac Electrophysiology Clinic Care Note   []Initial visit     [x]Established visit     Patient Name: Trae Lange III - :1955 - MRN:[id number]  Primary Cardiologist: none  Electrophysiologist: Dr. Greene        HPI:    He is a 69 y.o. male who presents for follow up of AF.     He had persistent atrial fibrillation/atrial flutter and underwent AF and atrial flutter ablation in . His EF in 2018 was 20%. Cardiac catheterization at that time was negative for CAD. Post ablation his EF improved to 40-45%. Echocardiogram in 2020 demonstrated preserved LV function with an EF of 60%.      He wore a one week monitor 2022 which showed sinus rhythm. Avg HR 79, range . Rare PVCs, burden less than 0.1%. No AF. No abnormal bradyarrhythmias or tachyarrhythmias detected.     He is active and goes to the gym and plays softball. He denies chest pain, palpitations, dyspnea, dizziness or syncope. No bleeding issues on Eliquis.        Assessment and Plan       ICD-10-CM    1. Atrial fibrillation with RVR ([facility])  I48.91 EKG 12 Lead      2. S/P ablation of atrial fibrillation  Z98.890 EKG 12 Lead    Z86.79       3. Nonischemic cardiomyopathy ([facility])  I42.8 EKG 12 Lead          Paroxysmal atrial fibrillation/flutter.   - S/p ablation in  with Dr. Kim.   - No documented recurrence.   - No AF on 1 week monitor 2022.   - Follow up with Dr. Greene in one year      Hypertension  BP elevated at recent visit and he was started on lisinopril. He wishes to stop.   - I told him if he wanted to stop lisinopril he had to start monitoring his BP at home    Symptomatic PVCs.   - Improved on diltiazem. Continue current dose.   - Less than 0.1% burden on 1 week monitor 2022.      Nonischemic cardiomyopathy.   - Likely tachy-induced.   - EF 60% by echo 2020.      Anticoagulation.   - Continue Eliquis 5 mg BID for CVA prophylaxis.   - Denies of any bleeding issues. Know to

## 2025-05-02 ENCOUNTER — OFFICE VISIT (OUTPATIENT)
Age: 70
End: 2025-05-02
Payer: MEDICARE

## 2025-05-02 VITALS
OXYGEN SATURATION: 98 % | HEIGHT: 71 IN | SYSTOLIC BLOOD PRESSURE: 120 MMHG | RESPIRATION RATE: 16 BRPM | DIASTOLIC BLOOD PRESSURE: 74 MMHG | BODY MASS INDEX: 28.84 KG/M2 | WEIGHT: 206 LBS | HEART RATE: 77 BPM

## 2025-05-02 DIAGNOSIS — Z98.890 S/P ABLATION OF ATRIAL FIBRILLATION: ICD-10-CM

## 2025-05-02 DIAGNOSIS — I42.8 NONISCHEMIC CARDIOMYOPATHY (HCC): ICD-10-CM

## 2025-05-02 DIAGNOSIS — I48.91 ATRIAL FIBRILLATION WITH RVR (HCC): Primary | ICD-10-CM

## 2025-05-02 DIAGNOSIS — Z86.79 S/P ABLATION OF ATRIAL FIBRILLATION: ICD-10-CM

## 2025-05-02 PROCEDURE — 99214 OFFICE O/P EST MOD 30 MIN: CPT | Performed by: NURSE PRACTITIONER

## 2025-05-02 PROCEDURE — 1123F ACP DISCUSS/DSCN MKR DOCD: CPT | Performed by: NURSE PRACTITIONER

## 2025-05-02 PROCEDURE — 1159F MED LIST DOCD IN RCRD: CPT | Performed by: NURSE PRACTITIONER

## 2025-05-02 PROCEDURE — 93010 ELECTROCARDIOGRAM REPORT: CPT | Performed by: NURSE PRACTITIONER

## 2025-05-02 PROCEDURE — 93005 ELECTROCARDIOGRAM TRACING: CPT | Performed by: NURSE PRACTITIONER

## 2025-05-02 PROCEDURE — 1126F AMNT PAIN NOTED NONE PRSNT: CPT | Performed by: NURSE PRACTITIONER

## 2025-05-02 RX ORDER — LISINOPRIL 5 MG/1
5 TABLET ORAL DAILY
COMMUNITY

## 2025-05-02 ASSESSMENT — PATIENT HEALTH QUESTIONNAIRE - PHQ9
DEPRESSION UNABLE TO ASSESS: PT REFUSES
DEPRESSION UNABLE TO ASSESS: PT REFUSES

## 2025-05-02 NOTE — PROGRESS NOTES
Chief Complaint   Patient presents with    Palpitations    Atrial Fibrillation     Have you been to the ER, urgent care clinic since your last visit?  Hospitalized since your last visit?   Yes Had Herniated disk had epidural injection     Denies Chest Pain, Dizziness,Shortness of Breath.

## 2025-07-02 DIAGNOSIS — I48.91 UNSPECIFIED ATRIAL FIBRILLATION (HCC): ICD-10-CM

## 2025-07-02 RX ORDER — APIXABAN 5 MG/1
5 TABLET, FILM COATED ORAL 2 TIMES DAILY
Qty: 180 TABLET | Refills: 1 | Status: SHIPPED | OUTPATIENT
Start: 2025-07-02

## 2025-07-02 NOTE — TELEPHONE ENCOUNTER
Ordered Per  Verbal Order.     Last appt: Visit date not found   Future Appointments   Date Time Provider Department Center   5/29/2026  9:20 AM Clau Greene MD [facility]       Requested Prescriptions     Pending Prescriptions Disp Refills    ELIQUIS 5 MG TABS tablet [Pharmacy Med Name: Eliquis 5 MG Oral Tablet] 180 tablet 0     Sig: Take 1 tablet by mouth twice daily         Prior labs and Blood pressures:  BP Readings from Last 3 Encounters:   05/02/25 120/74   02/03/23 128/74   11/18/22 130/74     Lab Results   Component Value Date/Time     11/18/2022 10:00 AM    K 4.4 11/18/2022 10:00 AM     11/18/2022 10:00 AM    CO2 30 11/18/2022 10:00 AM    BUN 15 11/18/2022 10:00 AM    GFRAA >60 03/17/2021 09:12 AM        Lab Results   Component Value Date/Time    TSH 1.89 11/18/2022 10:00 AM

## 2025-07-03 ENCOUNTER — TELEPHONE (OUTPATIENT)
Age: 70
End: 2025-07-03

## 2025-07-03 DIAGNOSIS — I48.91 UNSPECIFIED ATRIAL FIBRILLATION (HCC): Primary | ICD-10-CM

## 2025-07-03 NOTE — TELEPHONE ENCOUNTER
ATR/UTR/LMOVM to return call to advise we need labs before we can order other meds.     I have ordered lab just need him to go get it done.

## 2025-07-03 NOTE — TELEPHONE ENCOUNTER
Patient is calling because he would like to speak with the doctor about a different blood thinner (Eliquis) that he can take that is not so expensive.Patient said the cost of the medicine is $570.00 for 3 months.    Pharmacy:  [facility] [address] [place] [id number] [facility] [phone] - F [phone]     [phone] patient

## 2025-07-07 ENCOUNTER — TELEPHONE (OUTPATIENT)
Age: 70
End: 2025-07-07

## 2025-07-07 DIAGNOSIS — I48.91 UNSPECIFIED ATRIAL FIBRILLATION (HCC): ICD-10-CM

## 2025-07-07 LAB
ANION GAP SERPL CALC-SCNC: 6 MMOL/L (ref 2–12)
BUN SERPL-MCNC: 14 MG/DL (ref 6–20)
BUN/CREAT SERPL: 14 (ref 12–20)
CALCIUM SERPL-MCNC: 8.8 MG/DL (ref 8.5–10.1)
CHLORIDE SERPL-SCNC: 110 MMOL/L (ref 97–108)
CO2 SERPL-SCNC: 26 MMOL/L (ref 21–32)
CREAT SERPL-MCNC: 0.97 MG/DL (ref 0.7–1.3)
GLUCOSE SERPL-MCNC: 103 MG/DL (ref 65–100)
POTASSIUM SERPL-SCNC: 4.2 MMOL/L (ref 3.5–5.1)
SODIUM SERPL-SCNC: 142 MMOL/L (ref 136–145)

## 2025-07-09 NOTE — TELEPHONE ENCOUNTER
Fely Frederick, AURE - NP  1 hour ago (11:53 AM)     He could take Xarelto 20 mg daily or Pradaxa 150 mg BID     Called patient, ID verified using two patient identifiers.  Notified patient of his normal lab results and medication options above.    Mr. Lange states he would like to try the xarelto and see how much that will cost him.    Prescription sent to confirmed pharmacy.    Patient verbalized understanding and will call back with any other questions or concerns.    Future Appointments   Date Time Provider Department Center   5/29/2026  9:20 AM Clau Greene MD [facility]

## 2025-08-07 ENCOUNTER — APPOINTMENT (OUTPATIENT)
Age: 70
Setting detail: DERMATOLOGY
End: 2025-08-07

## 2025-08-07 DIAGNOSIS — D22 MELANOCYTIC NEVI: ICD-10-CM

## 2025-08-07 DIAGNOSIS — L82.0 INFLAMED SEBORRHEIC KERATOSIS: ICD-10-CM

## 2025-08-07 DIAGNOSIS — Z71.89 OTHER SPECIFIED COUNSELING: ICD-10-CM

## 2025-08-07 DIAGNOSIS — L81.4 OTHER MELANIN HYPERPIGMENTATION: ICD-10-CM

## 2025-08-07 DIAGNOSIS — L57.0 ACTINIC KERATOSIS: ICD-10-CM

## 2025-08-07 DIAGNOSIS — D18.0 HEMANGIOMA: ICD-10-CM

## 2025-08-07 DIAGNOSIS — I83.9 ASYMPTOMATIC VARICOSE VEINS OF LOWER EXTREMITIES: ICD-10-CM

## 2025-08-07 DIAGNOSIS — L82.1 OTHER SEBORRHEIC KERATOSIS: ICD-10-CM

## 2025-08-07 PROBLEM — D22.71 MELANOCYTIC NEVI OF RIGHT LOWER LIMB, INCLUDING HIP: Status: ACTIVE | Noted: 2025-08-07

## 2025-08-07 PROBLEM — D18.01 HEMANGIOMA OF SKIN AND SUBCUTANEOUS TISSUE: Status: ACTIVE | Noted: 2025-08-07

## 2025-08-07 PROBLEM — D22.72 MELANOCYTIC NEVI OF LEFT LOWER LIMB, INCLUDING HIP: Status: ACTIVE | Noted: 2025-08-07

## 2025-08-07 PROBLEM — I83.92 ASYMPTOMATIC VARICOSE VEINS OF LEFT LOWER EXTREMITY: Status: ACTIVE | Noted: 2025-08-07

## 2025-08-07 PROBLEM — D22.5 MELANOCYTIC NEVI OF TRUNK: Status: ACTIVE | Noted: 2025-08-07

## 2025-08-07 PROCEDURE — ? COUNSELING

## 2025-08-07 PROCEDURE — ? SUNSCREEN RECOMMENDATIONS

## 2025-08-07 PROCEDURE — ? LIQUID NITROGEN

## 2025-08-07 ASSESSMENT — LOCATION SIMPLE DESCRIPTION DERM
LOCATION SIMPLE: RIGHT POPLITEAL SKIN
LOCATION SIMPLE: LEFT THIGH
LOCATION SIMPLE: LEFT UPPER BACK
LOCATION SIMPLE: RIGHT POSTERIOR THIGH
LOCATION SIMPLE: LEFT PRETIBIAL REGION
LOCATION SIMPLE: RIGHT TEMPLE
LOCATION SIMPLE: LEFT POSTERIOR AXILLA
LOCATION SIMPLE: LEFT NOSE
LOCATION SIMPLE: RIGHT PRETIBIAL REGION
LOCATION SIMPLE: ABDOMEN
LOCATION SIMPLE: POSTERIOR SCALP
LOCATION SIMPLE: RIGHT FOREHEAD
LOCATION SIMPLE: LEFT LOWER BACK
LOCATION SIMPLE: LEFT POSTERIOR THIGH
LOCATION SIMPLE: RIGHT THIGH
LOCATION SIMPLE: CHEST
LOCATION SIMPLE: LOWER BACK
LOCATION SIMPLE: LEFT FOREHEAD
LOCATION SIMPLE: LEFT POPLITEAL SKIN

## 2025-08-07 ASSESSMENT — LOCATION DETAILED DESCRIPTION DERM
LOCATION DETAILED: LEFT POSTERIOR AXILLA
LOCATION DETAILED: LEFT PROXIMAL PRETIBIAL REGION
LOCATION DETAILED: LEFT MID-UPPER BACK
LOCATION DETAILED: LEFT SUPERIOR MEDIAL LOWER BACK
LOCATION DETAILED: RIGHT DISTAL POSTERIOR THIGH
LOCATION DETAILED: SUPERIOR LUMBAR SPINE
LOCATION DETAILED: RIGHT RIB CAGE
LOCATION DETAILED: LEFT MEDIAL INFERIOR CHEST
LOCATION DETAILED: RIGHT PROXIMAL PRETIBIAL REGION
LOCATION DETAILED: PERIUMBILICAL SKIN
LOCATION DETAILED: RIGHT MEDIAL INFERIOR CHEST
LOCATION DETAILED: LEFT RIB CAGE
LOCATION DETAILED: LEFT MEDIAL UPPER BACK
LOCATION DETAILED: RIGHT MEDIAL SUPERIOR CHEST
LOCATION DETAILED: RIGHT CENTRAL TEMPLE
LOCATION DETAILED: LEFT NASAL SIDEWALL
LOCATION DETAILED: LEFT LATERAL ABDOMEN
LOCATION DETAILED: LEFT POPLITEAL SKIN
LOCATION DETAILED: RIGHT ANTERIOR DISTAL THIGH
LOCATION DETAILED: POSTERIOR MID-PARIETAL SCALP
LOCATION DETAILED: RIGHT POPLITEAL SKIN
LOCATION DETAILED: LEFT DISTAL POSTERIOR THIGH
LOCATION DETAILED: RIGHT SUPERIOR MEDIAL FOREHEAD
LOCATION DETAILED: LEFT INFERIOR LATERAL LOWER BACK
LOCATION DETAILED: LEFT INFERIOR FOREHEAD
LOCATION DETAILED: LEFT LATERAL PROXIMAL PRETIBIAL REGION
LOCATION DETAILED: RIGHT FOREHEAD
LOCATION DETAILED: LEFT ANTERIOR DISTAL THIGH

## 2025-08-07 ASSESSMENT — LOCATION ZONE DERM
LOCATION ZONE: AXILLAE
LOCATION ZONE: LEG
LOCATION ZONE: TRUNK
LOCATION ZONE: NOSE
LOCATION ZONE: FACE
LOCATION ZONE: SCALP

## 2025-08-19 ENCOUNTER — TRANSCRIBE ORDERS (OUTPATIENT)
Facility: HOSPITAL | Age: 70
End: 2025-08-19

## 2025-08-19 DIAGNOSIS — M19.011 OSTEOARTHRITIS OF RIGHT GLENOHUMERAL JOINT: Primary | ICD-10-CM

## 2025-08-19 DIAGNOSIS — M94.9 CHONDRAL LESION: ICD-10-CM
